# Patient Record
Sex: FEMALE | Race: WHITE | Employment: FULL TIME
[De-identification: names, ages, dates, MRNs, and addresses within clinical notes are randomized per-mention and may not be internally consistent; named-entity substitution may affect disease eponyms.]

---

## 2017-01-03 ENCOUNTER — TELEPHONE (OUTPATIENT)
Dept: FAMILY MEDICINE CLINIC | Facility: CLINIC | Age: 37
End: 2017-01-03

## 2017-01-03 DIAGNOSIS — G47.00 INSOMNIA, UNSPECIFIED TYPE: ICD-10-CM

## 2017-01-05 DIAGNOSIS — G47.00 INSOMNIA, UNSPECIFIED TYPE: ICD-10-CM

## 2017-01-09 DIAGNOSIS — G56.01 CARPAL TUNNEL SYNDROME OF RIGHT WRIST: ICD-10-CM

## 2017-01-09 RX ORDER — IBUPROFEN 600 MG/1
600 TABLET ORAL EVERY 8 HOURS PRN
Qty: 30 TABLET | Refills: 0 | Status: SHIPPED | OUTPATIENT
Start: 2017-01-09 | End: 2017-01-12 | Stop reason: SDUPTHER

## 2017-01-12 ENCOUNTER — OFFICE VISIT (OUTPATIENT)
Dept: FAMILY MEDICINE CLINIC | Facility: CLINIC | Age: 37
End: 2017-01-12

## 2017-01-12 VITALS
DIASTOLIC BLOOD PRESSURE: 70 MMHG | BODY MASS INDEX: 33.12 KG/M2 | HEIGHT: 64 IN | SYSTOLIC BLOOD PRESSURE: 126 MMHG | HEART RATE: 74 BPM | WEIGHT: 194 LBS | OXYGEN SATURATION: 98 % | TEMPERATURE: 96.6 F

## 2017-01-12 DIAGNOSIS — F98.8 ADD (ATTENTION DEFICIT DISORDER): ICD-10-CM

## 2017-01-12 DIAGNOSIS — Z13.220 SCREENING, LIPID: ICD-10-CM

## 2017-01-12 DIAGNOSIS — N39.41 URGE INCONTINENCE: ICD-10-CM

## 2017-01-12 DIAGNOSIS — M54.50 CHRONIC MIDLINE LOW BACK PAIN WITHOUT SCIATICA: Primary | ICD-10-CM

## 2017-01-12 DIAGNOSIS — G56.01 CARPAL TUNNEL SYNDROME OF RIGHT WRIST: ICD-10-CM

## 2017-01-12 DIAGNOSIS — G89.29 CHRONIC MIDLINE LOW BACK PAIN WITHOUT SCIATICA: Primary | ICD-10-CM

## 2017-01-12 PROCEDURE — 1036F TOBACCO NON-USER: CPT | Performed by: PHYSICIAN ASSISTANT

## 2017-01-12 PROCEDURE — 99214 OFFICE O/P EST MOD 30 MIN: CPT | Performed by: PHYSICIAN ASSISTANT

## 2017-01-12 PROCEDURE — G8484 FLU IMMUNIZE NO ADMIN: HCPCS | Performed by: PHYSICIAN ASSISTANT

## 2017-01-12 PROCEDURE — G8427 DOCREV CUR MEDS BY ELIG CLIN: HCPCS | Performed by: PHYSICIAN ASSISTANT

## 2017-01-12 PROCEDURE — G8419 CALC BMI OUT NRM PARAM NOF/U: HCPCS | Performed by: PHYSICIAN ASSISTANT

## 2017-01-12 RX ORDER — IBUPROFEN 600 MG/1
600 TABLET ORAL EVERY 8 HOURS PRN
Qty: 90 TABLET | Refills: 0 | Status: SHIPPED | OUTPATIENT
Start: 2017-01-12 | End: 2017-04-29 | Stop reason: SDUPTHER

## 2017-01-12 RX ORDER — TROSPIUM CHLORIDE 20 MG/1
20 TABLET, FILM COATED ORAL 2 TIMES DAILY
Qty: 180 TABLET | Refills: 3 | Status: SHIPPED | OUTPATIENT
Start: 2017-01-12 | End: 2018-01-06 | Stop reason: SDUPTHER

## 2017-01-12 RX ORDER — LISDEXAMFETAMINE DIMESYLATE 50 MG
50 CAPSULE ORAL DAILY
Qty: 30 CAPSULE | Refills: 0 | Status: SHIPPED | OUTPATIENT
Start: 2017-01-12 | End: 2017-07-12

## 2017-01-12 ASSESSMENT — ENCOUNTER SYMPTOMS
COLOR CHANGE: 0
ABDOMINAL PAIN: 0
CONSTIPATION: 0
VOMITING: 0
NAUSEA: 0
COUGH: 0
WHEEZING: 0
DIARRHEA: 0
BOWEL INCONTINENCE: 0
SHORTNESS OF BREATH: 0
BACK PAIN: 1

## 2017-01-18 ENCOUNTER — TELEPHONE (OUTPATIENT)
Dept: FAMILY MEDICINE CLINIC | Facility: CLINIC | Age: 37
End: 2017-01-18

## 2017-01-18 ENCOUNTER — PATIENT MESSAGE (OUTPATIENT)
Dept: FAMILY MEDICINE CLINIC | Facility: CLINIC | Age: 37
End: 2017-01-18

## 2017-02-14 DIAGNOSIS — G47.00 INSOMNIA, UNSPECIFIED TYPE: ICD-10-CM

## 2017-03-18 ENCOUNTER — PATIENT MESSAGE (OUTPATIENT)
Dept: FAMILY MEDICINE CLINIC | Age: 37
End: 2017-03-18

## 2017-03-26 ENCOUNTER — PATIENT MESSAGE (OUTPATIENT)
Dept: FAMILY MEDICINE CLINIC | Age: 37
End: 2017-03-26

## 2017-03-27 RX ORDER — ZOLPIDEM TARTRATE 6.25 MG/1
6.25 TABLET, FILM COATED, EXTENDED RELEASE ORAL NIGHTLY PRN
Qty: 30 TABLET | Refills: 2 | Status: SHIPPED | OUTPATIENT
Start: 2017-03-27 | End: 2017-04-10

## 2017-04-17 ENCOUNTER — PATIENT MESSAGE (OUTPATIENT)
Dept: FAMILY MEDICINE CLINIC | Age: 37
End: 2017-04-17

## 2017-04-17 RX ORDER — ZOLPIDEM TARTRATE 12.5 MG/1
12.5 TABLET, FILM COATED, EXTENDED RELEASE ORAL NIGHTLY PRN
Qty: 30 TABLET | Refills: 0 | OUTPATIENT
Start: 2017-04-17 | End: 2017-11-13 | Stop reason: SDUPTHER

## 2017-04-29 DIAGNOSIS — G56.01 CARPAL TUNNEL SYNDROME OF RIGHT WRIST: ICD-10-CM

## 2017-05-01 RX ORDER — IBUPROFEN 600 MG/1
TABLET ORAL
Qty: 90 TABLET | Refills: 0 | Status: SHIPPED | OUTPATIENT
Start: 2017-05-01 | End: 2017-08-23 | Stop reason: SDUPTHER

## 2017-07-12 ENCOUNTER — OFFICE VISIT (OUTPATIENT)
Dept: FAMILY MEDICINE CLINIC | Age: 37
End: 2017-07-12
Payer: COMMERCIAL

## 2017-07-12 VITALS
SYSTOLIC BLOOD PRESSURE: 122 MMHG | HEART RATE: 78 BPM | HEIGHT: 65 IN | DIASTOLIC BLOOD PRESSURE: 70 MMHG | BODY MASS INDEX: 30.82 KG/M2 | OXYGEN SATURATION: 98 % | WEIGHT: 185 LBS

## 2017-07-12 DIAGNOSIS — F98.8 ADD (ATTENTION DEFICIT DISORDER): Primary | ICD-10-CM

## 2017-07-12 DIAGNOSIS — N32.81 OAB (OVERACTIVE BLADDER): ICD-10-CM

## 2017-07-12 PROCEDURE — 1036F TOBACCO NON-USER: CPT | Performed by: PHYSICIAN ASSISTANT

## 2017-07-12 PROCEDURE — 99213 OFFICE O/P EST LOW 20 MIN: CPT | Performed by: PHYSICIAN ASSISTANT

## 2017-07-12 PROCEDURE — G8427 DOCREV CUR MEDS BY ELIG CLIN: HCPCS | Performed by: PHYSICIAN ASSISTANT

## 2017-07-12 PROCEDURE — G8419 CALC BMI OUT NRM PARAM NOF/U: HCPCS | Performed by: PHYSICIAN ASSISTANT

## 2017-07-12 ASSESSMENT — ENCOUNTER SYMPTOMS
WHEEZING: 0
VOMITING: 0
NAUSEA: 0
ABDOMINAL PAIN: 0
SHORTNESS OF BREATH: 0
COLOR CHANGE: 0
COUGH: 0
CONSTIPATION: 0
DIARRHEA: 0

## 2017-08-23 DIAGNOSIS — G56.01 CARPAL TUNNEL SYNDROME OF RIGHT WRIST: ICD-10-CM

## 2017-08-24 DIAGNOSIS — F98.8 ADD (ATTENTION DEFICIT DISORDER): ICD-10-CM

## 2017-08-24 RX ORDER — IBUPROFEN 600 MG/1
TABLET ORAL
Qty: 90 TABLET | Refills: 0 | Status: SHIPPED | OUTPATIENT
Start: 2017-08-24 | End: 2017-10-01 | Stop reason: SDUPTHER

## 2017-10-01 DIAGNOSIS — G56.01 CARPAL TUNNEL SYNDROME OF RIGHT WRIST: ICD-10-CM

## 2017-10-02 DIAGNOSIS — F98.8 ADD (ATTENTION DEFICIT DISORDER): ICD-10-CM

## 2017-10-02 RX ORDER — IBUPROFEN 600 MG/1
TABLET ORAL
Qty: 90 TABLET | Refills: 0 | Status: SHIPPED | OUTPATIENT
Start: 2017-10-02 | End: 2017-10-21 | Stop reason: SDUPTHER

## 2017-10-11 ENCOUNTER — OFFICE VISIT (OUTPATIENT)
Dept: FAMILY MEDICINE CLINIC | Age: 37
End: 2017-10-11
Payer: COMMERCIAL

## 2017-10-11 ENCOUNTER — HOSPITAL ENCOUNTER (OUTPATIENT)
Age: 37
Setting detail: SPECIMEN
Discharge: HOME | End: 2017-10-11
Payer: COMMERCIAL

## 2017-10-11 VITALS
RESPIRATION RATE: 18 BRPM | SYSTOLIC BLOOD PRESSURE: 128 MMHG | DIASTOLIC BLOOD PRESSURE: 84 MMHG | BODY MASS INDEX: 29.79 KG/M2 | WEIGHT: 189.8 LBS | HEART RATE: 80 BPM | HEIGHT: 67 IN | TEMPERATURE: 97.9 F

## 2017-10-11 DIAGNOSIS — Z12.4 PAP SMEAR FOR CERVICAL CANCER SCREENING: Primary | ICD-10-CM

## 2017-10-11 DIAGNOSIS — F98.8 ADD (ATTENTION DEFICIT DISORDER): ICD-10-CM

## 2017-10-11 PROCEDURE — 99395 PREV VISIT EST AGE 18-39: CPT | Performed by: PHYSICIAN ASSISTANT

## 2017-10-11 NOTE — PROGRESS NOTES
Visit Information    Have you changed or started any medications since your last visit including any over-the-counter medicines, vitamins, or herbal medicines? yes - SUPPLEMENTS   Have you stopped taking any of your medications? Is so, why? -  no  Are you having any side effects from any of your medications? - no    Have you seen any other physician or provider since your last visit?  no   Have you had any other diagnostic tests since your last visit?  no   Have you been seen in the emergency room and/or had an admission in a hospital since we last saw you?  no   Have you had your routine dental cleaning in the past 6 months?  no     Do you have an active [employer] account? If no, what is the barrier?   No: email sent    Patient Care Team:  Tamica Muñoz PA-C as PCP - General (Family Medicine)    Medical History Review  Past Medical, Family, and Social History reviewed and does contribute to the patient presenting condition    Health Maintenance   Topic Date Due    DTaP/Tdap/Td vaccine (1 - Tdap) 03/22/2000    Flu vaccine (1) 09/01/2017    Cervical cancer screen  10/28/2017    HIV screen  Completed

## 2017-10-11 NOTE — PROGRESS NOTES
[address]  [address] [place], [place]  [phone]      Regina Rivero is a 40 y.o. female who presents today for her  medical conditions/complaints as noted below. Regina Rivero is c/o of Gynecologic Exam (Last pap 10/28/14 )  . HPI:     HPI    Patient here for annual pap  Patient states menses normally regular. Was late by 10 days this last cycle. Mom was in menopause around early 45s. Also reports doing well on her vyvanse. No refill needed at this time  She reports just recently having some back pain again. Had surgery 1 year ago. No new trauma.  Hx spondylolithesis, surgery with Dr. Tarah Quintanilla in the past    Past Medical History:   Diagnosis Date    ADD (attention deficit disorder)     Anxiety     Back pain     Carpal tunnel syndrome of right wrist 5/19/2016    Foot pain     left    GERD (gastroesophageal reflux disease)     Lumbago     OAB (overactive bladder) 7/12/2017    Sciatica     Urge incontinence       Past Surgical History:   Procedure Laterality Date    BACK SURGERY  09/19/2016    Lumbar interbody fusion posterior, L5-S1    DILATION AND CURETTAGE OF UTERUS      TONSILLECTOMY      WISDOM TOOTH EXTRACTION       Family History   Problem Relation Age of Onset    High Cholesterol Mother      Social History   Substance Use Topics    Smoking status: Former Smoker     Packs/day: 1.00     Years: 15.00     Quit date: 7/13/2016    Smokeless tobacco: Never Used    Alcohol use Yes     1 Glasses of wine per week      Comment: Maybe a couple drinks a month      Current Outpatient Prescriptions   Medication Sig Dispense Refill    Sennosides-Docusate Sodium (STOOL SOFTENER LAXATIVE PO) Take 1 tablet by mouth 2 times daily      Nutritional Supplements (DHEA PO) Take 1 tablet by mouth daily      lisdexamfetamine (VYVANSE) 50 MG capsule Take 1 capsule by mouth every morning  Fill 2/9/17. 30 capsule 0    ibuprofen (ADVIL;MOTRIN) 600 MG tablet TAKE 1 TABLET EVERY 8 HOURSAS NEEDED FOR PAIN 90 tablet 0    trospium (SANCTURA) 20 MG tablet Take 1 tablet by mouth 2 times daily 180 tablet 3    Lactobacillus (PROBIOTIC ACIDOPHILUS PO) Take 1 tablet by mouth daily       No current facility-administered medications for this visit. No Known Allergies    Health Maintenance   Topic Date Due    DTaP/Tdap/Td vaccine (1 - Tdap) 03/22/2000    Cervical cancer screen  10/28/2017    Flu vaccine (1) 10/01/2018 (Originally 9/1/2017)    HIV screen  Completed       Subjective:      normal menses, no abnormal bleeding, pelvic pain or discharge, no breast pain or new or enlarging lumps on self exam, no vaginal bleeding, no discharge or pelvic pain. No new partners. No present concerns. Objective:   /84 (Site: Left Arm, Position: Sitting, Cuff Size: Large Adult)   Pulse 80   Temp 97.9 °F (36.6 °C) (Oral)   Resp 18   Ht 5' 7\" (1.702 m)   Wt 189 lb 12.8 oz (86.1 kg)   LMP 09/24/2017   BMI 29.73 kg/m²     General Appearance: alert and oriented to person, place and time, well developed and well- nourished, in no acute distress  Skin: warm and dry, no rash or erythema  Head: normocephalic and atraumatic  Neck: supple and non-tender without mass, no thyromegaly or thyroid nodules, no cervical lymphadenopathy  Pulmonary/Chest: clear to auscultation bilaterally- no wheezes, rales or rhonchi, normal air movement, no respiratory distress  Cardiovascular: normal rate, regular rhythm, normal S1 and S2, no murmurs, rubs, clicks, or gallops  Abdomen: soft, non-tender, non-distended, normal bowel sounds, no masses or organomegaly  Pelvic: normal external genitalia, vulva, vagina, cervix, uterus and adnexa. No CMT. No noted discharge or lesions. No masses noted. Breast: appear normal, no suspicious masses, no skin or nipple changes or axillary nodes bilaterally. Psych: pleasant, cooperative          Assessment:    annual pap exam  1.  Pap smear for cervical cancer screening  PAP Smear   2. ADD (attention deficit disorder)         Plan:      Return in about 3 months (around 1/11/2018) for add. Office will call pt with pap results in the next 10 days. If no call received pt agreed to call in the next 2 weeks for results. Patient going to call Dr. Indira Kaplan for apt for her back  ADD stable on vyvanse  Patient agreed with treatment plan  Health Maintenance reviewed - declined flu shot. Orders Placed This Encounter   Procedures    PAP Smear     Patient History:    Patient's last menstrual period was 09/24/2017. OBGYN Status: Having periods  Past Surgical History:  09/19/2016: BACK SURGERY      Comment: Lumbar interbody fusion posterior, L5-S1  No date: DILATION AND CURETTAGE OF UTERUS  No date: TONSILLECTOMY  No date: WISDOM TOOTH EXTRACTION      Smoking status: Former Smoker                                                              Packs/day: 1.00      Years: 15.00        Quit date: 7/13/2016  Smokeless tobacco: Never Used                           Order Specific Question:   Collection Type     Answer: Thin Prep     Order Specific Question:   Prior Abnormal Pap Test     Answer:   No     Order Specific Question:   Screening or Diagnostic     Answer:   Screening     Order Specific Question:   HPV Requested? Answer:   Yes     Order Specific Question:   High Risk Patient     Answer:   Lack of Screening       Controlled Substances Monitoring:     Attestation: The Prescription Monitoring Report for this patient was reviewed today. (Renee Valdez PA-C)  Documentation: No signs of potential drug abuse or diversion identified. (Renee Nunez PA-C)    Patient given educational materials - see patient instructions. Discussed use, benefit, and side effects of prescribed medications. All patient questions answered. Pt voiced understanding. Reviewed health maintenance. Instructed to continue current medications, diet and exercise.   Patient agreed with treatment

## 2017-10-13 LAB
HPV SAMPLE: NORMAL
HPV SOURCE: NORMAL
HPV, GENOTYPE 16: NOT DETECTED
HPV, GENOTYPE 18: NOT DETECTED
HPV, HIGH RISK OTHER: NOT DETECTED
HPV, INTERPRETATION: NORMAL

## 2017-10-16 LAB — CYTOLOGY REPORT: NORMAL

## 2017-10-21 DIAGNOSIS — G56.01 CARPAL TUNNEL SYNDROME OF RIGHT WRIST: ICD-10-CM

## 2017-10-23 RX ORDER — IBUPROFEN 600 MG/1
TABLET ORAL
Qty: 90 TABLET | Refills: 0 | Status: SHIPPED | OUTPATIENT
Start: 2017-10-23 | End: 2017-11-13 | Stop reason: SDUPTHER

## 2017-11-13 DIAGNOSIS — F98.8 ATTENTION DEFICIT DISORDER, UNSPECIFIED HYPERACTIVITY PRESENCE: ICD-10-CM

## 2017-11-13 DIAGNOSIS — G56.01 CARPAL TUNNEL SYNDROME OF RIGHT WRIST: ICD-10-CM

## 2017-11-13 RX ORDER — IBUPROFEN 600 MG/1
TABLET ORAL
Qty: 90 TABLET | Refills: 0 | Status: SHIPPED | OUTPATIENT
Start: 2017-11-13 | End: 2018-07-02 | Stop reason: ALTCHOICE

## 2017-11-14 RX ORDER — ZOLPIDEM TARTRATE 12.5 MG/1
12.5 TABLET, FILM COATED, EXTENDED RELEASE ORAL NIGHTLY PRN
Qty: 30 TABLET | Refills: 0 | Status: SHIPPED | OUTPATIENT
Start: 2017-11-14 | End: 2017-12-14

## 2017-12-15 ENCOUNTER — HOSPITAL ENCOUNTER (OUTPATIENT)
Dept: MRI IMAGING | Age: 37
Discharge: HOME | End: 2017-12-15
Payer: COMMERCIAL

## 2017-12-15 DIAGNOSIS — M51.26 LUMBAR HERNIATED DISC: ICD-10-CM

## 2017-12-15 PROCEDURE — 6360000004 HC RX CONTRAST MEDICATION: Performed by: NEUROLOGICAL SURGERY

## 2017-12-15 PROCEDURE — 72158 MRI LUMBAR SPINE W/O & W/DYE: CPT

## 2017-12-15 PROCEDURE — A9579 GAD-BASE MR CONTRAST NOS,1ML: HCPCS | Performed by: NEUROLOGICAL SURGERY

## 2017-12-15 RX ADMIN — GADOTERIDOL 18 ML: 279.3 INJECTION, SOLUTION INTRAVENOUS at 16:51

## 2017-12-21 DIAGNOSIS — F98.8 ATTENTION DEFICIT DISORDER, UNSPECIFIED HYPERACTIVITY PRESENCE: ICD-10-CM

## 2018-01-06 DIAGNOSIS — N39.41 URGE INCONTINENCE: ICD-10-CM

## 2018-01-08 ENCOUNTER — OFFICE VISIT (OUTPATIENT)
Dept: FAMILY MEDICINE CLINIC | Age: 38
End: 2018-01-08
Payer: COMMERCIAL

## 2018-01-08 VITALS
DIASTOLIC BLOOD PRESSURE: 72 MMHG | OXYGEN SATURATION: 98 % | HEART RATE: 73 BPM | WEIGHT: 191 LBS | BODY MASS INDEX: 29.98 KG/M2 | TEMPERATURE: 96.8 F | SYSTOLIC BLOOD PRESSURE: 110 MMHG | HEIGHT: 67 IN

## 2018-01-08 DIAGNOSIS — F98.8 ATTENTION DEFICIT DISORDER, UNSPECIFIED HYPERACTIVITY PRESENCE: Primary | ICD-10-CM

## 2018-01-08 DIAGNOSIS — G47.20 SLEEP-WAKE CYCLE DISORDER: ICD-10-CM

## 2018-01-08 PROCEDURE — G8484 FLU IMMUNIZE NO ADMIN: HCPCS | Performed by: PHYSICIAN ASSISTANT

## 2018-01-08 PROCEDURE — G8427 DOCREV CUR MEDS BY ELIG CLIN: HCPCS | Performed by: PHYSICIAN ASSISTANT

## 2018-01-08 PROCEDURE — G8417 CALC BMI ABV UP PARAM F/U: HCPCS | Performed by: PHYSICIAN ASSISTANT

## 2018-01-08 PROCEDURE — 1036F TOBACCO NON-USER: CPT | Performed by: PHYSICIAN ASSISTANT

## 2018-01-08 PROCEDURE — 99213 OFFICE O/P EST LOW 20 MIN: CPT | Performed by: PHYSICIAN ASSISTANT

## 2018-01-08 RX ORDER — ZOLPIDEM TARTRATE 12.5 MG/1
TABLET, FILM COATED, EXTENDED RELEASE ORAL
Refills: 0 | COMMUNITY
Start: 2017-12-29 | End: 2019-07-11 | Stop reason: SDUPTHER

## 2018-01-08 RX ORDER — METHYLPREDNISOLONE 4 MG/1
TABLET ORAL
Refills: 0 | COMMUNITY
Start: 2018-01-02 | End: 2018-01-26 | Stop reason: ALTCHOICE

## 2018-01-08 RX ORDER — TROSPIUM CHLORIDE 20 MG/1
TABLET, FILM COATED ORAL
Qty: 180 TABLET | Refills: 3 | Status: SHIPPED | OUTPATIENT
Start: 2018-01-08 | End: 2018-12-23 | Stop reason: SDUPTHER

## 2018-01-08 ASSESSMENT — PATIENT HEALTH QUESTIONNAIRE - PHQ9
SUM OF ALL RESPONSES TO PHQ9 QUESTIONS 1 & 2: 1
SUM OF ALL RESPONSES TO PHQ QUESTIONS 1-9: 1
1. LITTLE INTEREST OR PLEASURE IN DOING THINGS: 1
2. FEELING DOWN, DEPRESSED OR HOPELESS: 0

## 2018-01-08 ASSESSMENT — ENCOUNTER SYMPTOMS
ABDOMINAL PAIN: 0
COUGH: 0
WHEEZING: 0
CONSTIPATION: 0
DIARRHEA: 0
NAUSEA: 0
COLOR CHANGE: 0
VOMITING: 0
SHORTNESS OF BREATH: 0

## 2018-01-08 NOTE — PROGRESS NOTES
Visit Information    Have you changed or started any medications since your last visit including any over-the-counter medicines, vitamins, or herbal medicines? no   Have you stopped taking any of your medications? Is so, why? -  no  Are you having any side effects from any of your medications? - no    Have you seen any other physician or provider since your last visit? yes - Neuro surgery   Have you had any other diagnostic tests since your last visit? yes - MRI   Have you been seen in the emergency room and/or had an admission in a hospital since we last saw you?  no   Have you had your routine dental cleaning in the past 6 months? Yes     Do you have an active MyChart account? If no, what is the barrier?   Yes    Patient Care Team:  Juliet Valentin PA-C as PCP - General (Family Medicine)    Medical History Review  Past Medical, Family, and Social History reviewed and does contribute to the patient presenting condition    Health Maintenance   Topic Date Due    DTaP/Tdap/Td vaccine (1 - Tdap) 03/22/2000    Flu vaccine (1) 10/01/2018 (Originally 9/1/2017)    Cervical cancer screen  10/11/2022    HIV screen  Completed

## 2018-01-08 NOTE — PROGRESS NOTES
Hematological: Negative for adenopathy. Psychiatric/Behavioral: Negative for sleep disturbance. The patient is not nervous/anxious. Objective:     Physical Exam   Constitutional: She is oriented to person, place, and time. She appears well-developed and well-nourished. HENT:   Head: Normocephalic and atraumatic. Cardiovascular: Normal rate, regular rhythm and normal heart sounds. No murmur heard. Pulmonary/Chest: Effort normal and breath sounds normal. No respiratory distress. She has no wheezes. She has no rales. Neurological: She is alert and oriented to person, place, and time. Skin: Skin is warm and dry. No rash noted. No erythema. No pallor. Psychiatric: She has a normal mood and affect. Her behavior is normal. Judgment and thought content normal.   Nursing note and vitals reviewed. /72 (Site: Left Arm, Position: Sitting, Cuff Size: Medium Adult)   Pulse 73   Temp 96.8 °F (36 °C) (Tympanic)   Ht 5' 7\" (1.702 m)   Wt 191 lb (86.6 kg)   SpO2 98%   BMI 29.91 kg/m²     Assessment:      1. Attention deficit disorder, unspecified hyperactivity presence  Drugs of Abuse Scr, Urine   2. Sleep-wake cycle disorder               Plan:      Return in about 3 months (around 4/8/2018) for add. Patient and I discussed stopping vyvanse and trying provigil /nuvigil in place of this  She is going to call her insurance to see what is covered  Update drug screen  Patient agreed with treatment plan  Health Maintenance reviewed. Controlled Substances Monitoring:     Attestation: The Prescription Monitoring Report for this patient was reviewed today. (Renee Valdez PA-C)  Documentation: No signs of potential drug abuse or diversion identified. (Renee Lee PA-C)     Patient given educational materials - see patient instructions. Discussed use, benefit, and side effects of prescribed medications. All patient questions answered. Pt voiced understanding.  Reviewed health maintenance. Instructed to continue current medications, diet and exercise. Patient agreed with treatment plan. Follow up as directed.      Electronically signed by Mallory Butts PA-C on 1/8/2018 at 2:58 PM

## 2018-01-18 DIAGNOSIS — F98.8 ATTENTION DEFICIT DISORDER, UNSPECIFIED HYPERACTIVITY PRESENCE: ICD-10-CM

## 2018-01-26 ENCOUNTER — HOSPITAL ENCOUNTER (EMERGENCY)
Age: 38
Discharge: HOME | End: 2018-01-26
Attending: EMERGENCY MEDICINE
Payer: COMMERCIAL

## 2018-01-26 VITALS
OXYGEN SATURATION: 99 % | BODY MASS INDEX: 30 KG/M2 | DIASTOLIC BLOOD PRESSURE: 66 MMHG | SYSTOLIC BLOOD PRESSURE: 132 MMHG | HEIGHT: 67 IN | TEMPERATURE: 97.6 F | RESPIRATION RATE: 16 BRPM | WEIGHT: 191.13 LBS | HEART RATE: 103 BPM

## 2018-01-26 DIAGNOSIS — M54.32 SCIATICA OF LEFT SIDE: Primary | ICD-10-CM

## 2018-01-26 LAB
BILIRUBIN, POC: NEGATIVE
BLOOD URINE, POC: NEGATIVE
CHP ED QC CHECK: NORMAL
CHP ED QC CHECK: NORMAL
CLARITY, POC: CLEAR
COLOR, POC: YELLOW
GLUCOSE URINE, POC: NEGATIVE
HCG, PREGNANCY URINE (POC): NEGATIVE
KETONES, POC: NEGATIVE
LEUKOCYTE EST, POC: NEGATIVE
NITRITE, POC: NEGATIVE
PH, POC: 5.5
PREGNANCY TEST URINE, POC: NEGATIVE
PROTEIN, POC: NEGATIVE
SPECIFIC GRAVITY, POC: 1.02
UROBILINOGEN, POC: 0.2

## 2018-01-26 PROCEDURE — 81003 URINALYSIS AUTO W/O SCOPE: CPT

## 2018-01-26 PROCEDURE — 99283 EMERGENCY DEPT VISIT LOW MDM: CPT

## 2018-01-26 PROCEDURE — 84703 CHORIONIC GONADOTROPIN ASSAY: CPT

## 2018-01-26 PROCEDURE — 6360000002 HC RX W HCPCS: Performed by: EMERGENCY MEDICINE

## 2018-01-26 PROCEDURE — 96372 THER/PROPH/DIAG INJ SC/IM: CPT

## 2018-01-26 RX ORDER — KETOROLAC TROMETHAMINE 30 MG/ML
60 INJECTION, SOLUTION INTRAMUSCULAR; INTRAVENOUS ONCE
Status: COMPLETED | OUTPATIENT
Start: 2018-01-26 | End: 2018-01-26

## 2018-01-26 RX ORDER — HYDROCODONE BITARTRATE AND ACETAMINOPHEN 5; 325 MG/1; MG/1
1 TABLET ORAL EVERY 6 HOURS PRN
Qty: 20 TABLET | Refills: 0 | Status: SHIPPED | OUTPATIENT
Start: 2018-01-26 | End: 2018-01-31

## 2018-01-26 RX ORDER — METHYLPREDNISOLONE SODIUM SUCCINATE 125 MG/2ML
125 INJECTION, POWDER, LYOPHILIZED, FOR SOLUTION INTRAMUSCULAR; INTRAVENOUS ONCE
Status: COMPLETED | OUTPATIENT
Start: 2018-01-26 | End: 2018-01-26

## 2018-01-26 RX ORDER — CYCLOBENZAPRINE HCL 10 MG
10 TABLET ORAL 2 TIMES DAILY
COMMUNITY
End: 2018-05-14 | Stop reason: ALTCHOICE

## 2018-01-26 RX ORDER — OXYCODONE HYDROCHLORIDE AND ACETAMINOPHEN 5; 325 MG/1; MG/1
1 TABLET ORAL EVERY 4 HOURS PRN
Status: ON HOLD | COMMUNITY
End: 2018-06-27 | Stop reason: HOSPADM

## 2018-01-26 RX ORDER — PREDNISONE 10 MG/1
TABLET ORAL
Qty: 30 TABLET | Refills: 0 | Status: SHIPPED | OUTPATIENT
Start: 2018-01-26 | End: 2018-03-26

## 2018-01-26 RX ADMIN — METHYLPREDNISOLONE SODIUM SUCCINATE 125 MG: 125 INJECTION, POWDER, FOR SOLUTION INTRAMUSCULAR; INTRAVENOUS at 02:35

## 2018-01-26 RX ADMIN — KETOROLAC TROMETHAMINE 60 MG: 30 INJECTION, SOLUTION INTRAMUSCULAR at 02:35

## 2018-01-26 ASSESSMENT — PAIN DESCRIPTION - PAIN TYPE
TYPE: ACUTE PAIN
TYPE: ACUTE PAIN

## 2018-01-26 ASSESSMENT — ENCOUNTER SYMPTOMS
FACIAL SWELLING: 0
BACK PAIN: 1
EYE DISCHARGE: 0
VOMITING: 0
SHORTNESS OF BREATH: 0
DIARRHEA: 0
CONSTIPATION: 0
COLOR CHANGE: 0
EYE REDNESS: 0
ABDOMINAL PAIN: 0
COUGH: 0

## 2018-01-26 ASSESSMENT — PAIN DESCRIPTION - DESCRIPTORS: DESCRIPTORS: RADIATING

## 2018-01-26 ASSESSMENT — PAIN DESCRIPTION - LOCATION
LOCATION: BACK
LOCATION: BACK

## 2018-01-26 ASSESSMENT — PAIN SCALES - GENERAL
PAINLEVEL_OUTOF10: 8

## 2018-01-26 NOTE — ED PROVIDER NOTES
Abdominal: Soft. She exhibits no distension. There is no tenderness. Musculoskeletal: Normal range of motion. Lymphadenopathy:     She has no cervical adenopathy. Neurological: She is alert and oriented to person, place, and time. Skin: Skin is warm and dry. No rash noted. She is not diaphoretic. No erythema. Psychiatric: She has a normal mood and affect. Her behavior is normal.   Vitals reviewed. Except as noted above the remainder of the review of systems was reviewed and negative. PHYSICAL EXAM    (up to 7 for level 4, 8 or more for level 5)     Vitals:    01/26/18 0133   BP: 132/66   Pulse: 103   Resp: 16   Temp: 97.6 °F (36.4 °C)   TempSrc: Oral   SpO2: 99%   Weight: 191 lb 2 oz (86.7 kg)   Height: 5' 7\" (1.702 m)       See physical exam above      DIAGNOSTIC RESULTS     EKG: All EKG's are interpreted by the Emergency Department Physician who either signs or Co-signs this chart in the absence of a cardiologist.    Not indicated    RADIOLOGY:   Non-plain film images such as CT, Ultrasound and MRI are read by the radiologist. Plain radiographic images are visualized and preliminarily interpreted by the emergency physician with the below findings:    Not indicated    Interpretation per the Radiologist below, if available at the time of this note:        LABS:  Labs Reviewed   POCT URINALYSIS DIPSTICK - Normal   POCT URINE PREGNANCY - Normal       All other labs were within normal range or not returned as of this dictation.     EMERGENCY DEPARTMENT COURSE and DIFFERENTIAL DIAGNOSIS/MDM:   Vitals:    Vitals:    01/26/18 0133   BP: 132/66   Pulse: 103   Resp: 16   Temp: 97.6 °F (36.4 °C)   TempSrc: Oral   SpO2: 99%   Weight: 191 lb 2 oz (86.7 kg)   Height: 5' 7\" (1.702 m)       Orders Placed This Encounter   Medications    ketorolac (TORADOL) injection 60 mg    methylPREDNISolone sodium (SOLU-MEDROL) injection 125 mg    HYDROcodone-acetaminophen (NORCO) 5-325 MG per tablet     Sig: Take 1

## 2018-01-30 RX ORDER — ARMODAFINIL 150 MG/1
150 TABLET ORAL DAILY
Qty: 30 TABLET | Refills: 0 | Status: SHIPPED | OUTPATIENT
Start: 2018-01-30 | End: 2018-02-28 | Stop reason: SDUPTHER

## 2018-02-03 ENCOUNTER — HOSPITAL ENCOUNTER (EMERGENCY)
Age: 38
Discharge: HOME | End: 2018-02-03
Attending: EMERGENCY MEDICINE
Payer: COMMERCIAL

## 2018-02-03 VITALS
RESPIRATION RATE: 18 BRPM | BODY MASS INDEX: 29.51 KG/M2 | HEART RATE: 95 BPM | WEIGHT: 188 LBS | OXYGEN SATURATION: 99 % | HEIGHT: 67 IN | TEMPERATURE: 97.8 F | DIASTOLIC BLOOD PRESSURE: 74 MMHG | SYSTOLIC BLOOD PRESSURE: 133 MMHG

## 2018-02-03 DIAGNOSIS — G89.29 CHRONIC MIDLINE LOW BACK PAIN WITH LEFT-SIDED SCIATICA: Primary | ICD-10-CM

## 2018-02-03 DIAGNOSIS — M54.42 CHRONIC MIDLINE LOW BACK PAIN WITH LEFT-SIDED SCIATICA: Primary | ICD-10-CM

## 2018-02-03 LAB
CHP ED QC CHECK: YES
PREGNANCY TEST URINE, POC: NEGATIVE

## 2018-02-03 PROCEDURE — 84703 CHORIONIC GONADOTROPIN ASSAY: CPT

## 2018-02-03 PROCEDURE — 81003 URINALYSIS AUTO W/O SCOPE: CPT

## 2018-02-03 PROCEDURE — 99283 EMERGENCY DEPT VISIT LOW MDM: CPT

## 2018-02-03 PROCEDURE — 6360000002 HC RX W HCPCS: Performed by: NURSE PRACTITIONER

## 2018-02-03 PROCEDURE — 96372 THER/PROPH/DIAG INJ SC/IM: CPT

## 2018-02-03 RX ORDER — ONDANSETRON 4 MG/1
4 TABLET, ORALLY DISINTEGRATING ORAL ONCE
Status: COMPLETED | OUTPATIENT
Start: 2018-02-03 | End: 2018-02-03

## 2018-02-03 RX ORDER — DIAZEPAM 5 MG/ML
10 INJECTION, SOLUTION INTRAMUSCULAR; INTRAVENOUS ONCE
Status: COMPLETED | OUTPATIENT
Start: 2018-02-03 | End: 2018-02-03

## 2018-02-03 RX ORDER — MORPHINE SULFATE 2 MG/ML
4 INJECTION, SOLUTION INTRAMUSCULAR; INTRAVENOUS ONCE
Status: COMPLETED | OUTPATIENT
Start: 2018-02-03 | End: 2018-02-03

## 2018-02-03 RX ADMIN — Medication 10 MG: at 11:37

## 2018-02-03 RX ADMIN — ONDANSETRON 4 MG: 4 TABLET, ORALLY DISINTEGRATING ORAL at 11:25

## 2018-02-03 RX ADMIN — MORPHINE SULFATE 4 MG: 2 INJECTION, SOLUTION INTRAMUSCULAR; INTRAVENOUS at 11:27

## 2018-02-03 ASSESSMENT — ENCOUNTER SYMPTOMS
SHORTNESS OF BREATH: 0
NAUSEA: 0
DIARRHEA: 0
COLOR CHANGE: 0
BACK PAIN: 1
ABDOMINAL PAIN: 0
VOMITING: 0
COUGH: 0

## 2018-02-03 ASSESSMENT — PAIN DESCRIPTION - LOCATION: LOCATION: BACK

## 2018-02-03 ASSESSMENT — PAIN SCALES - GENERAL
PAINLEVEL_OUTOF10: 10
PAINLEVEL_OUTOF10: 10

## 2018-02-03 ASSESSMENT — PAIN DESCRIPTION - DESCRIPTORS: DESCRIPTORS: ACHING

## 2018-02-03 NOTE — ED PROVIDER NOTES
Team [address] ED  eMERGENCY dEPARTMENT eNCOUnter      Pt Name: Kym Denson  MRN: [id number]  [place] 1980  Date of evaluation: 2/3/2018  Provider: Linden Cochran NP    CHIEF COMPLAINT       Chief Complaint   Patient presents with    Back Pain         HISTORY OF PRESENT ILLNESS  (Location/Symptom, Timing/Onset, Context/Setting, Quality, Duration, Modifying Factors, Severity.)   Kym Denson is a 40 y.o. female who presents to the emergency department via private auto for low back pain that radiates down her left leg. Onset was several days ago. States she saw pain management and received her first injection yesterday. States the pain increased this morning. Denies fever, chills, change in bowel and/or bladd control, urinary symptoms. Rates her pain 10/10 at this time. States she took oral steroids, flexeril, and percocet today. A ride is present. She had an MRI of the lumbar spine 12/15/17 which was reviewed. States she was also in pain management years ago for low back pain. Nursing Notes were reviewed. ALLERGIES     Review of patient's allergies indicates no known allergies. CURRENT MEDICATIONS       Previous Medications    ARMODAFINIL (NUVIGIL) 150 MG TABS TABLET    Take 1 tablet by mouth daily for 30 days. CYCLOBENZAPRINE (FLEXERIL) 10 MG TABLET    Take 10 mg by mouth 3 times daily as needed for Muscle spasms    IBUPROFEN (ADVIL;MOTRIN) 600 MG TABLET    TAKE 1 TABLET EVERY 8 HOURSAS NEEDED FOR PAIN    LACTOBACILLUS (PROBIOTIC ACIDOPHILUS PO)    Take 1 tablet by mouth daily    OXYCODONE-ACETAMINOPHEN (PERCOCET) 5-325 MG PER TABLET    Take 1 tablet by mouth every 4 hours as needed for Pain.     PREDNISONE (DELTASONE) 10 MG TABLET    Take 4 by mouth daily for 3 days then  3 by mouth daily for 3 days then  2 by mouth daily for 3 days then  1 by mouth daily for 3 days    SENNOSIDES-DOCUSATE SODIUM (STOOL SOFTENER LAXATIVE PO)    Take 1 tablet by mouth 2 times daily    TROSPIUM level 5)     ED Triage Vitals [02/03/18 1053]   BP Temp Temp src Pulse Resp SpO2 Height Weight   133/74 97.8 °F (36.6 °C) -- 95 18 99 % 5' 7\" (1.702 m) 188 lb (85.3 kg)     Physical Exam   Constitutional: She is oriented to person, place, and time. She appears well-developed and well-nourished. No distress. Eyes: Conjunctivae are normal.   Cardiovascular: Normal rate, regular rhythm and normal heart sounds. Pulmonary/Chest: Effort normal and breath sounds normal. No respiratory distress. She has no wheezes. She has no rales. Musculoskeletal:        Thoracic back: She exhibits no tenderness, no bony tenderness and no pain. Lumbar back: She exhibits tenderness, bony tenderness and pain. She exhibits no swelling and no deformity. Neurological: She is alert and oriented to person, place, and time. Coordination and gait normal.   Skin: Skin is warm and dry. No rash noted. She is not diaphoretic. Psychiatric: She has a normal mood and affect. Her behavior is normal.   Vitals reviewed. DIAGNOSTIC RESULTS     RADIOLOGY:   Non-plain film images such as CT, Ultrasound and MRI are read by the radiologist. Plain radiographic images are visualized and preliminarily interpreted by the emergency physician with the below findings:    Interpretation per the Radiologist below, if available at the time of this note:    Not indicated. EMERGENCY DEPARTMENT COURSE and DIFFERENTIAL DIAGNOSIS/MDM:   Vitals:    Vitals:    02/03/18 1053   BP: 133/74   Pulse: 95   Resp: 18   Temp: 97.8 °F (36.6 °C)   SpO2: 99%   Weight: 188 lb (85.3 kg)   Height: 5' 7\" (1.702 m)         MEDICATIONS GIVEN IN THE ED:  Medications   diazepam (VALIUM) injection 10 mg (not administered)   morphine injection 4 mg (not administered)   ondansetron (ZOFRAN-ODT) disintegrating tablet 4 mg (not administered)       CLINICAL DECISION MAKING:  The patient presented alert with a nontoxic appearance and was seen in conjunction with Dr. Kim Abarca.  She

## 2018-02-03 NOTE — ED TRIAGE NOTES
Pt to ED c/o chronic back pain. Pt AOx4. States she started pain management yesterday and her pain is worse today. Denies follow up with pain management Dr. Kori Albright shooting pain to R leg.

## 2018-02-05 LAB — HCG, PREGNANCY URINE (POC): NEGATIVE

## 2018-03-01 RX ORDER — ARMODAFINIL 150 MG/1
TABLET ORAL
Qty: 30 TABLET | Refills: 0 | Status: SHIPPED | OUTPATIENT
Start: 2018-03-01 | End: 2018-03-08 | Stop reason: ALTCHOICE

## 2018-03-01 RX ORDER — ARMODAFINIL 150 MG/1
TABLET ORAL
Qty: 30 TABLET | Refills: 0 | OUTPATIENT
Start: 2018-03-01 | End: 2018-04-01

## 2018-03-05 ENCOUNTER — PATIENT MESSAGE (OUTPATIENT)
Dept: FAMILY MEDICINE CLINIC | Age: 38
End: 2018-03-05

## 2018-03-05 NOTE — TELEPHONE ENCOUNTER
From: Tilton Apgar  To: Allana Cushing, PA-C  Sent: 3/5/2018 4:01 PM EST  Subject: Prescription Question    I called in a refill for my armodofinal, but was wondering if the dosage could be increased because I'm not really noticing a difference with taking it. Thank you.

## 2018-03-06 ENCOUNTER — PATIENT MESSAGE (OUTPATIENT)
Dept: FAMILY MEDICINE CLINIC | Age: 38
End: 2018-03-06

## 2018-03-08 RX ORDER — MODAFINIL 200 MG/1
TABLET ORAL
Qty: 30 TABLET | Refills: 3 | Status: SHIPPED | OUTPATIENT
Start: 2018-03-08 | End: 2018-04-08

## 2018-03-26 ENCOUNTER — HOSPITAL ENCOUNTER (OUTPATIENT)
Dept: CT IMAGING | Age: 38
Discharge: HOME | End: 2018-03-28
Payer: COMMERCIAL

## 2018-03-26 ENCOUNTER — HOSPITAL ENCOUNTER (OUTPATIENT)
Dept: INTERVENTIONAL RADIOLOGY/VASCULAR | Age: 38
Discharge: HOME | End: 2018-03-28
Payer: COMMERCIAL

## 2018-03-26 VITALS
TEMPERATURE: 97.5 F | SYSTOLIC BLOOD PRESSURE: 121 MMHG | RESPIRATION RATE: 20 BRPM | OXYGEN SATURATION: 100 % | HEART RATE: 84 BPM | DIASTOLIC BLOOD PRESSURE: 71 MMHG

## 2018-03-26 VITALS
BODY MASS INDEX: 31.83 KG/M2 | OXYGEN SATURATION: 96 % | RESPIRATION RATE: 15 BRPM | SYSTOLIC BLOOD PRESSURE: 116 MMHG | HEART RATE: 79 BPM | HEIGHT: 67 IN | DIASTOLIC BLOOD PRESSURE: 57 MMHG | TEMPERATURE: 97.7 F | WEIGHT: 202.82 LBS

## 2018-03-26 DIAGNOSIS — M54.17 LUMBOSACRAL NEURITIS: ICD-10-CM

## 2018-03-26 DIAGNOSIS — M54.17 LUMBOSACRAL RADICULOPATHY: ICD-10-CM

## 2018-03-26 LAB
INR BLD: 0.9
PARTIAL THROMBOPLASTIN TIME: 24.2 SEC (ref 23–31)
PLATELET # BLD: 246 K/UL (ref 130–400)
PROTHROMBIN TIME: 9.5 SEC (ref 9.7–11.6)

## 2018-03-26 PROCEDURE — 85610 PROTHROMBIN TIME: CPT

## 2018-03-26 PROCEDURE — 85049 AUTOMATED PLATELET COUNT: CPT

## 2018-03-26 PROCEDURE — 7100000010 HC PHASE II RECOVERY - FIRST 15 MIN

## 2018-03-26 PROCEDURE — 85730 THROMBOPLASTIN TIME PARTIAL: CPT

## 2018-03-26 PROCEDURE — 6360000004 HC RX CONTRAST MEDICATION: Performed by: RADIOLOGY

## 2018-03-26 PROCEDURE — 62304 MYELOGRAPHY LUMBAR INJECTION: CPT | Performed by: RADIOLOGY

## 2018-03-26 PROCEDURE — 2500000003 HC RX 250 WO HCPCS: Performed by: RADIOLOGY

## 2018-03-26 PROCEDURE — 72132 CT LUMBAR SPINE W/DYE: CPT

## 2018-03-26 PROCEDURE — 36415 COLL VENOUS BLD VENIPUNCTURE: CPT

## 2018-03-26 PROCEDURE — 7100000011 HC PHASE II RECOVERY - ADDTL 15 MIN

## 2018-03-26 RX ORDER — GABAPENTIN 600 MG/1
300 TABLET ORAL DAILY
COMMUNITY
End: 2019-01-14 | Stop reason: ALTCHOICE

## 2018-03-26 RX ORDER — LIDOCAINE HYDROCHLORIDE 10 MG/ML
INJECTION, SOLUTION EPIDURAL; INFILTRATION; INTRACAUDAL; PERINEURAL
Status: COMPLETED | OUTPATIENT
Start: 2018-03-26 | End: 2018-03-26

## 2018-03-26 RX ORDER — ACETAMINOPHEN 325 MG/1
650 TABLET ORAL EVERY 4 HOURS PRN
Status: DISCONTINUED | OUTPATIENT
Start: 2018-03-26 | End: 2018-03-29 | Stop reason: HOSPADM

## 2018-03-26 RX ADMIN — LIDOCAINE HYDROCHLORIDE 4 ML: 10 INJECTION, SOLUTION EPIDURAL; INFILTRATION; INTRACAUDAL; PERINEURAL at 09:02

## 2018-03-26 RX ADMIN — IOPAMIDOL 15 ML: 408 INJECTION, SOLUTION INTRATHECAL at 09:02

## 2018-03-26 ASSESSMENT — PAIN - FUNCTIONAL ASSESSMENT: PAIN_FUNCTIONAL_ASSESSMENT: 0-10

## 2018-03-26 ASSESSMENT — PAIN DESCRIPTION - DESCRIPTORS: DESCRIPTORS: NUMBNESS;SHOOTING

## 2018-03-26 NOTE — H&P
Historical Provider, MD   modafinil (PROVIGIL) 200 MG tablet Take 1 hour prior to start of shift. 3/8/18 4/8/18  Renee Valdez PA-C   oxyCODONE-acetaminophen (PERCOCET) 5-325 MG per tablet Take 1 tablet by mouth every 4 hours as needed for Pain. Historical Provider, MD   zolpidem (AMBIEN CR) 12.5 MG extended release tablet TAKE 1 TABLET BY MOUTH NIGHTLY AS NEEDED FOR SLEEP 12/29/17   Historical Provider, MD   Sennosides-Docusate Sodium (STOOL SOFTENER LAXATIVE PO) Take 1 tablet by mouth 2 times daily    Historical Provider, MD     Allergies  has No Known Allergies. Family History  family history includes High Cholesterol in her mother. Social History   reports that she quit smoking about 20 months ago. She has a 15.00 pack-year smoking history. She has never used smokeless tobacco.   reports that she drinks alcohol. reports that she does not use drugs. ROS:  General Health:  Denies any problems with fatigue, weight, appetite, or sleep. Skin:  No itching or rashes. No lesions. No easy bruising or bleeding. HEENT:  Denies cephalgia or head injury. No eye or ear pain. No sinusitis, rhinorhea or epistaxis. No frequent sorethroat, dysphagia or hoarseness. No masses, pain, stiffness or injury to the neck. Cardio-Respiratory: No wheezing, shortness of breath, hemoptysis, chest pain, dizziness, orthopnea or palpitations. Gastrointestinal:  No nausea, vomiting or acid reflux  No constipation, diarrhea, hair stools, red or black in the stool. Genitourinary:  No dysuria, hematuria, discharge, incontinence. No recent urinary tract infection. Locomotor:  See HPI   No need for assistance with ambulation. Neuropsychiatric:  Denies numbness, tingling, neuropathy  No dizziness, syncopal episodes, weakness, or vertigo  No behavioral or psych issues    OBJECTIVE:   VITALS:  height is 5' 7\" (1.702 m) and weight is 202 lb 13.2 oz (92 kg).   /71  P 84 R 20 SaO2 100% T 97.5    CONSTITUTIONAL:This is a 38

## 2018-04-09 ENCOUNTER — OFFICE VISIT (OUTPATIENT)
Dept: FAMILY MEDICINE CLINIC | Age: 38
End: 2018-04-09
Payer: COMMERCIAL

## 2018-04-09 VITALS
HEART RATE: 89 BPM | HEIGHT: 67 IN | DIASTOLIC BLOOD PRESSURE: 70 MMHG | OXYGEN SATURATION: 98 % | TEMPERATURE: 98.5 F | WEIGHT: 203 LBS | SYSTOLIC BLOOD PRESSURE: 114 MMHG | BODY MASS INDEX: 31.86 KG/M2

## 2018-04-09 DIAGNOSIS — Z13.220 SCREENING, LIPID: ICD-10-CM

## 2018-04-09 DIAGNOSIS — M54.50 CHRONIC MIDLINE LOW BACK PAIN WITHOUT SCIATICA: Primary | ICD-10-CM

## 2018-04-09 DIAGNOSIS — G89.29 CHRONIC MIDLINE LOW BACK PAIN WITHOUT SCIATICA: Primary | ICD-10-CM

## 2018-04-09 DIAGNOSIS — F98.8 ATTENTION DEFICIT DISORDER, UNSPECIFIED HYPERACTIVITY PRESENCE: ICD-10-CM

## 2018-04-09 PROCEDURE — G8427 DOCREV CUR MEDS BY ELIG CLIN: HCPCS | Performed by: PHYSICIAN ASSISTANT

## 2018-04-09 PROCEDURE — G8417 CALC BMI ABV UP PARAM F/U: HCPCS | Performed by: PHYSICIAN ASSISTANT

## 2018-04-09 PROCEDURE — 99213 OFFICE O/P EST LOW 20 MIN: CPT | Performed by: PHYSICIAN ASSISTANT

## 2018-04-09 PROCEDURE — 1036F TOBACCO NON-USER: CPT | Performed by: PHYSICIAN ASSISTANT

## 2018-04-09 RX ORDER — ATOMOXETINE 40 MG/1
40 CAPSULE ORAL DAILY
Qty: 30 CAPSULE | Refills: 3 | Status: SHIPPED | OUTPATIENT
Start: 2018-04-09 | End: 2018-04-23 | Stop reason: DRUGHIGH

## 2018-04-09 ASSESSMENT — PATIENT HEALTH QUESTIONNAIRE - PHQ9
SUM OF ALL RESPONSES TO PHQ9 QUESTIONS 1 & 2: 0
1. LITTLE INTEREST OR PLEASURE IN DOING THINGS: 0
2. FEELING DOWN, DEPRESSED OR HOPELESS: 0
SUM OF ALL RESPONSES TO PHQ QUESTIONS 1-9: 0

## 2018-04-09 ASSESSMENT — ENCOUNTER SYMPTOMS
VOMITING: 0
DIARRHEA: 0
COUGH: 0
WHEEZING: 0
ABDOMINAL PAIN: 0
CONSTIPATION: 0
COLOR CHANGE: 0
NAUSEA: 0
SHORTNESS OF BREATH: 0

## 2018-04-13 ENCOUNTER — TELEPHONE (OUTPATIENT)
Dept: FAMILY MEDICINE CLINIC | Age: 38
End: 2018-04-13

## 2018-04-16 ENCOUNTER — HOSPITAL ENCOUNTER (OUTPATIENT)
Dept: PREADMISSION TESTING | Age: 38
Discharge: HOME | End: 2018-04-20
Payer: COMMERCIAL

## 2018-04-16 VITALS
OXYGEN SATURATION: 100 % | HEIGHT: 67 IN | RESPIRATION RATE: 18 BRPM | BODY MASS INDEX: 32.15 KG/M2 | SYSTOLIC BLOOD PRESSURE: 120 MMHG | HEART RATE: 89 BPM | WEIGHT: 204.81 LBS | DIASTOLIC BLOOD PRESSURE: 49 MMHG

## 2018-04-16 LAB
HCT VFR BLD CALC: 43.1 % (ref 36–46)
HEMOGLOBIN: 14.5 G/DL (ref 12–16)
MCH RBC QN AUTO: 31.3 PG (ref 26–34)
MCHC RBC AUTO-ENTMCNC: 33.5 G/DL (ref 31–37)
MCV RBC AUTO: 93.4 FL (ref 80–100)
NRBC AUTOMATED: NORMAL PER 100 WBC
PDW BLD-RTO: 12.9 % (ref 11.5–14.5)
PLATELET # BLD: 281 K/UL (ref 130–400)
PMV BLD AUTO: 8.3 FL (ref 6–12)
RBC # BLD: 4.62 M/UL (ref 4–5.2)
WBC # BLD: 8.6 K/UL (ref 3.5–11)

## 2018-04-16 PROCEDURE — 85027 COMPLETE CBC AUTOMATED: CPT

## 2018-04-16 PROCEDURE — 36415 COLL VENOUS BLD VENIPUNCTURE: CPT

## 2018-04-16 RX ORDER — MODAFINIL 200 MG/1
200 TABLET ORAL DAILY
Status: ON HOLD | COMMUNITY
End: 2018-04-23 | Stop reason: ALTCHOICE

## 2018-04-16 ASSESSMENT — PAIN DESCRIPTION - PAIN TYPE: TYPE: ACUTE PAIN

## 2018-04-16 ASSESSMENT — PAIN DESCRIPTION - DIRECTION: RADIATING_TOWARDS: DOWN THE LEFT LEG

## 2018-04-16 ASSESSMENT — PAIN DESCRIPTION - DESCRIPTORS: DESCRIPTORS: SHARP;DULL

## 2018-04-16 ASSESSMENT — PAIN DESCRIPTION - FREQUENCY: FREQUENCY: CONTINUOUS

## 2018-04-16 ASSESSMENT — PAIN DESCRIPTION - LOCATION: LOCATION: BUTTOCKS

## 2018-04-16 ASSESSMENT — PAIN DESCRIPTION - PROGRESSION: CLINICAL_PROGRESSION: NOT CHANGED

## 2018-04-16 ASSESSMENT — PAIN SCALES - GENERAL: PAINLEVEL_OUTOF10: 7

## 2018-04-16 ASSESSMENT — PAIN DESCRIPTION - ORIENTATION: ORIENTATION: LEFT

## 2018-04-20 ENCOUNTER — ANESTHESIA EVENT (OUTPATIENT)
Dept: OPERATING ROOM | Age: 38
End: 2018-04-20
Payer: COMMERCIAL

## 2018-04-23 ENCOUNTER — APPOINTMENT (OUTPATIENT)
Dept: GENERAL RADIOLOGY | Age: 38
End: 2018-04-23
Attending: NEUROLOGICAL SURGERY
Payer: COMMERCIAL

## 2018-04-23 ENCOUNTER — PATIENT MESSAGE (OUTPATIENT)
Dept: FAMILY MEDICINE CLINIC | Age: 38
End: 2018-04-23

## 2018-04-23 ENCOUNTER — ANESTHESIA (OUTPATIENT)
Dept: OPERATING ROOM | Age: 38
End: 2018-04-23
Payer: COMMERCIAL

## 2018-04-23 ENCOUNTER — HOSPITAL ENCOUNTER (OUTPATIENT)
Age: 38
Setting detail: OUTPATIENT SURGERY
Discharge: HOME | End: 2018-04-23
Attending: NEUROLOGICAL SURGERY | Admitting: NEUROLOGICAL SURGERY
Payer: COMMERCIAL

## 2018-04-23 VITALS — TEMPERATURE: 96.6 F | OXYGEN SATURATION: 100 % | SYSTOLIC BLOOD PRESSURE: 133 MMHG | DIASTOLIC BLOOD PRESSURE: 76 MMHG

## 2018-04-23 VITALS
WEIGHT: 204.81 LBS | HEART RATE: 79 BPM | SYSTOLIC BLOOD PRESSURE: 106 MMHG | DIASTOLIC BLOOD PRESSURE: 60 MMHG | HEIGHT: 67 IN | TEMPERATURE: 97 F | BODY MASS INDEX: 32.15 KG/M2 | RESPIRATION RATE: 15 BRPM | OXYGEN SATURATION: 96 %

## 2018-04-23 DIAGNOSIS — M54.42 CHRONIC MIDLINE LOW BACK PAIN WITH LEFT-SIDED SCIATICA: Primary | ICD-10-CM

## 2018-04-23 DIAGNOSIS — G89.29 CHRONIC MIDLINE LOW BACK PAIN WITH LEFT-SIDED SCIATICA: Primary | ICD-10-CM

## 2018-04-23 LAB — HCG, PREGNANCY URINE (POC): NEGATIVE

## 2018-04-23 PROCEDURE — 3700000001 HC ADD 15 MINUTES (ANESTHESIA): Performed by: NEUROLOGICAL SURGERY

## 2018-04-23 PROCEDURE — 84703 CHORIONIC GONADOTROPIN ASSAY: CPT

## 2018-04-23 PROCEDURE — 3209999900 FLUORO FOR SURGICAL PROCEDURES

## 2018-04-23 PROCEDURE — 6360000002 HC RX W HCPCS: Performed by: ANESTHESIOLOGY

## 2018-04-23 PROCEDURE — 7100000000 HC PACU RECOVERY - FIRST 15 MIN: Performed by: NEUROLOGICAL SURGERY

## 2018-04-23 PROCEDURE — 7100000001 HC PACU RECOVERY - ADDTL 15 MIN: Performed by: NEUROLOGICAL SURGERY

## 2018-04-23 PROCEDURE — 2580000003 HC RX 258: Performed by: NEUROLOGICAL SURGERY

## 2018-04-23 PROCEDURE — 7100000010 HC PHASE II RECOVERY - FIRST 15 MIN: Performed by: NEUROLOGICAL SURGERY

## 2018-04-23 PROCEDURE — 2580000003 HC RX 258: Performed by: ANESTHESIOLOGY

## 2018-04-23 PROCEDURE — 6360000002 HC RX W HCPCS: Performed by: SPECIALIST

## 2018-04-23 PROCEDURE — 2500000003 HC RX 250 WO HCPCS: Performed by: NEUROLOGICAL SURGERY

## 2018-04-23 PROCEDURE — 3700000000 HC ANESTHESIA ATTENDED CARE: Performed by: NEUROLOGICAL SURGERY

## 2018-04-23 PROCEDURE — 3600000012 HC SURGERY LEVEL 2 ADDTL 15MIN: Performed by: NEUROLOGICAL SURGERY

## 2018-04-23 PROCEDURE — 2500000003 HC RX 250 WO HCPCS: Performed by: SPECIALIST

## 2018-04-23 PROCEDURE — 6360000002 HC RX W HCPCS: Performed by: NEUROLOGICAL SURGERY

## 2018-04-23 PROCEDURE — 2720000010 HC SURG SUPPLY STERILE: Performed by: NEUROLOGICAL SURGERY

## 2018-04-23 PROCEDURE — 2780000010 HC IMPLANT OTHER: Performed by: NEUROLOGICAL SURGERY

## 2018-04-23 PROCEDURE — 3600000002 HC SURGERY LEVEL 2 BASE: Performed by: NEUROLOGICAL SURGERY

## 2018-04-23 PROCEDURE — 7100000011 HC PHASE II RECOVERY - ADDTL 15 MIN: Performed by: NEUROLOGICAL SURGERY

## 2018-04-23 DEVICE — AGENT HEMOSTATIC SURGIFLOW MATRIX KIT W/THROMBIN: Type: IMPLANTABLE DEVICE | Site: BACK | Status: FUNCTIONAL

## 2018-04-23 DEVICE — DURAGEN® DURAL GRAFT MATRIX 1 PK, 1X1 DOM
Type: IMPLANTABLE DEVICE | Site: BACK | Status: FUNCTIONAL
Brand: DURAGEN®

## 2018-04-23 RX ORDER — FENTANYL CITRATE 50 UG/ML
50 INJECTION, SOLUTION INTRAMUSCULAR; INTRAVENOUS EVERY 5 MIN PRN
Status: DISCONTINUED | OUTPATIENT
Start: 2018-04-23 | End: 2018-04-23 | Stop reason: HOSPADM

## 2018-04-23 RX ORDER — ATOMOXETINE 80 MG/1
80 CAPSULE ORAL DAILY
Qty: 30 CAPSULE | Refills: 3 | Status: SHIPPED | OUTPATIENT
Start: 2018-04-23 | End: 2018-07-02 | Stop reason: SDUPTHER

## 2018-04-23 RX ORDER — PROPOFOL 10 MG/ML
INJECTION, EMULSION INTRAVENOUS PRN
Status: DISCONTINUED | OUTPATIENT
Start: 2018-04-23 | End: 2018-04-23 | Stop reason: SDUPTHER

## 2018-04-23 RX ORDER — LIDOCAINE HYDROCHLORIDE AND EPINEPHRINE 5; 5 MG/ML; UG/ML
INJECTION, SOLUTION INFILTRATION; PERINEURAL PRN
Status: DISCONTINUED | OUTPATIENT
Start: 2018-04-23 | End: 2018-04-23 | Stop reason: HOSPADM

## 2018-04-23 RX ORDER — GLYCOPYRROLATE 0.2 MG/ML
INJECTION INTRAMUSCULAR; INTRAVENOUS PRN
Status: DISCONTINUED | OUTPATIENT
Start: 2018-04-23 | End: 2018-04-23 | Stop reason: SDUPTHER

## 2018-04-23 RX ORDER — HYDROMORPHONE HCL 110MG/55ML
0.25 PATIENT CONTROLLED ANALGESIA SYRINGE INTRAVENOUS EVERY 5 MIN PRN
Status: DISCONTINUED | OUTPATIENT
Start: 2018-04-23 | End: 2018-04-23 | Stop reason: HOSPADM

## 2018-04-23 RX ORDER — LIDOCAINE HYDROCHLORIDE 10 MG/ML
1 INJECTION, SOLUTION EPIDURAL; INFILTRATION; INTRACAUDAL; PERINEURAL
Status: DISCONTINUED | OUTPATIENT
Start: 2018-04-24 | End: 2018-04-23 | Stop reason: HOSPADM

## 2018-04-23 RX ORDER — OXYCODONE HYDROCHLORIDE AND ACETAMINOPHEN 5; 325 MG/1; MG/1
1 TABLET ORAL EVERY 6 HOURS PRN
Qty: 28 TABLET | Refills: 0 | Status: SHIPPED | OUTPATIENT
Start: 2018-04-23 | End: 2018-04-30

## 2018-04-23 RX ORDER — PROMETHAZINE HYDROCHLORIDE 25 MG/ML
6.25 INJECTION, SOLUTION INTRAMUSCULAR; INTRAVENOUS
Status: DISCONTINUED | OUTPATIENT
Start: 2018-04-23 | End: 2018-04-23 | Stop reason: HOSPADM

## 2018-04-23 RX ORDER — SODIUM CHLORIDE, SODIUM LACTATE, POTASSIUM CHLORIDE, CALCIUM CHLORIDE 600; 310; 30; 20 MG/100ML; MG/100ML; MG/100ML; MG/100ML
INJECTION, SOLUTION INTRAVENOUS CONTINUOUS
Status: DISCONTINUED | OUTPATIENT
Start: 2018-04-24 | End: 2018-04-23 | Stop reason: HOSPADM

## 2018-04-23 RX ORDER — ROCURONIUM BROMIDE 10 MG/ML
INJECTION, SOLUTION INTRAVENOUS PRN
Status: DISCONTINUED | OUTPATIENT
Start: 2018-04-23 | End: 2018-04-23 | Stop reason: SDUPTHER

## 2018-04-23 RX ORDER — DEXAMETHASONE SODIUM PHOSPHATE 10 MG/ML
INJECTION INTRAMUSCULAR; INTRAVENOUS PRN
Status: DISCONTINUED | OUTPATIENT
Start: 2018-04-23 | End: 2018-04-23 | Stop reason: SDUPTHER

## 2018-04-23 RX ORDER — FENTANYL CITRATE 50 UG/ML
25 INJECTION, SOLUTION INTRAMUSCULAR; INTRAVENOUS EVERY 5 MIN PRN
Status: COMPLETED | OUTPATIENT
Start: 2018-04-23 | End: 2018-04-23

## 2018-04-23 RX ORDER — ONDANSETRON 2 MG/ML
4 INJECTION INTRAMUSCULAR; INTRAVENOUS
Status: COMPLETED | OUTPATIENT
Start: 2018-04-23 | End: 2018-04-23

## 2018-04-23 RX ORDER — HYDROMORPHONE HCL 110MG/55ML
0.5 PATIENT CONTROLLED ANALGESIA SYRINGE INTRAVENOUS EVERY 5 MIN PRN
Status: DISCONTINUED | OUTPATIENT
Start: 2018-04-23 | End: 2018-04-23 | Stop reason: HOSPADM

## 2018-04-23 RX ORDER — OXYCODONE HYDROCHLORIDE AND ACETAMINOPHEN 5; 325 MG/1; MG/1
1 TABLET ORAL EVERY 6 HOURS PRN
Qty: 20 TABLET | Refills: 0 | Status: SHIPPED | OUTPATIENT
Start: 2018-04-23 | End: 2018-04-28

## 2018-04-23 RX ADMIN — ROCURONIUM BROMIDE 40 MG: 10 INJECTION, SOLUTION INTRAVENOUS at 09:41

## 2018-04-23 RX ADMIN — NEOSTIGMINE METHYLSULFATE 3 MG: 1 INJECTION INTRAMUSCULAR; INTRAVENOUS; SUBCUTANEOUS at 11:06

## 2018-04-23 RX ADMIN — Medication 0.4 MG: at 11:06

## 2018-04-23 RX ADMIN — CEFAZOLIN SODIUM 2 G: 2 SOLUTION INTRAVENOUS at 09:54

## 2018-04-23 RX ADMIN — SODIUM CHLORIDE, POTASSIUM CHLORIDE, SODIUM LACTATE AND CALCIUM CHLORIDE: 600; 310; 30; 20 INJECTION, SOLUTION INTRAVENOUS at 09:07

## 2018-04-23 RX ADMIN — SODIUM CHLORIDE, POTASSIUM CHLORIDE, SODIUM LACTATE AND CALCIUM CHLORIDE: 600; 310; 30; 20 INJECTION, SOLUTION INTRAVENOUS at 09:30

## 2018-04-23 RX ADMIN — FENTANYL CITRATE 25 MCG: 50 INJECTION INTRAMUSCULAR; INTRAVENOUS at 12:38

## 2018-04-23 RX ADMIN — PROPOFOL 200 MG: 10 INJECTION, EMULSION INTRAVENOUS at 09:41

## 2018-04-23 RX ADMIN — ONDANSETRON 4 MG: 2 INJECTION INTRAMUSCULAR; INTRAVENOUS at 10:09

## 2018-04-23 RX ADMIN — FENTANYL CITRATE 50 MCG: 50 INJECTION INTRAMUSCULAR; INTRAVENOUS at 11:45

## 2018-04-23 RX ADMIN — FENTANYL CITRATE 25 MCG: 50 INJECTION, SOLUTION INTRAMUSCULAR; INTRAVENOUS at 12:10

## 2018-04-23 RX ADMIN — FENTANYL CITRATE 100 MCG: 50 INJECTION, SOLUTION INTRAMUSCULAR; INTRAVENOUS at 09:41

## 2018-04-23 RX ADMIN — FENTANYL CITRATE 50 MCG: 50 INJECTION, SOLUTION INTRAMUSCULAR; INTRAVENOUS at 11:00

## 2018-04-23 RX ADMIN — DEXAMETHASONE SODIUM PHOSPHATE 10 MG: 10 INJECTION INTRAMUSCULAR; INTRAVENOUS at 10:09

## 2018-04-23 RX ADMIN — FENTANYL CITRATE 100 MCG: 50 INJECTION, SOLUTION INTRAMUSCULAR; INTRAVENOUS at 11:05

## 2018-04-23 ASSESSMENT — PULMONARY FUNCTION TESTS
PIF_VALUE: 23
PIF_VALUE: 21
PIF_VALUE: 22
PIF_VALUE: 24
PIF_VALUE: 23
PIF_VALUE: 22
PIF_VALUE: 24
PIF_VALUE: 21
PIF_VALUE: 21
PIF_VALUE: 23
PIF_VALUE: 25
PIF_VALUE: 25
PIF_VALUE: 12
PIF_VALUE: 23
PIF_VALUE: 23
PIF_VALUE: 11
PIF_VALUE: 23
PIF_VALUE: 0
PIF_VALUE: 3
PIF_VALUE: 0
PIF_VALUE: 24
PIF_VALUE: 21
PIF_VALUE: 23
PIF_VALUE: 24
PIF_VALUE: 22
PIF_VALUE: 0
PIF_VALUE: 25
PIF_VALUE: 23
PIF_VALUE: 26
PIF_VALUE: 21
PIF_VALUE: 3
PIF_VALUE: 23
PIF_VALUE: 22
PIF_VALUE: 24
PIF_VALUE: 16
PIF_VALUE: 11
PIF_VALUE: 24
PIF_VALUE: 13
PIF_VALUE: 24
PIF_VALUE: 25
PIF_VALUE: 23
PIF_VALUE: 1
PIF_VALUE: 22
PIF_VALUE: 24
PIF_VALUE: 23
PIF_VALUE: 14
PIF_VALUE: 15
PIF_VALUE: 3
PIF_VALUE: 23
PIF_VALUE: 24
PIF_VALUE: 24
PIF_VALUE: 25
PIF_VALUE: 23
PIF_VALUE: 23
PIF_VALUE: 0
PIF_VALUE: 3
PIF_VALUE: 24
PIF_VALUE: 23
PIF_VALUE: 6
PIF_VALUE: 23
PIF_VALUE: 23
PIF_VALUE: 3
PIF_VALUE: 23
PIF_VALUE: 28
PIF_VALUE: 23
PIF_VALUE: 18
PIF_VALUE: 23
PIF_VALUE: 22
PIF_VALUE: 21
PIF_VALUE: 24
PIF_VALUE: 3
PIF_VALUE: 2
PIF_VALUE: 3
PIF_VALUE: 23
PIF_VALUE: 23
PIF_VALUE: 3
PIF_VALUE: 22
PIF_VALUE: 2
PIF_VALUE: 23
PIF_VALUE: 3
PIF_VALUE: 23
PIF_VALUE: 16
PIF_VALUE: 23
PIF_VALUE: 5
PIF_VALUE: 3

## 2018-04-23 ASSESSMENT — PAIN SCALES - GENERAL
PAINLEVEL_OUTOF10: 4
PAINLEVEL_OUTOF10: 6
PAINLEVEL_OUTOF10: 9
PAINLEVEL_OUTOF10: 4
PAINLEVEL_OUTOF10: 9

## 2018-04-23 ASSESSMENT — PAIN DESCRIPTION - DESCRIPTORS: DESCRIPTORS: SHARP;DULL

## 2018-04-23 ASSESSMENT — PAIN DESCRIPTION - PAIN TYPE: TYPE: SURGICAL PAIN

## 2018-05-06 ENCOUNTER — PATIENT MESSAGE (OUTPATIENT)
Dept: FAMILY MEDICINE CLINIC | Age: 38
End: 2018-05-06

## 2018-05-06 DIAGNOSIS — I83.90 VARICOSE VEIN OF LEG: Primary | ICD-10-CM

## 2018-05-13 ENCOUNTER — PATIENT MESSAGE (OUTPATIENT)
Dept: FAMILY MEDICINE CLINIC | Age: 38
End: 2018-05-13

## 2018-05-13 DIAGNOSIS — N94.6 DYSMENORRHEA: Primary | ICD-10-CM

## 2018-05-14 ENCOUNTER — OFFICE VISIT (OUTPATIENT)
Dept: FAMILY MEDICINE CLINIC | Age: 38
End: 2018-05-14
Payer: COMMERCIAL

## 2018-05-14 ENCOUNTER — HOSPITAL ENCOUNTER (OUTPATIENT)
Age: 38
Setting detail: SPECIMEN
Discharge: HOME | End: 2018-05-14
Payer: COMMERCIAL

## 2018-05-14 VITALS
HEART RATE: 97 BPM | TEMPERATURE: 98.5 F | SYSTOLIC BLOOD PRESSURE: 128 MMHG | BODY MASS INDEX: 32.17 KG/M2 | DIASTOLIC BLOOD PRESSURE: 74 MMHG | WEIGHT: 205.4 LBS | OXYGEN SATURATION: 98 %

## 2018-05-14 DIAGNOSIS — N94.6 DYSMENORRHEA: ICD-10-CM

## 2018-05-14 DIAGNOSIS — L74.0 HEAT RASH: Primary | ICD-10-CM

## 2018-05-14 LAB
FOLLICLE STIMULATING HORMONE: 71.3 U/L (ref 1.7–21.5)
LH: 54.2 U/L (ref 1–95.6)
SEX HORMONE BINDING GLOBULIN: 54 NMOL/L (ref 30–135)
TESTOSTERONE FREE-NONMALE: 1.4 PG/ML (ref 1.3–9.2)
TESTOSTERONE TOTAL: 11 NG/DL (ref 20–70)
TSH SERPL DL<=0.05 MIU/L-ACNC: 2.86 MIU/L (ref 0.3–5)

## 2018-05-14 PROCEDURE — G8417 CALC BMI ABV UP PARAM F/U: HCPCS | Performed by: PHYSICIAN ASSISTANT

## 2018-05-14 PROCEDURE — G8427 DOCREV CUR MEDS BY ELIG CLIN: HCPCS | Performed by: PHYSICIAN ASSISTANT

## 2018-05-14 PROCEDURE — 1036F TOBACCO NON-USER: CPT | Performed by: PHYSICIAN ASSISTANT

## 2018-05-14 PROCEDURE — 99213 OFFICE O/P EST LOW 20 MIN: CPT | Performed by: PHYSICIAN ASSISTANT

## 2018-05-14 RX ORDER — TIZANIDINE 4 MG/1
4 TABLET ORAL EVERY 6 HOURS PRN
COMMUNITY
End: 2018-06-05

## 2018-05-14 RX ORDER — TRIAMCINOLONE ACETONIDE 1 MG/G
CREAM TOPICAL
Qty: 60 G | Refills: 0 | Status: SHIPPED | OUTPATIENT
Start: 2018-05-14 | End: 2018-08-28 | Stop reason: SDUPTHER

## 2018-05-14 ASSESSMENT — ENCOUNTER SYMPTOMS: COLOR CHANGE: 0

## 2018-05-16 LAB
ESTRADIOL LEVEL: 14.5 PG/ML
ESTROGEN TOTAL: 38.5 PG/ML
ESTRONE: 24 PG/ML

## 2018-05-17 ENCOUNTER — PATIENT MESSAGE (OUTPATIENT)
Dept: FAMILY MEDICINE CLINIC | Age: 38
End: 2018-05-17

## 2018-05-17 ENCOUNTER — INITIAL CONSULT (OUTPATIENT)
Dept: VASCULAR SURGERY | Age: 38
End: 2018-05-17
Payer: COMMERCIAL

## 2018-05-17 VITALS
HEART RATE: 113 BPM | OXYGEN SATURATION: 98 % | DIASTOLIC BLOOD PRESSURE: 78 MMHG | WEIGHT: 205.47 LBS | RESPIRATION RATE: 17 BRPM | SYSTOLIC BLOOD PRESSURE: 128 MMHG | BODY MASS INDEX: 32.25 KG/M2 | HEIGHT: 67 IN

## 2018-05-17 DIAGNOSIS — I83.893 VARICOSE VEINS OF BOTH LEGS WITH EDEMA: Primary | ICD-10-CM

## 2018-05-17 PROCEDURE — 1036F TOBACCO NON-USER: CPT | Performed by: SURGERY

## 2018-05-17 PROCEDURE — G8427 DOCREV CUR MEDS BY ELIG CLIN: HCPCS | Performed by: SURGERY

## 2018-05-17 PROCEDURE — G8417 CALC BMI ABV UP PARAM F/U: HCPCS | Performed by: SURGERY

## 2018-05-17 PROCEDURE — 99203 OFFICE O/P NEW LOW 30 MIN: CPT | Performed by: SURGERY

## 2018-05-18 ENCOUNTER — PATIENT MESSAGE (OUTPATIENT)
Dept: FAMILY MEDICINE CLINIC | Age: 38
End: 2018-05-18

## 2018-05-18 ENCOUNTER — HOSPITAL ENCOUNTER (OUTPATIENT)
Age: 38
Setting detail: SPECIMEN
Discharge: HOME | End: 2018-05-18
Payer: COMMERCIAL

## 2018-05-18 DIAGNOSIS — F98.8 ATTENTION DEFICIT DISORDER, UNSPECIFIED HYPERACTIVITY PRESENCE: ICD-10-CM

## 2018-05-18 DIAGNOSIS — Z13.220 SCREENING, LIPID: ICD-10-CM

## 2018-05-18 LAB
ABSOLUTE EOS #: 0.21 K/UL (ref 0–0.44)
ABSOLUTE IMMATURE GRANULOCYTE: 0.03 K/UL (ref 0–0.3)
ABSOLUTE LYMPH #: 2.17 K/UL (ref 1.1–3.7)
ABSOLUTE MONO #: 0.57 K/UL (ref 0.1–1.2)
ALBUMIN SERPL-MCNC: 4.3 G/DL (ref 3.5–5.2)
ALBUMIN/GLOBULIN RATIO: 1.5 (ref 1–2.5)
ALP BLD-CCNC: 63 U/L (ref 35–104)
ALT SERPL-CCNC: 42 U/L (ref 5–33)
ANION GAP SERPL CALCULATED.3IONS-SCNC: 15 MMOL/L (ref 9–17)
AST SERPL-CCNC: 23 U/L
BASOPHILS # BLD: 0 % (ref 0–2)
BASOPHILS ABSOLUTE: 0.03 K/UL (ref 0–0.2)
BILIRUB SERPL-MCNC: 0.57 MG/DL (ref 0.3–1.2)
BUN BLDV-MCNC: 17 MG/DL (ref 6–20)
BUN/CREAT BLD: ABNORMAL (ref 9–20)
CALCIUM SERPL-MCNC: 9.4 MG/DL (ref 8.6–10.4)
CHLORIDE BLD-SCNC: 104 MMOL/L (ref 98–107)
CHOLESTEROL/HDL RATIO: 5.1
CHOLESTEROL: 247 MG/DL
CO2: 23 MMOL/L (ref 20–31)
CREAT SERPL-MCNC: 0.59 MG/DL (ref 0.5–0.9)
DIFFERENTIAL TYPE: ABNORMAL
EOSINOPHILS RELATIVE PERCENT: 3 % (ref 1–4)
GFR AFRICAN AMERICAN: >60 ML/MIN
GFR NON-AFRICAN AMERICAN: >60 ML/MIN
GFR SERPL CREATININE-BSD FRML MDRD: ABNORMAL ML/MIN/{1.73_M2}
GFR SERPL CREATININE-BSD FRML MDRD: ABNORMAL ML/MIN/{1.73_M2}
GLUCOSE BLD-MCNC: 84 MG/DL (ref 70–99)
HCT VFR BLD CALC: 43.2 % (ref 36.3–47.1)
HDLC SERPL-MCNC: 48 MG/DL
HEMOGLOBIN: 13.6 G/DL (ref 11.9–15.1)
IMMATURE GRANULOCYTES: 0 %
LDL CHOLESTEROL: 145 MG/DL (ref 0–130)
LYMPHOCYTES # BLD: 31 % (ref 24–43)
MCH RBC QN AUTO: 30.2 PG (ref 25.2–33.5)
MCHC RBC AUTO-ENTMCNC: 31.5 G/DL (ref 28.4–34.8)
MCV RBC AUTO: 95.8 FL (ref 82.6–102.9)
MONOCYTES # BLD: 8 % (ref 3–12)
NRBC AUTOMATED: 0 PER 100 WBC
PDW BLD-RTO: 11.4 % (ref 11.8–14.4)
PLATELET # BLD: 240 K/UL (ref 138–453)
PLATELET ESTIMATE: ABNORMAL
PMV BLD AUTO: 11 FL (ref 8.1–13.5)
POTASSIUM SERPL-SCNC: 5 MMOL/L (ref 3.7–5.3)
RBC # BLD: 4.51 M/UL (ref 3.95–5.11)
RBC # BLD: ABNORMAL 10*6/UL
SEG NEUTROPHILS: 58 % (ref 36–65)
SEGMENTED NEUTROPHILS ABSOLUTE COUNT: 3.95 K/UL (ref 1.5–8.1)
SODIUM BLD-SCNC: 142 MMOL/L (ref 135–144)
TOTAL PROTEIN: 7.2 G/DL (ref 6.4–8.3)
TRIGL SERPL-MCNC: 268 MG/DL
VLDLC SERPL CALC-MCNC: ABNORMAL MG/DL (ref 1–30)
WBC # BLD: 7 K/UL (ref 3.5–11.3)
WBC # BLD: ABNORMAL 10*3/UL

## 2018-05-21 ENCOUNTER — HOSPITAL ENCOUNTER (OUTPATIENT)
Dept: VASCULAR LAB | Age: 38
Discharge: HOME | End: 2018-05-21
Payer: COMMERCIAL

## 2018-05-21 DIAGNOSIS — I83.893 VARICOSE VEINS OF BOTH LEGS WITH EDEMA: ICD-10-CM

## 2018-05-21 PROCEDURE — 93970 EXTREMITY STUDY: CPT

## 2018-06-04 ENCOUNTER — OFFICE VISIT (OUTPATIENT)
Dept: OBGYN CLINIC | Age: 38
End: 2018-06-04
Payer: COMMERCIAL

## 2018-06-04 VITALS
HEIGHT: 67 IN | HEART RATE: 74 BPM | SYSTOLIC BLOOD PRESSURE: 124 MMHG | WEIGHT: 214 LBS | BODY MASS INDEX: 33.59 KG/M2 | DIASTOLIC BLOOD PRESSURE: 76 MMHG

## 2018-06-04 DIAGNOSIS — N95.1 PERIMENOPAUSAL VASOMOTOR SYMPTOMS: Primary | ICD-10-CM

## 2018-06-04 PROCEDURE — 99203 OFFICE O/P NEW LOW 30 MIN: CPT | Performed by: ADVANCED PRACTICE MIDWIFE

## 2018-06-04 PROCEDURE — G8417 CALC BMI ABV UP PARAM F/U: HCPCS | Performed by: ADVANCED PRACTICE MIDWIFE

## 2018-06-04 PROCEDURE — 1036F TOBACCO NON-USER: CPT | Performed by: ADVANCED PRACTICE MIDWIFE

## 2018-06-04 PROCEDURE — G8428 CUR MEDS NOT DOCUMENT: HCPCS | Performed by: ADVANCED PRACTICE MIDWIFE

## 2018-06-04 ASSESSMENT — ENCOUNTER SYMPTOMS
EYES NEGATIVE: 1
ALLERGIC/IMMUNOLOGIC NEGATIVE: 1
RESPIRATORY NEGATIVE: 1
GASTROINTESTINAL NEGATIVE: 1

## 2018-06-05 ENCOUNTER — HOSPITAL ENCOUNTER (OUTPATIENT)
Dept: PREADMISSION TESTING | Age: 38
Discharge: HOME | End: 2018-06-09
Payer: COMMERCIAL

## 2018-06-05 VITALS
HEART RATE: 99 BPM | DIASTOLIC BLOOD PRESSURE: 77 MMHG | HEIGHT: 67 IN | OXYGEN SATURATION: 100 % | SYSTOLIC BLOOD PRESSURE: 133 MMHG | BODY MASS INDEX: 33.18 KG/M2 | WEIGHT: 211.42 LBS | RESPIRATION RATE: 18 BRPM

## 2018-06-05 LAB
ANION GAP SERPL CALCULATED.3IONS-SCNC: 13 MMOL/L (ref 9–17)
CHLORIDE BLD-SCNC: 99 MMOL/L (ref 98–107)
CO2: 27 MMOL/L (ref 20–31)
MRSA, DNA, NASAL: NORMAL
POTASSIUM SERPL-SCNC: 3.9 MMOL/L (ref 3.7–5.3)
SODIUM BLD-SCNC: 139 MMOL/L (ref 135–144)
SPECIMEN DESCRIPTION: NORMAL

## 2018-06-05 PROCEDURE — 86900 BLOOD TYPING SEROLOGIC ABO: CPT

## 2018-06-05 PROCEDURE — 86901 BLOOD TYPING SEROLOGIC RH(D): CPT

## 2018-06-05 PROCEDURE — 80051 ELECTROLYTE PANEL: CPT

## 2018-06-05 PROCEDURE — 36415 COLL VENOUS BLD VENIPUNCTURE: CPT

## 2018-06-05 PROCEDURE — 86850 RBC ANTIBODY SCREEN: CPT

## 2018-06-05 PROCEDURE — 87641 MR-STAPH DNA AMP PROBE: CPT

## 2018-06-05 RX ORDER — FLUTICASONE PROPIONATE 50 MCG
2 SPRAY, SUSPENSION (ML) NASAL 2 TIMES DAILY
COMMUNITY
End: 2018-08-28 | Stop reason: SDUPTHER

## 2018-06-05 RX ORDER — TIZANIDINE 2 MG/1
2 TABLET ORAL EVERY 8 HOURS PRN
COMMUNITY
End: 2019-04-15 | Stop reason: ALTCHOICE

## 2018-06-05 RX ORDER — DIPHENHYDRAMINE HCL 25 MG
25 TABLET ORAL EVERY 6 HOURS PRN
COMMUNITY
End: 2021-11-18

## 2018-06-05 RX ORDER — GRAPE SEED EXT/BIOFLAV,CITRUS 50MG-250MG
1 CAPSULE ORAL 2 TIMES DAILY
COMMUNITY
End: 2019-01-14 | Stop reason: ALTCHOICE

## 2018-06-05 RX ORDER — M-VIT,TX,IRON,MINS/CALC/FOLIC 27MG-0.4MG
1 TABLET ORAL DAILY
COMMUNITY

## 2018-06-05 RX ORDER — LANOLIN ALCOHOL/MO/W.PET/CERES
1000 CREAM (GRAM) TOPICAL DAILY
COMMUNITY

## 2018-06-05 ASSESSMENT — PAIN SCALES - GENERAL: PAINLEVEL_OUTOF10: 3

## 2018-06-05 ASSESSMENT — PAIN DESCRIPTION - LOCATION: LOCATION: BACK

## 2018-06-05 ASSESSMENT — PAIN DESCRIPTION - PROGRESSION: CLINICAL_PROGRESSION: GRADUALLY WORSENING

## 2018-06-05 ASSESSMENT — PAIN DESCRIPTION - FREQUENCY: FREQUENCY: CONTINUOUS

## 2018-06-05 ASSESSMENT — PAIN DESCRIPTION - ONSET: ONSET: ON-GOING

## 2018-06-05 ASSESSMENT — PAIN DESCRIPTION - PAIN TYPE: TYPE: CHRONIC PAIN

## 2018-06-08 LAB
ABO/RH: NORMAL
ANTIBODY SCREEN: NEGATIVE
ARM BAND NUMBER: NORMAL
EXPIRATION DATE: NORMAL

## 2018-06-22 ENCOUNTER — ANESTHESIA EVENT (OUTPATIENT)
Dept: OPERATING ROOM | Age: 38
DRG: 460 | End: 2018-06-22
Payer: COMMERCIAL

## 2018-06-25 ENCOUNTER — ANESTHESIA (OUTPATIENT)
Dept: OPERATING ROOM | Age: 38
DRG: 460 | End: 2018-06-25
Payer: COMMERCIAL

## 2018-06-25 ENCOUNTER — HOSPITAL ENCOUNTER (INPATIENT)
Age: 38
LOS: 2 days | Discharge: HOME | DRG: 460 | End: 2018-06-27
Attending: NEUROLOGICAL SURGERY | Admitting: NEUROLOGICAL SURGERY
Payer: COMMERCIAL

## 2018-06-25 ENCOUNTER — APPOINTMENT (OUTPATIENT)
Dept: GENERAL RADIOLOGY | Age: 38
DRG: 460 | End: 2018-06-25
Attending: NEUROLOGICAL SURGERY
Payer: COMMERCIAL

## 2018-06-25 VITALS — TEMPERATURE: 97.2 F | SYSTOLIC BLOOD PRESSURE: 128 MMHG | DIASTOLIC BLOOD PRESSURE: 61 MMHG | OXYGEN SATURATION: 100 %

## 2018-06-25 DIAGNOSIS — M54.42 CHRONIC MIDLINE LOW BACK PAIN WITH LEFT-SIDED SCIATICA: Primary | ICD-10-CM

## 2018-06-25 DIAGNOSIS — G89.29 CHRONIC MIDLINE LOW BACK PAIN WITH LEFT-SIDED SCIATICA: Primary | ICD-10-CM

## 2018-06-25 PROBLEM — M51.369 DEGENERATIVE DISC DISEASE, LUMBAR: Status: ACTIVE | Noted: 2018-06-25

## 2018-06-25 PROBLEM — M51.36 DEGENERATIVE DISC DISEASE, LUMBAR: Status: ACTIVE | Noted: 2018-06-25

## 2018-06-25 LAB
GLUCOSE BLD-MCNC: 186 MG/DL (ref 65–105)
HCG, PREGNANCY URINE (POC): NEGATIVE

## 2018-06-25 PROCEDURE — 82947 ASSAY GLUCOSE BLOOD QUANT: CPT

## 2018-06-25 PROCEDURE — C1713 ANCHOR/SCREW BN/BN,TIS/BN: HCPCS | Performed by: NEUROLOGICAL SURGERY

## 2018-06-25 PROCEDURE — 2500000003 HC RX 250 WO HCPCS: Performed by: NEUROLOGICAL SURGERY

## 2018-06-25 PROCEDURE — 1200000000 HC SEMI PRIVATE

## 2018-06-25 PROCEDURE — 6360000002 HC RX W HCPCS: Performed by: NURSE ANESTHETIST, CERTIFIED REGISTERED

## 2018-06-25 PROCEDURE — 6360000002 HC RX W HCPCS: Performed by: ANESTHESIOLOGY

## 2018-06-25 PROCEDURE — 6370000000 HC RX 637 (ALT 250 FOR IP): Performed by: NEUROLOGICAL SURGERY

## 2018-06-25 PROCEDURE — 2580000003 HC RX 258: Performed by: NEUROLOGICAL SURGERY

## 2018-06-25 PROCEDURE — 2580000003 HC RX 258: Performed by: NURSE ANESTHETIST, CERTIFIED REGISTERED

## 2018-06-25 PROCEDURE — 6360000002 HC RX W HCPCS: Performed by: NEUROLOGICAL SURGERY

## 2018-06-25 PROCEDURE — 0QP004Z REMOVAL OF INTERNAL FIXATION DEVICE FROM LUMBAR VERTEBRA, OPEN APPROACH: ICD-10-PCS | Performed by: NEUROLOGICAL SURGERY

## 2018-06-25 PROCEDURE — 7100000000 HC PACU RECOVERY - FIRST 15 MIN: Performed by: NEUROLOGICAL SURGERY

## 2018-06-25 PROCEDURE — 3209999900 FLUORO FOR SURGICAL PROCEDURES

## 2018-06-25 PROCEDURE — A6402 STERILE GAUZE <= 16 SQ IN: HCPCS | Performed by: NEUROLOGICAL SURGERY

## 2018-06-25 PROCEDURE — C9362 IMPLNT,BON VOID FILLER-STRIP: HCPCS | Performed by: NEUROLOGICAL SURGERY

## 2018-06-25 PROCEDURE — 3700000000 HC ANESTHESIA ATTENDED CARE: Performed by: NEUROLOGICAL SURGERY

## 2018-06-25 PROCEDURE — 2500000003 HC RX 250 WO HCPCS: Performed by: NURSE ANESTHETIST, CERTIFIED REGISTERED

## 2018-06-25 PROCEDURE — 72100 X-RAY EXAM L-S SPINE 2/3 VWS: CPT

## 2018-06-25 PROCEDURE — 7100000001 HC PACU RECOVERY - ADDTL 15 MIN: Performed by: NEUROLOGICAL SURGERY

## 2018-06-25 PROCEDURE — 2580000003 HC RX 258: Performed by: ANESTHESIOLOGY

## 2018-06-25 PROCEDURE — L8699 PROSTHETIC IMPLANT NOS: HCPCS | Performed by: NEUROLOGICAL SURGERY

## 2018-06-25 PROCEDURE — 2780000010 HC IMPLANT OTHER: Performed by: NEUROLOGICAL SURGERY

## 2018-06-25 PROCEDURE — 3600000014 HC SURGERY LEVEL 4 ADDTL 15MIN: Performed by: NEUROLOGICAL SURGERY

## 2018-06-25 PROCEDURE — 3600000004 HC SURGERY LEVEL 4 BASE: Performed by: NEUROLOGICAL SURGERY

## 2018-06-25 PROCEDURE — 3700000001 HC ADD 15 MINUTES (ANESTHESIA): Performed by: NEUROLOGICAL SURGERY

## 2018-06-25 PROCEDURE — 84703 CHORIONIC GONADOTROPIN ASSAY: CPT

## 2018-06-25 PROCEDURE — 0SG00AJ FUSION OF LUMBAR VERTEBRAL JOINT WITH INTERBODY FUSION DEVICE, POSTERIOR APPROACH, ANTERIOR COLUMN, OPEN APPROACH: ICD-10-PCS | Performed by: NEUROLOGICAL SURGERY

## 2018-06-25 DEVICE — IMPLANTABLE DEVICE: Type: IMPLANTABLE DEVICE | Site: BACK | Status: FUNCTIONAL

## 2018-06-25 DEVICE — SET SCR SPNL STD PEEK SILVERTON: Type: IMPLANTABLE DEVICE | Site: BACK | Status: FUNCTIONAL

## 2018-06-25 DEVICE — SCREW SPNL L40MM DIA6.5MM POLYAX CANN: Type: IMPLANTABLE DEVICE | Site: BACK | Status: FUNCTIONAL

## 2018-06-25 DEVICE — GRAFT BNE SUB 7.2CC W15XL50MM MINERALIZED CLLGN SCFLD: Type: IMPLANTABLE DEVICE | Site: BACK | Status: FUNCTIONAL

## 2018-06-25 DEVICE — AGENT HEMOSTATIC SURGIFLOW MATRIX KIT W/THROMBIN: Type: IMPLANTABLE DEVICE | Site: BACK | Status: FUNCTIONAL

## 2018-06-25 RX ORDER — HYDRALAZINE HYDROCHLORIDE 20 MG/ML
5 INJECTION INTRAMUSCULAR; INTRAVENOUS EVERY 10 MIN PRN
Status: DISCONTINUED | OUTPATIENT
Start: 2018-06-25 | End: 2018-06-25 | Stop reason: HOSPADM

## 2018-06-25 RX ORDER — MORPHINE SULFATE 4 MG/ML
4 INJECTION, SOLUTION INTRAMUSCULAR; INTRAVENOUS
Status: DISCONTINUED | OUTPATIENT
Start: 2018-06-25 | End: 2018-06-27 | Stop reason: HOSPADM

## 2018-06-25 RX ORDER — FENTANYL CITRATE 50 UG/ML
INJECTION, SOLUTION INTRAMUSCULAR; INTRAVENOUS PRN
Status: DISCONTINUED | OUTPATIENT
Start: 2018-06-25 | End: 2018-06-25 | Stop reason: SDUPTHER

## 2018-06-25 RX ORDER — FLUTICASONE PROPIONATE 50 MCG
2 SPRAY, SUSPENSION (ML) NASAL 2 TIMES DAILY
Status: DISCONTINUED | OUTPATIENT
Start: 2018-06-25 | End: 2018-06-27 | Stop reason: HOSPADM

## 2018-06-25 RX ORDER — SODIUM CHLORIDE 0.9 % (FLUSH) 0.9 %
10 SYRINGE (ML) INJECTION EVERY 12 HOURS SCHEDULED
Status: DISCONTINUED | OUTPATIENT
Start: 2018-06-25 | End: 2018-06-27 | Stop reason: HOSPADM

## 2018-06-25 RX ORDER — ACETAMINOPHEN 325 MG/1
650 TABLET ORAL EVERY 4 HOURS PRN
Status: DISCONTINUED | OUTPATIENT
Start: 2018-06-25 | End: 2018-06-27 | Stop reason: HOSPADM

## 2018-06-25 RX ORDER — LIDOCAINE HYDROCHLORIDE 20 MG/ML
INJECTION, SOLUTION INFILTRATION; PERINEURAL PRN
Status: DISCONTINUED | OUTPATIENT
Start: 2018-06-25 | End: 2018-06-25 | Stop reason: SDUPTHER

## 2018-06-25 RX ORDER — PROPOFOL 10 MG/ML
INJECTION, EMULSION INTRAVENOUS PRN
Status: DISCONTINUED | OUTPATIENT
Start: 2018-06-25 | End: 2018-06-25 | Stop reason: SDUPTHER

## 2018-06-25 RX ORDER — HYDROMORPHONE HCL 110MG/55ML
0.5 PATIENT CONTROLLED ANALGESIA SYRINGE INTRAVENOUS EVERY 5 MIN PRN
Status: DISCONTINUED | OUTPATIENT
Start: 2018-06-25 | End: 2018-06-25 | Stop reason: HOSPADM

## 2018-06-25 RX ORDER — FENTANYL CITRATE 50 UG/ML
25 INJECTION, SOLUTION INTRAMUSCULAR; INTRAVENOUS EVERY 5 MIN PRN
Status: DISCONTINUED | OUTPATIENT
Start: 2018-06-25 | End: 2018-06-25 | Stop reason: HOSPADM

## 2018-06-25 RX ORDER — OXYCODONE HYDROCHLORIDE AND ACETAMINOPHEN 5; 325 MG/1; MG/1
1 TABLET ORAL EVERY 4 HOURS PRN
Status: DISCONTINUED | OUTPATIENT
Start: 2018-06-25 | End: 2018-06-27 | Stop reason: HOSPADM

## 2018-06-25 RX ORDER — DEXAMETHASONE SODIUM PHOSPHATE 10 MG/ML
INJECTION INTRAMUSCULAR; INTRAVENOUS PRN
Status: DISCONTINUED | OUTPATIENT
Start: 2018-06-25 | End: 2018-06-25 | Stop reason: SDUPTHER

## 2018-06-25 RX ORDER — SODIUM CHLORIDE, SODIUM LACTATE, POTASSIUM CHLORIDE, CALCIUM CHLORIDE 600; 310; 30; 20 MG/100ML; MG/100ML; MG/100ML; MG/100ML
INJECTION, SOLUTION INTRAVENOUS CONTINUOUS PRN
Status: DISCONTINUED | OUTPATIENT
Start: 2018-06-25 | End: 2018-06-25 | Stop reason: SDUPTHER

## 2018-06-25 RX ORDER — SODIUM CHLORIDE 0.9 % (FLUSH) 0.9 %
10 SYRINGE (ML) INJECTION PRN
Status: DISCONTINUED | OUTPATIENT
Start: 2018-06-25 | End: 2018-06-27 | Stop reason: HOSPADM

## 2018-06-25 RX ORDER — MEPERIDINE HYDROCHLORIDE 50 MG/ML
12.5 INJECTION INTRAMUSCULAR; INTRAVENOUS; SUBCUTANEOUS EVERY 5 MIN PRN
Status: DISCONTINUED | OUTPATIENT
Start: 2018-06-25 | End: 2018-06-25 | Stop reason: HOSPADM

## 2018-06-25 RX ORDER — KETAMINE HYDROCHLORIDE 50 MG/ML
INJECTION, SOLUTION, CONCENTRATE INTRAMUSCULAR; INTRAVENOUS PRN
Status: DISCONTINUED | OUTPATIENT
Start: 2018-06-25 | End: 2018-06-25 | Stop reason: SDUPTHER

## 2018-06-25 RX ORDER — DOCUSATE SODIUM 100 MG/1
100 CAPSULE, LIQUID FILLED ORAL 2 TIMES DAILY
Status: DISCONTINUED | OUTPATIENT
Start: 2018-06-25 | End: 2018-06-27 | Stop reason: HOSPADM

## 2018-06-25 RX ORDER — OXYCODONE HYDROCHLORIDE AND ACETAMINOPHEN 5; 325 MG/1; MG/1
2 TABLET ORAL EVERY 4 HOURS PRN
Status: DISCONTINUED | OUTPATIENT
Start: 2018-06-25 | End: 2018-06-27 | Stop reason: HOSPADM

## 2018-06-25 RX ORDER — MORPHINE SULFATE 2 MG/ML
2 INJECTION, SOLUTION INTRAMUSCULAR; INTRAVENOUS
Status: DISCONTINUED | OUTPATIENT
Start: 2018-06-25 | End: 2018-06-27 | Stop reason: HOSPADM

## 2018-06-25 RX ORDER — ATOMOXETINE 80 MG/1
80 CAPSULE ORAL DAILY
Status: DISCONTINUED | OUTPATIENT
Start: 2018-06-25 | End: 2018-06-27 | Stop reason: HOSPADM

## 2018-06-25 RX ORDER — LIDOCAINE HYDROCHLORIDE 10 MG/ML
1 INJECTION, SOLUTION EPIDURAL; INFILTRATION; INTRACAUDAL; PERINEURAL
Status: DISCONTINUED | OUTPATIENT
Start: 2018-06-26 | End: 2018-06-25 | Stop reason: HOSPADM

## 2018-06-25 RX ORDER — DIPHENHYDRAMINE HYDROCHLORIDE 50 MG/ML
12.5 INJECTION INTRAMUSCULAR; INTRAVENOUS
Status: DISCONTINUED | OUTPATIENT
Start: 2018-06-25 | End: 2018-06-25 | Stop reason: HOSPADM

## 2018-06-25 RX ORDER — SODIUM CHLORIDE, SODIUM LACTATE, POTASSIUM CHLORIDE, CALCIUM CHLORIDE 600; 310; 30; 20 MG/100ML; MG/100ML; MG/100ML; MG/100ML
INJECTION, SOLUTION INTRAVENOUS CONTINUOUS
Status: DISCONTINUED | OUTPATIENT
Start: 2018-06-26 | End: 2018-06-25

## 2018-06-25 RX ORDER — ONDANSETRON 2 MG/ML
4 INJECTION INTRAMUSCULAR; INTRAVENOUS
Status: COMPLETED | OUTPATIENT
Start: 2018-06-25 | End: 2018-06-25

## 2018-06-25 RX ORDER — LABETALOL HYDROCHLORIDE 5 MG/ML
5 INJECTION, SOLUTION INTRAVENOUS EVERY 10 MIN PRN
Status: DISCONTINUED | OUTPATIENT
Start: 2018-06-25 | End: 2018-06-25 | Stop reason: HOSPADM

## 2018-06-25 RX ORDER — SENNA AND DOCUSATE SODIUM 50; 8.6 MG/1; MG/1
1 TABLET, FILM COATED ORAL 2 TIMES DAILY PRN
Status: DISCONTINUED | OUTPATIENT
Start: 2018-06-25 | End: 2018-06-27 | Stop reason: HOSPADM

## 2018-06-25 RX ORDER — GABAPENTIN 300 MG/1
600 CAPSULE ORAL 3 TIMES DAILY
Status: DISCONTINUED | OUTPATIENT
Start: 2018-06-25 | End: 2018-06-27 | Stop reason: HOSPADM

## 2018-06-25 RX ORDER — ROCURONIUM BROMIDE 10 MG/ML
INJECTION, SOLUTION INTRAVENOUS PRN
Status: DISCONTINUED | OUTPATIENT
Start: 2018-06-25 | End: 2018-06-25 | Stop reason: SDUPTHER

## 2018-06-25 RX ORDER — LIDOCAINE HYDROCHLORIDE AND EPINEPHRINE 5; 5 MG/ML; UG/ML
INJECTION, SOLUTION INFILTRATION; PERINEURAL PRN
Status: DISCONTINUED | OUTPATIENT
Start: 2018-06-25 | End: 2018-06-25 | Stop reason: HOSPADM

## 2018-06-25 RX ORDER — NEOSTIGMINE METHYLSULFATE 5 MG/5 ML
SYRINGE (ML) INTRAVENOUS PRN
Status: DISCONTINUED | OUTPATIENT
Start: 2018-06-25 | End: 2018-06-25 | Stop reason: SDUPTHER

## 2018-06-25 RX ORDER — ONDANSETRON 2 MG/ML
INJECTION INTRAMUSCULAR; INTRAVENOUS PRN
Status: DISCONTINUED | OUTPATIENT
Start: 2018-06-25 | End: 2018-06-25 | Stop reason: SDUPTHER

## 2018-06-25 RX ORDER — SODIUM CHLORIDE, SODIUM LACTATE, POTASSIUM CHLORIDE, CALCIUM CHLORIDE 600; 310; 30; 20 MG/100ML; MG/100ML; MG/100ML; MG/100ML
INJECTION, SOLUTION INTRAVENOUS CONTINUOUS
Status: DISCONTINUED | OUTPATIENT
Start: 2018-06-25 | End: 2018-06-27 | Stop reason: HOSPADM

## 2018-06-25 RX ORDER — GLYCOPYRROLATE 1 MG/5 ML
SYRINGE (ML) INTRAVENOUS PRN
Status: DISCONTINUED | OUTPATIENT
Start: 2018-06-25 | End: 2018-06-25 | Stop reason: SDUPTHER

## 2018-06-25 RX ORDER — MIDAZOLAM HYDROCHLORIDE 1 MG/ML
INJECTION INTRAMUSCULAR; INTRAVENOUS PRN
Status: DISCONTINUED | OUTPATIENT
Start: 2018-06-25 | End: 2018-06-25 | Stop reason: SDUPTHER

## 2018-06-25 RX ORDER — DEXAMETHASONE SODIUM PHOSPHATE 10 MG/ML
4 INJECTION INTRAMUSCULAR; INTRAVENOUS
Status: DISCONTINUED | OUTPATIENT
Start: 2018-06-25 | End: 2018-06-25 | Stop reason: HOSPADM

## 2018-06-25 RX ADMIN — MIDAZOLAM HYDROCHLORIDE 2 MG: 1 INJECTION, SOLUTION INTRAMUSCULAR; INTRAVENOUS at 10:50

## 2018-06-25 RX ADMIN — ROCURONIUM BROMIDE 10 MG: 10 INJECTION, SOLUTION INTRAVENOUS at 11:59

## 2018-06-25 RX ADMIN — Medication 0.5 MG: at 13:33

## 2018-06-25 RX ADMIN — HYDROMORPHONE HYDROCHLORIDE 0.5 MG: 2 INJECTION INTRAMUSCULAR; INTRAVENOUS; SUBCUTANEOUS at 15:00

## 2018-06-25 RX ADMIN — ROCURONIUM BROMIDE 10 MG: 10 INJECTION, SOLUTION INTRAVENOUS at 12:47

## 2018-06-25 RX ADMIN — SODIUM CHLORIDE, POTASSIUM CHLORIDE, SODIUM LACTATE AND CALCIUM CHLORIDE: 600; 310; 30; 20 INJECTION, SOLUTION INTRAVENOUS at 09:15

## 2018-06-25 RX ADMIN — FENTANYL CITRATE 50 MCG: 50 INJECTION, SOLUTION INTRAMUSCULAR; INTRAVENOUS at 11:26

## 2018-06-25 RX ADMIN — HYDROMORPHONE HYDROCHLORIDE 0.5 MG: 2 INJECTION INTRAMUSCULAR; INTRAVENOUS; SUBCUTANEOUS at 15:41

## 2018-06-25 RX ADMIN — Medication 4 MG: at 13:33

## 2018-06-25 RX ADMIN — DEXAMETHASONE SODIUM PHOSPHATE 10 MG: 10 INJECTION INTRAMUSCULAR; INTRAVENOUS at 11:16

## 2018-06-25 RX ADMIN — FENTANYL CITRATE 50 MCG: 50 INJECTION, SOLUTION INTRAMUSCULAR; INTRAVENOUS at 14:20

## 2018-06-25 RX ADMIN — OXYCODONE HYDROCHLORIDE AND ACETAMINOPHEN 2 TABLET: 5; 325 TABLET ORAL at 20:46

## 2018-06-25 RX ADMIN — FENTANYL CITRATE 100 MCG: 50 INJECTION, SOLUTION INTRAMUSCULAR; INTRAVENOUS at 10:56

## 2018-06-25 RX ADMIN — DEXTROSE MONOHYDRATE 2 G: 50 INJECTION, SOLUTION INTRAVENOUS at 19:46

## 2018-06-25 RX ADMIN — ONDANSETRON 4 MG: 2 INJECTION, SOLUTION INTRAMUSCULAR; INTRAVENOUS at 10:50

## 2018-06-25 RX ADMIN — Medication 0.1 MG: at 10:50

## 2018-06-25 RX ADMIN — CEFAZOLIN SODIUM 2 G: 2 SOLUTION INTRAVENOUS at 11:22

## 2018-06-25 RX ADMIN — HYDROMORPHONE HYDROCHLORIDE 0.5 MG: 2 INJECTION INTRAMUSCULAR; INTRAVENOUS; SUBCUTANEOUS at 15:14

## 2018-06-25 RX ADMIN — SODIUM CHLORIDE, POTASSIUM CHLORIDE, SODIUM LACTATE AND CALCIUM CHLORIDE: 600; 310; 30; 20 INJECTION, SOLUTION INTRAVENOUS at 13:55

## 2018-06-25 RX ADMIN — SODIUM CHLORIDE, POTASSIUM CHLORIDE, SODIUM LACTATE AND CALCIUM CHLORIDE: 600; 310; 30; 20 INJECTION, SOLUTION INTRAVENOUS at 19:47

## 2018-06-25 RX ADMIN — SODIUM CHLORIDE, POTASSIUM CHLORIDE, SODIUM LACTATE AND CALCIUM CHLORIDE: 600; 310; 30; 20 INJECTION, SOLUTION INTRAVENOUS at 23:27

## 2018-06-25 RX ADMIN — PROPOFOL 180 MG: 10 INJECTION, EMULSION INTRAVENOUS at 10:56

## 2018-06-25 RX ADMIN — ROCURONIUM BROMIDE 50 MG: 10 INJECTION, SOLUTION INTRAVENOUS at 10:56

## 2018-06-25 RX ADMIN — Medication 20 MG: at 12:24

## 2018-06-25 RX ADMIN — LIDOCAINE HYDROCHLORIDE 80 MG: 20 INJECTION, SOLUTION INFILTRATION; PERINEURAL at 10:56

## 2018-06-25 RX ADMIN — FENTANYL CITRATE 50 MCG: 50 INJECTION, SOLUTION INTRAMUSCULAR; INTRAVENOUS at 12:03

## 2018-06-25 RX ADMIN — SODIUM CHLORIDE, POTASSIUM CHLORIDE, SODIUM LACTATE AND CALCIUM CHLORIDE: 600; 310; 30; 20 INJECTION, SOLUTION INTRAVENOUS at 10:50

## 2018-06-25 RX ADMIN — Medication 40 MG: at 11:16

## 2018-06-25 RX ADMIN — Medication 20 MG: at 11:47

## 2018-06-25 RX ADMIN — GABAPENTIN 600 MG: 300 CAPSULE ORAL at 20:46

## 2018-06-25 RX ADMIN — ONDANSETRON 4 MG: 2 INJECTION INTRAMUSCULAR; INTRAVENOUS at 16:12

## 2018-06-25 ASSESSMENT — PULMONARY FUNCTION TESTS
PIF_VALUE: 3
PIF_VALUE: 22
PIF_VALUE: 16
PIF_VALUE: 19
PIF_VALUE: 22
PIF_VALUE: 21
PIF_VALUE: 15
PIF_VALUE: 16
PIF_VALUE: 23
PIF_VALUE: 22
PIF_VALUE: 1
PIF_VALUE: 22
PIF_VALUE: 16
PIF_VALUE: 23
PIF_VALUE: 20
PIF_VALUE: 22
PIF_VALUE: 24
PIF_VALUE: 24
PIF_VALUE: 16
PIF_VALUE: 3
PIF_VALUE: 22
PIF_VALUE: 16
PIF_VALUE: 23
PIF_VALUE: 24
PIF_VALUE: 20
PIF_VALUE: 22
PIF_VALUE: 22
PIF_VALUE: 3
PIF_VALUE: 2
PIF_VALUE: 22
PIF_VALUE: 2
PIF_VALUE: 3
PIF_VALUE: 23
PIF_VALUE: 16
PIF_VALUE: 21
PIF_VALUE: 16
PIF_VALUE: 22
PIF_VALUE: 22
PIF_VALUE: 21
PIF_VALUE: 16
PIF_VALUE: 22
PIF_VALUE: 15
PIF_VALUE: 2
PIF_VALUE: 22
PIF_VALUE: 21
PIF_VALUE: 22
PIF_VALUE: 22
PIF_VALUE: 23
PIF_VALUE: 21
PIF_VALUE: 16
PIF_VALUE: 23
PIF_VALUE: 22
PIF_VALUE: 22
PIF_VALUE: 20
PIF_VALUE: 21
PIF_VALUE: 2
PIF_VALUE: 23
PIF_VALUE: 4
PIF_VALUE: 22
PIF_VALUE: 22
PIF_VALUE: 23
PIF_VALUE: 2
PIF_VALUE: 22
PIF_VALUE: 1
PIF_VALUE: 22
PIF_VALUE: 19
PIF_VALUE: 23
PIF_VALUE: 19
PIF_VALUE: 22
PIF_VALUE: 19
PIF_VALUE: 24
PIF_VALUE: 21
PIF_VALUE: 1
PIF_VALUE: 2
PIF_VALUE: 1
PIF_VALUE: 19
PIF_VALUE: 2
PIF_VALUE: 22
PIF_VALUE: 22
PIF_VALUE: 19
PIF_VALUE: 22
PIF_VALUE: 22
PIF_VALUE: 2
PIF_VALUE: 22
PIF_VALUE: 23
PIF_VALUE: 24
PIF_VALUE: 23
PIF_VALUE: 24
PIF_VALUE: 23
PIF_VALUE: 22
PIF_VALUE: 23
PIF_VALUE: 21
PIF_VALUE: 19
PIF_VALUE: 23
PIF_VALUE: 21
PIF_VALUE: 21
PIF_VALUE: 24
PIF_VALUE: 22
PIF_VALUE: 22
PIF_VALUE: 2
PIF_VALUE: 22
PIF_VALUE: 23
PIF_VALUE: 22
PIF_VALUE: 15
PIF_VALUE: 2
PIF_VALUE: 22
PIF_VALUE: 23
PIF_VALUE: 0
PIF_VALUE: 24
PIF_VALUE: 21
PIF_VALUE: 22
PIF_VALUE: 23
PIF_VALUE: 22
PIF_VALUE: 22
PIF_VALUE: 3
PIF_VALUE: 24
PIF_VALUE: 19
PIF_VALUE: 23
PIF_VALUE: 23
PIF_VALUE: 2
PIF_VALUE: 24
PIF_VALUE: 22
PIF_VALUE: 21
PIF_VALUE: 24
PIF_VALUE: 21
PIF_VALUE: 23
PIF_VALUE: 24
PIF_VALUE: 16
PIF_VALUE: 22
PIF_VALUE: 21
PIF_VALUE: 22
PIF_VALUE: 21
PIF_VALUE: 19
PIF_VALUE: 2
PIF_VALUE: 22
PIF_VALUE: 21
PIF_VALUE: 21
PIF_VALUE: 23
PIF_VALUE: 7
PIF_VALUE: 21
PIF_VALUE: 23
PIF_VALUE: 24
PIF_VALUE: 1
PIF_VALUE: 24
PIF_VALUE: 2
PIF_VALUE: 22
PIF_VALUE: 21
PIF_VALUE: 22
PIF_VALUE: 25
PIF_VALUE: 22
PIF_VALUE: 22
PIF_VALUE: 1
PIF_VALUE: 22
PIF_VALUE: 22
PIF_VALUE: 21
PIF_VALUE: 24
PIF_VALUE: 23
PIF_VALUE: 18
PIF_VALUE: 24
PIF_VALUE: 21
PIF_VALUE: 25
PIF_VALUE: 22
PIF_VALUE: 19
PIF_VALUE: 16
PIF_VALUE: 16
PIF_VALUE: 22
PIF_VALUE: 24
PIF_VALUE: 16
PIF_VALUE: 24
PIF_VALUE: 24
PIF_VALUE: 22
PIF_VALUE: 23
PIF_VALUE: 15
PIF_VALUE: 22
PIF_VALUE: 22
PIF_VALUE: 16
PIF_VALUE: 23
PIF_VALUE: 18
PIF_VALUE: 18
PIF_VALUE: 22
PIF_VALUE: 23
PIF_VALUE: 22
PIF_VALUE: 23
PIF_VALUE: 2
PIF_VALUE: 21
PIF_VALUE: 21
PIF_VALUE: 22
PIF_VALUE: 23
PIF_VALUE: 21
PIF_VALUE: 21
PIF_VALUE: 22
PIF_VALUE: 21
PIF_VALUE: 19

## 2018-06-25 ASSESSMENT — PAIN SCALES - GENERAL
PAINLEVEL_OUTOF10: 7
PAINLEVEL_OUTOF10: 6
PAINLEVEL_OUTOF10: 5
PAINLEVEL_OUTOF10: 7
PAINLEVEL_OUTOF10: 6
PAINLEVEL_OUTOF10: 6
PAINLEVEL_OUTOF10: 7
PAINLEVEL_OUTOF10: 7
PAINLEVEL_OUTOF10: 3
PAINLEVEL_OUTOF10: 6

## 2018-06-25 ASSESSMENT — PAIN DESCRIPTION - LOCATION
LOCATION: BACK
LOCATION: BACK

## 2018-06-25 ASSESSMENT — PAIN DESCRIPTION - PROGRESSION
CLINICAL_PROGRESSION: NOT CHANGED
CLINICAL_PROGRESSION: NOT CHANGED

## 2018-06-25 ASSESSMENT — PAIN DESCRIPTION - PAIN TYPE
TYPE: SURGICAL PAIN;ACUTE PAIN
TYPE: ACUTE PAIN;SURGICAL PAIN

## 2018-06-25 ASSESSMENT — PAIN - FUNCTIONAL ASSESSMENT: PAIN_FUNCTIONAL_ASSESSMENT: 0-10

## 2018-06-25 ASSESSMENT — PAIN DESCRIPTION - DESCRIPTORS
DESCRIPTORS: ACHING;DISCOMFORT
DESCRIPTORS: ACHING;DISCOMFORT

## 2018-06-25 ASSESSMENT — PAIN DESCRIPTION - ONSET
ONSET: ON-GOING
ONSET: ON-GOING

## 2018-06-25 ASSESSMENT — PAIN DESCRIPTION - FREQUENCY
FREQUENCY: CONTINUOUS
FREQUENCY: CONTINUOUS

## 2018-06-25 ASSESSMENT — PAIN DESCRIPTION - ORIENTATION
ORIENTATION: LOWER;MID
ORIENTATION: LOWER;MID

## 2018-06-25 NOTE — H&P
Negative for chest pain, irregular heart beat, and palpitations. GASTROINTESTINAL: Occasional GERD. Negative for nausea, vomiting, diarrhea, constipation, change in bowel habits, and abdominal pain. GENITOURINARY: Frequency. Urge incontinence. Overactive bladder. Negative for difficulty of urination, burning with urination, hematuria, and UTI. INTEGUMENT:  Negative for rash, skin lesions, and easy bruising. HEMATOLOGIC/LYMPHATIC:  Bilateral ankle swelling. ALLERGIC/IMMUNOLOGIC:  Negative for urticaria and itching. ENDOCRINE:  Negative for diabetes, increase in drinking, increase in urination, and heat or cold intolerance. MUSCULOSKELETAL: Bilateral ankle swelling. Back pain. Left foot pain. Negative muscle aches. NEUROLOGICAL: Sciatica. Negative for headaches, dizziness, and lightheadedness. BEHAVIOR/PSYCH: Treated anxiety, depression, and ADD. Denies suicidal ideations.     Physical Exam:   /77   Pulse 99   Resp 18   Ht 5' 7\" (1.702 m)   Wt 211 lb 6.7 oz (95.9 kg)   LMP 04/14/2018   SpO2 100%   BMI 33.11 kg/m²   Patient's last menstrual period was 04/14/2018. [id number]  No results for input(s): POCGLU in the last 72 hours.      General Appearance:  Alert, well appearing, and in no acute distress. Obese. Mental status:  Oriented to person, place, and time. Head:  Normocephalic and atraumatic. Eye:  No icterus, redness, pupils equal and reactive, extraocular eye movements intact, and conjunctiva clear. Ear:  Hearing grossly intact. Nose:  No drainage noted. Mouth:  Airway is patent. Mucous membranes moist.  Neck:  Supple and no carotid bruits noted. Lungs:  Bilateral equal air entry, clear to ausculation, no wheezing, rales or rhonchi, and normal effort. Cardiovascular:  Normal rate, regular rhythm, no murmur, gallop, and rub. Abdomen: Rounded abdomen. Soft, nontender, nondistended, and active bowel sounds. Neurologic:  Normal speech and cranial nerves II through XII grossly intact. Strength 5+/5+ bilaterally. Skin:  No gross lesions, rashes, bruising, or bleeding on exposed skin area. Extremities: Bilateral 2+ ankle edema- Follows up with Dr. Levon Varela (Vascular Surgery). Posterior tibial pulses 2+ bilaterally. No calf tenderness with palpation. Psych:  Normal affect.      Investigations:       Laboratory Testing:  Recent Results   No results found for this or any previous visit (from the past 24 hour(s)).            Recent Labs      18   0908   HGB  13.6   HCT  43.2   WBC  7.0   MCV  95.8   NA  142   K  5.0   CL  104   CO2  23   BUN  17   CREATININE  0.59   GLUCOSE  84   AST  23   ALT  42*   LABALBU  4.3         Imaging/Diagnostics:     CT Lumbar Spine With Contrast 2018     Impression   Postsurgical changes compatible with posterior lumbar fusion and laminectomy   at L5-S1.  No residual or recurrent focal disc protrusion is identified at   this level.       Stable moderate left foraminal stenosis and mild right foraminal stenosis at   L5-S1.       New small left paracentral disc protrusion at L4-L5 likely impinging upon the   left L5 nerve root within the lateral recess.                [id number].   See paper chart     Diagnosis:       1. Spondylolisthesis     Plans:      1. Lumbar interbody fusion posterior L4-5      SHREE Kohli CNP  2018  9:03 AM             Pre-Admission Testing Visit on 2018            Routing History            Detailed Report        H&P Info     Author Note Status Last Update User   SHREE Kohli CNP Cosign Needed SHREE Kohli CNP   Last Update Date/Time: 2018  9:20 AM   Chart Review Routing History     Recipient Method Report Sent By   Hremilo Diane PA-C   Phone: [phone]    In [address] Routed Notes SHREE Kohli CNP [[id number]]   Sent: 2018  9:20 AM   Filed: 2018

## 2018-06-25 NOTE — BRIEF OP NOTE
Brief Postoperative Note  ______________________________________________________________    Patient: Josy Varela  YOB: 1980  MRN: [id number]  Date of Procedure: 6/25/2018    Pre-Op Diagnosis: DX SPONDYLOLITHESIS    Post-Op Diagnosis: Same       Procedure(s):  LUMBAR INTERBODY FUSION POSTERIOR L45    Anesthesia: General    Surgeon(s):  Bertram Wasserman MD    Staff:  Surgical Assistant: Huey Chaney  Scrub Person First: Gwendolyn Cancino  Scrub Person Second: Osiel Johnston     Estimated Blood Loss: 300 (units unknown) mL    Complications: None    Specimens:   * No specimens in log *    Implants:    Implant Name Type Inv.  Item Serial No.  Lot No. LRB No. Used   KIT SEALANT SURGIFLO HEMOSTATIC MATRIX Bone/Graft/Tissue KIT SEALANT SURGIFLO HEMOSTATIC MATRIX  JNJ: [employer] [id number] N/A 1   STRIP OSTEOMATRIX Bone/Graft/Tissue STRIP OSTEOMATRIX  BIOVENTUS [id number] N/A 1   IMPL SPACER SPINE STR [id number] Spine IMPL SPACER SPINE STR [id number]  NAGI SPINE [id number] N/A 2   SET SCREW BONE SPINE STD GEN 2 TITANIUM Spine SET SCREW BONE SPINE STD GEN 2 TITANIUM  BIOMET INC  N/A 6   SCREW 6.5X40MM Spine SCREW 6.5X40MM  NAGI INC  N/A 2   SCREW POLYAXIAL CAN 7.5MM ELLE 40MM JESUS Spine SCREW POLYAXIAL CAN 7.5MM ELLE 40MM JESUS  BI[employer]ET INC  N/A 1   SCREW POLYAXIAL CAN 7.5MM ELLE 45MM JESUS Spine SCREW POLYAXIAL CAN 7.5MM ELLE 45MM JESUS  BI[employer]ET INC  N/A 1   IMPL SPINE ISA CRV 60MM Spine IMPL SPINE ISA CRV 60MM  NAGI INC  N/A 2   CONNECTOR CROSS RENNY 48 TO 60 Spine CONNECTOR CROSS RENNY 48 TO 60   NAGI INC   N/A 1         Drains:   Closed/Suction Drain Left Back Bulb 10 Indian (Active)       Urethral Catheter Double-lumen 16 fr (Active)       Findings: Epidural fibrosis and recurrent HNP    Bertram Wasserman MD  Date: 6/25/2018  Time: 1:48 PM

## 2018-06-26 LAB
GLUCOSE BLD-MCNC: 124 MG/DL (ref 65–105)
GLUCOSE BLD-MCNC: 129 MG/DL (ref 65–105)
GLUCOSE BLD-MCNC: 141 MG/DL (ref 65–105)
GLUCOSE BLD-MCNC: 146 MG/DL (ref 65–105)

## 2018-06-26 PROCEDURE — 2580000003 HC RX 258: Performed by: NEUROLOGICAL SURGERY

## 2018-06-26 PROCEDURE — 1200000000 HC SEMI PRIVATE

## 2018-06-26 PROCEDURE — 6370000000 HC RX 637 (ALT 250 FOR IP): Performed by: NEUROLOGICAL SURGERY

## 2018-06-26 PROCEDURE — 82947 ASSAY GLUCOSE BLOOD QUANT: CPT

## 2018-06-26 PROCEDURE — 6360000002 HC RX W HCPCS: Performed by: NEUROLOGICAL SURGERY

## 2018-06-26 RX ADMIN — OXYCODONE HYDROCHLORIDE AND ACETAMINOPHEN 2 TABLET: 5; 325 TABLET ORAL at 06:01

## 2018-06-26 RX ADMIN — GABAPENTIN 600 MG: 300 CAPSULE ORAL at 21:09

## 2018-06-26 RX ADMIN — OXYCODONE HYDROCHLORIDE AND ACETAMINOPHEN 2 TABLET: 5; 325 TABLET ORAL at 23:51

## 2018-06-26 RX ADMIN — Medication 10 ML: at 21:09

## 2018-06-26 RX ADMIN — Medication 10 ML: at 09:41

## 2018-06-26 RX ADMIN — OXYCODONE HYDROCHLORIDE AND ACETAMINOPHEN 2 TABLET: 5; 325 TABLET ORAL at 01:59

## 2018-06-26 RX ADMIN — GABAPENTIN 600 MG: 300 CAPSULE ORAL at 09:40

## 2018-06-26 RX ADMIN — GABAPENTIN 600 MG: 300 CAPSULE ORAL at 14:17

## 2018-06-26 RX ADMIN — OXYCODONE HYDROCHLORIDE AND ACETAMINOPHEN 2 TABLET: 5; 325 TABLET ORAL at 14:17

## 2018-06-26 RX ADMIN — SODIUM CHLORIDE, POTASSIUM CHLORIDE, SODIUM LACTATE AND CALCIUM CHLORIDE: 600; 310; 30; 20 INJECTION, SOLUTION INTRAVENOUS at 05:55

## 2018-06-26 RX ADMIN — DEXTROSE MONOHYDRATE 2 G: 50 INJECTION, SOLUTION INTRAVENOUS at 01:59

## 2018-06-26 RX ADMIN — OXYCODONE HYDROCHLORIDE AND ACETAMINOPHEN 2 TABLET: 5; 325 TABLET ORAL at 10:25

## 2018-06-26 RX ADMIN — DOCUSATE SODIUM 100 MG: 100 CAPSULE, LIQUID FILLED ORAL at 09:40

## 2018-06-26 RX ADMIN — OXYCODONE HYDROCHLORIDE AND ACETAMINOPHEN 2 TABLET: 5; 325 TABLET ORAL at 18:18

## 2018-06-26 ASSESSMENT — PAIN DESCRIPTION - PAIN TYPE
TYPE: SURGICAL PAIN

## 2018-06-26 ASSESSMENT — PAIN DESCRIPTION - LOCATION
LOCATION: BACK

## 2018-06-26 ASSESSMENT — PAIN DESCRIPTION - ORIENTATION
ORIENTATION: LOWER
ORIENTATION: LOWER
ORIENTATION: LOWER;MID
ORIENTATION: LOWER;MID

## 2018-06-26 ASSESSMENT — PAIN DESCRIPTION - FREQUENCY
FREQUENCY: CONTINUOUS

## 2018-06-26 ASSESSMENT — PAIN DESCRIPTION - DESCRIPTORS
DESCRIPTORS: CONSTANT;DISCOMFORT;DULL
DESCRIPTORS: CONSTANT;DISCOMFORT
DESCRIPTORS: DISCOMFORT
DESCRIPTORS: DISCOMFORT

## 2018-06-26 ASSESSMENT — PAIN SCALES - GENERAL
PAINLEVEL_OUTOF10: 0
PAINLEVEL_OUTOF10: 8
PAINLEVEL_OUTOF10: 8
PAINLEVEL_OUTOF10: 3
PAINLEVEL_OUTOF10: 6
PAINLEVEL_OUTOF10: 7
PAINLEVEL_OUTOF10: 3
PAINLEVEL_OUTOF10: 8

## 2018-06-26 ASSESSMENT — PAIN DESCRIPTION - PROGRESSION
CLINICAL_PROGRESSION: GRADUALLY IMPROVING
CLINICAL_PROGRESSION: GRADUALLY IMPROVING

## 2018-06-26 ASSESSMENT — PAIN DESCRIPTION - ONSET
ONSET: ON-GOING

## 2018-06-26 NOTE — PLAN OF CARE
Problem: Falls - Risk of:  Goal: Will remain free from falls  Will remain free from falls   Outcome: Ongoing  Remains alert and oriented Using call light appropriately Up per self with steady gait    Problem:  Activity Intolerance:  Goal: Ability to tolerate increased activity will improve  Ability to tolerate increased activity will improve   Outcome: Ongoing  Ambulating in room per self with steady gait    Problem: Pain:  Goal: Control of acute pain  Control of acute pain  Outcome: Ongoing  Taking percocet every 4 hours for pain control

## 2018-06-26 NOTE — CARE COORDINATION
Case Management Initial Discharge Plan  Angela Abad,         Readmission Risk              Risk of Unplanned Readmission:        7               Met with:patient or spouse/SO to discuss discharge plans. Information verified: address, contacts, phone number, , insurance Yes  PCP: Jennifer Brannon PA-C  Date of last visit:     Insurance Provider: SELMA    Discharge Planning  Current Residence:  house  Living Arrangements:  Spouse/Significant Other, Children   Home has 1 floor stories/5 stairs to climb  Support Systems:  Spouse/Significant Other  Current Services PTA:   none Agency:  none  Patient able to perform ADL's:Assisted  DME in home:  walker  DME used to aid ambulation prior to admission:    none  DME used during admission:  walker    Potential Assistance Needed:  N/A    Pharmacy: [facility] Medications:  No  Does patient want to participate in local refill/ meds to beds program?  N/A    Patient agreeable to home care: No  [place] of choice provided:  n/a      Type of Home Care Services:  None  Patient expects to be discharged to:  home    Prior SNF/Rehab Placement and Facility:  none  Agreeable to SNF/Rehab: No  [place] of choice provided: n/a   Evaluation: no    Expected Discharge date:  18  Follow Up Appointment: Best Day/ Time:      Transportation provider: self  Transportation arrangements needed for discharge: No    Discharge Plan: Met with pt and spouse at bedside. Pt lives with spouse and children and was independent at home. Pt is getting walker from family member. Plan discharge tomorrow to home. No dc needs. F/U appt 7/10.         Electronically signed by Dominga Donovan RN on 18 at 2:28 PM

## 2018-06-26 NOTE — DISCHARGE INSTR - DIET

## 2018-06-26 NOTE — OP NOTE
[id number] [facility] [address]                 [address] [place] [address]                                 OPERATIVE REPORT    PATIENT NAME: Bryant Clement                     :        1980  MED REC NO:   [id number]                             ROOM:       2017  ACCOUNT NO:   [de-identified]                           ADMIT DATE: 2018  PROVIDER:     Florette Blend    DATE OF PROCEDURE:  2018    PREOPERATIVE DIAGNOSIS:  Recurrent disk herniation, L4-L5. POSTOPERATIVE DIAGNOSIS:  Recurrent disk herniation, L4-L5. PROCEDURES PERFORMED:  1. Combined interbody and posterolateral fusion, L4-L5. 2.  Pedicle screw fixation, L4 through S1.  3. L4-L5 Decker procedure. 4.  Application of interbody fusion device, L4-L5. 5.  Removal of old hardware. 6.  Intraoperative autograft from same incision. 7.  Use of allograft. 8.  Microdissection. SURGEON:  Dr. Guillaume Garcia. [facility]    INDICATION FOR SURGERY:  Recurrent disk herniation. DESCRIPTION OF OPERATION:  After adequate level of general endotracheal  anesthesia had been obtained, the patient was turned prone on the [facility]  table, prepared and draped in usual sterile fashion. An incision was made  over spinous processes of L4 and left of L5, and carried down to the lumbar  fascia, which was then opened. The paraspinous muscles were  subperiosteally elevated bilaterally. The old hardware was identified and  removed. Attention was then turned towards completing the Decker procedure. The spinous process and lamina at the L4 was removed. This was carried  laterally and the pars was removed bilaterally, thus completing the Decker  procedure. On the left side, there was a fair amount of epidural fibrosis  and I brought the operative microscope in to dissect that off of the nerve  root and generous foraminotomy was done over the L5 nerve root. Once this  was done, attention was turned to the fusion.   Under

## 2018-06-27 VITALS
SYSTOLIC BLOOD PRESSURE: 116 MMHG | BODY MASS INDEX: 33.12 KG/M2 | TEMPERATURE: 98.2 F | HEIGHT: 67 IN | WEIGHT: 211 LBS | OXYGEN SATURATION: 100 % | HEART RATE: 72 BPM | RESPIRATION RATE: 20 BRPM | DIASTOLIC BLOOD PRESSURE: 63 MMHG

## 2018-06-27 LAB — GLUCOSE BLD-MCNC: 87 MG/DL (ref 65–105)

## 2018-06-27 PROCEDURE — 6370000000 HC RX 637 (ALT 250 FOR IP): Performed by: NEUROLOGICAL SURGERY

## 2018-06-27 PROCEDURE — 82947 ASSAY GLUCOSE BLOOD QUANT: CPT

## 2018-06-27 RX ORDER — OXYCODONE HYDROCHLORIDE AND ACETAMINOPHEN 5; 325 MG/1; MG/1
1 TABLET ORAL EVERY 4 HOURS PRN
Qty: 30 TABLET | Refills: 0 | Status: SHIPPED | OUTPATIENT
Start: 2018-06-27 | End: 2018-07-04

## 2018-06-27 RX ADMIN — OXYCODONE HYDROCHLORIDE AND ACETAMINOPHEN 2 TABLET: 5; 325 TABLET ORAL at 08:11

## 2018-06-27 RX ADMIN — GABAPENTIN 600 MG: 300 CAPSULE ORAL at 08:11

## 2018-06-27 RX ADMIN — OXYCODONE HYDROCHLORIDE AND ACETAMINOPHEN 2 TABLET: 5; 325 TABLET ORAL at 03:51

## 2018-06-27 RX ADMIN — DOCUSATE SODIUM 100 MG: 100 CAPSULE, LIQUID FILLED ORAL at 08:11

## 2018-06-27 ASSESSMENT — PAIN SCALES - GENERAL
PAINLEVEL_OUTOF10: 3
PAINLEVEL_OUTOF10: 6
PAINLEVEL_OUTOF10: 7

## 2018-06-27 ASSESSMENT — PAIN DESCRIPTION - LOCATION: LOCATION: BACK

## 2018-06-27 ASSESSMENT — PAIN DESCRIPTION - PAIN TYPE: TYPE: SURGICAL PAIN

## 2018-06-27 ASSESSMENT — PAIN DESCRIPTION - ORIENTATION: ORIENTATION: LOWER

## 2018-06-27 ASSESSMENT — PAIN DESCRIPTION - FREQUENCY: FREQUENCY: CONTINUOUS

## 2018-06-27 ASSESSMENT — PAIN DESCRIPTION - ONSET: ONSET: ON-GOING

## 2018-06-27 ASSESSMENT — PAIN DESCRIPTION - DESCRIPTORS: DESCRIPTORS: DISCOMFORT

## 2018-06-27 ASSESSMENT — PAIN DESCRIPTION - PROGRESSION: CLINICAL_PROGRESSION: GRADUALLY IMPROVING

## 2018-06-27 NOTE — PLAN OF CARE
Problem: Falls - Risk of:  Goal: Will remain free from falls  Will remain free from falls   Outcome: Ongoing  Remains alert and oriented Gait steady when up per self    Problem: Pain:  Goal: Pain level will decrease  Pain level will decrease   Outcome: Ongoing  Taking percocet every 4 hours for pain control

## 2018-06-27 NOTE — PLAN OF CARE
Problem: Falls - Risk of:  Goal: Will remain free from falls  Will remain free from falls   Outcome: Ongoing  Independent.  Walked in the hallway    Problem: Pain:  Goal: Control of acute pain  Control of acute pain   Outcome: Ongoing  Informed the due time of pain medication due, instructed to call when in need

## 2018-07-02 ENCOUNTER — OFFICE VISIT (OUTPATIENT)
Dept: FAMILY MEDICINE CLINIC | Age: 38
End: 2018-07-02
Payer: COMMERCIAL

## 2018-07-02 VITALS
WEIGHT: 220 LBS | SYSTOLIC BLOOD PRESSURE: 132 MMHG | RESPIRATION RATE: 18 BRPM | DIASTOLIC BLOOD PRESSURE: 76 MMHG | BODY MASS INDEX: 34.53 KG/M2 | OXYGEN SATURATION: 96 % | HEART RATE: 95 BPM | HEIGHT: 67 IN | TEMPERATURE: 98.6 F

## 2018-07-02 DIAGNOSIS — F98.8 ATTENTION DEFICIT DISORDER, UNSPECIFIED HYPERACTIVITY PRESENCE: ICD-10-CM

## 2018-07-02 DIAGNOSIS — L30.9 DERMATITIS: ICD-10-CM

## 2018-07-02 DIAGNOSIS — F41.9 ANXIETY: Primary | ICD-10-CM

## 2018-07-02 DIAGNOSIS — M54.50 CHRONIC MIDLINE LOW BACK PAIN WITHOUT SCIATICA: ICD-10-CM

## 2018-07-02 DIAGNOSIS — G89.29 CHRONIC MIDLINE LOW BACK PAIN WITHOUT SCIATICA: ICD-10-CM

## 2018-07-02 PROCEDURE — 99213 OFFICE O/P EST LOW 20 MIN: CPT | Performed by: PHYSICIAN ASSISTANT

## 2018-07-02 PROCEDURE — 1036F TOBACCO NON-USER: CPT | Performed by: PHYSICIAN ASSISTANT

## 2018-07-02 PROCEDURE — G8427 DOCREV CUR MEDS BY ELIG CLIN: HCPCS | Performed by: PHYSICIAN ASSISTANT

## 2018-07-02 PROCEDURE — G8417 CALC BMI ABV UP PARAM F/U: HCPCS | Performed by: PHYSICIAN ASSISTANT

## 2018-07-02 PROCEDURE — 1111F DSCHRG MED/CURRENT MED MERGE: CPT | Performed by: PHYSICIAN ASSISTANT

## 2018-07-02 RX ORDER — IBUPROFEN 800 MG/1
TABLET ORAL
Refills: 1 | COMMUNITY
Start: 2018-06-12 | End: 2019-07-09 | Stop reason: SDUPTHER

## 2018-07-02 RX ORDER — ATOMOXETINE 80 MG/1
80 CAPSULE ORAL DAILY
Qty: 30 CAPSULE | Refills: 3 | Status: SHIPPED | OUTPATIENT
Start: 2018-07-02 | End: 2018-08-28 | Stop reason: ALTCHOICE

## 2018-07-02 ASSESSMENT — ENCOUNTER SYMPTOMS
COLOR CHANGE: 0
BACK PAIN: 1

## 2018-07-02 NOTE — PROGRESS NOTES
needed      vitamin B-12 (CYANOCOBALAMIN) 1000 MCG tablet Take 1,000 mcg by mouth daily      Black Cohosh 540 MG CAPS Take 1 capsule by mouth 2 times daily      Multiple Vitamins-Minerals (THERAPEUTIC MULTIVITAMIN-MINERALS) tablet Take 1 tablet by mouth daily      fluticasone (FLONASE) 50 MCG/ACT nasal spray 2 sprays by Nasal route 2 times daily      diphenhydrAMINE (BENADRYL) 25 MG tablet Take 25 mg by mouth every 6 hours as needed for Itching      Caffeine-Magnesium Salicylate (DIUREX PO) Take 1 tablet by mouth daily      triamcinolone (KENALOG) 0.1 % cream Apply topically 2 times daily. 60 g 0    gabapentin (NEURONTIN) 600 MG tablet Take 600 mg by mouth 3 times daily.  trospium (SANCTURA) 20 MG tablet TAKE 1 TABLET TWICE A  tablet 3    zolpidem (AMBIEN CR) 12.5 MG extended release tablet TAKE 1 TABLET BY MOUTH NIGHTLY AS NEEDED FOR SLEEP  0    Sennosides-Docusate Sodium (STOOL SOFTENER LAXATIVE PO) Take 1 tablet by mouth 2 times daily as needed       Lactobacillus (PROBIOTIC ACIDOPHILUS PO) Take 1 tablet by mouth daily      Elastic Bandages & Supports (MEDICAL COMPRESSION STOCKINGS) MISC Knee high 20 -30 mm hg 1 each 2     No current facility-administered medications for this visit. No Known Allergies    Health Maintenance   Topic Date Due    DTaP/Tdap/Td vaccine (1 - Tdap) 03/22/2000    Flu vaccine (1) 10/01/2018 (Originally 9/1/2018)    Creatinine monitoring  05/18/2019    Potassium monitoring  06/05/2019    Cervical cancer screen  10/11/2022    HIV screen  Completed       Subjective:      Review of Systems   Constitutional: Negative for activity change, appetite change, fatigue, fever and unexpected weight change. Musculoskeletal: Positive for back pain. Negative for arthralgias, gait problem and joint swelling. Skin: Positive for rash. Negative for color change, pallor and wound. Neurological: Negative for weakness and numbness.    Hematological: Negative for Number of Visits Requested:   1     Orders Placed This Encounter   Medications    atomoxetine (STRATTERA) 80 MG capsule     Sig: Take 1 capsule by mouth daily     Dispense:  30 capsule     Refill:  3       Patient given educational materials - see patient instructions. Discussed use, benefit, and side effects of prescribed medications. All patient questions answered. Pt voiced understanding. Reviewed health maintenance. Instructed to continue current medications, diet and exercise. Patient agreed with treatment plan. Follow up as directed.      Electronically signed by Wayne Rm PA-C on 7/2/2018 at 2:27 PM

## 2018-07-06 ENCOUNTER — TELEPHONE (OUTPATIENT)
Dept: FAMILY MEDICINE CLINIC | Age: 38
End: 2018-07-06

## 2018-07-06 NOTE — TELEPHONE ENCOUNTER
Patient called stating that FMLA needs corrected - Part A question 8 - initialed and date.  Please advise, thank you

## 2018-07-09 ENCOUNTER — PATIENT MESSAGE (OUTPATIENT)
Dept: FAMILY MEDICINE CLINIC | Age: 38
End: 2018-07-09

## 2018-07-10 ENCOUNTER — PATIENT MESSAGE (OUTPATIENT)
Dept: FAMILY MEDICINE CLINIC | Age: 38
End: 2018-07-10

## 2018-07-10 NOTE — DISCHARGE SUMMARY
Physician Discharge Summary     Patient ID:  Roman De Los Santos  [id number]  56 y.o.  1980    Admit date: 6/25/2018    Discharge date and time: 6/27/2018 10:58 AM     Admitting Physician: James Hdez MD     Discharge Physician: James Hdez    Admission Diagnoses: Degenerative disc disease, lumbar [M51.36]    Discharge Diagnoses: Lumbar radiculopathy secondary to recurrent disc disease and degenerative disc disease L5-S1good    Admission Condition: good    Discharged Condition: good    Indication for Admission: Back and radicular leg pain recurrent disc as of the recurrent this was elected to lumbar fusion    Hospital Course: Postoperatively she did well her leg pain improved significantly she is discharged home to wear brace 1 pop in the office in 2 weeks medications as noted on her med rec    Consults: none    Significant Diagnostic Studies: None    Treatments: surgery: L5-S1 PLIF    Discharge Exam:  Neurologically intact    Disposition: home    In process/preliminary results:  Outstanding Order Results     No orders found from 5/27/2018 to 6/26/2018. Patient Instructions:   Discharge Medication List as of 6/27/2018 10:22 AM      CONTINUE these medications which have CHANGED    Details   oxyCODONE-acetaminophen (PERCOCET) 5-325 MG per tablet Take 1 tablet by mouth every 4 hours as needed for Pain for up to 7 days. ., Disp-30 tablet, R-0Print         CONTINUE these medications which have NOT CHANGED    Details   tiZANidine (ZANAFLEX) 2 MG tablet Take 2 mg by mouth every 8 hours as neededHistorical Med      vitamin B-12 (CYANOCOBALAMIN) 1000 MCG tablet Take 1,000 mcg by mouth dailyHistorical Med      Black Cohosh 540 MG CAPS Take 1 capsule by mouth 2 times dailyHistorical Med      Multiple Vitamins-Minerals (THERAPEUTIC MULTIVITAMIN-MINERALS) tablet Take 1 tablet by mouth dailyHistorical Med      fluticasone (FLONASE) 50 MCG/ACT nasal spray 2 sprays by Nasal route 2 times dailyHistorical Med diphenhydrAMINE (BENADRYL) 25 MG tablet Take 25 mg by mouth every 6 hours as needed for ItchingHistorical Med      Caffeine-Magnesium Salicylate (DIUREX PO) Take 1 tablet by mouth dailyHistorical Med      Elastic Bandages & Supports (MEDICAL COMPRESSION STOCKINGS) MISC Disp-1 each, R-2, PrintKnee high 20 -30 mm hg      triamcinolone (KENALOG) 0.1 % cream Apply topically 2 times daily. , Disp-60 g, R-0, Normal      atomoxetine (STRATTERA) 80 MG capsule Take 1 capsule by mouth daily, Disp-30 capsule, R-3Normal      gabapentin (NEURONTIN) 600 MG tablet Take 600 mg by mouth 3 times daily. Historical Med      trospium (SANCTURA) 20 MG tablet TAKE 1 TABLET TWICE A DAY, Disp-180 tablet, R-3Normal      zolpidem (AMBIEN CR) 12.5 MG extended release tablet TAKE 1 TABLET BY MOUTH NIGHTLY AS NEEDED FOR SLEEP, R-0Historical Med      ibuprofen (ADVIL;MOTRIN) 600 MG tablet TAKE 1 TABLET EVERY 8 HOURSAS NEEDED FOR PAIN, Disp-90 tablet, R-0Normal      Sennosides-Docusate Sodium (STOOL SOFTENER LAXATIVE PO) Take 1 tablet by mouth 2 times daily as needed Historical Med      Lactobacillus (PROBIOTIC ACIDOPHILUS PO) Take 1 tablet by mouth daily           Activity: activity as tolerated  Diet: regular diet  Wound Care: keep wound clean and dry    Follow-up with Meenu in 2 weeks.     Luis Carlos Chua  7/10/2018  3:54 PM

## 2018-07-10 NOTE — TELEPHONE ENCOUNTER
From: Roman De Los Santos  To: Karen Mckenzie PA-C  Sent: 7/10/2018 10:01 AM EDT  Subject: RE: Visit Follow-Up Question    I just spoke with Rajeev Barnard, they said question 9 also has to list specific job functions I cannot do. They said stuff like standing, bending, lifting would work. Then you have to initial and date that as well. Thank you and sorry about all my emails bugging you.     ----- Message -----  From: Robert Bearden PA-C  Sent: 7/10/18 9:30 AM  To: Roman De Los Santos  Subject: RE: Visit Follow-Up Question    Hi Erskin Meckel-  This is the first I heard that, not sure what happened but I will do it and refax it for you today. Have a nice week! Nolan Zhang    ----- Message -----   From: Roman De Los Santos   Sent: 7/9/2018 11:55 PM EDT   To: Robert Bearden PA-C  Subject: Visit Follow-Up Question    Hello. Just checking to see if my [facility] paperwork was fixed. Part A, question 8 needed to be filled in, signed and dated.

## 2018-07-10 NOTE — TELEPHONE ENCOUNTER
From: Cassandra Ortiz  To: Connie Johnston PA-C  Sent: 7/9/2018 11:55 PM EDT  Subject: Visit Follow-Up Question    Hello. Just checking to see if my [facility] paperwork was fixed. Part A, question 8 needed to be filled in, signed and dated.

## 2018-08-02 ENCOUNTER — PATIENT MESSAGE (OUTPATIENT)
Dept: FAMILY MEDICINE CLINIC | Age: 38
End: 2018-08-02

## 2018-08-02 DIAGNOSIS — E66.3 OVERWEIGHT: Primary | ICD-10-CM

## 2018-08-02 NOTE — TELEPHONE ENCOUNTER
From: Angela Abad  To: Yayo Holcomb PA-C  Sent: 8/2/2018 12:53 AM EDT  Subject: Non-Urgent Medical Question    I am still experiencing swelling in my feet/ankles. The vascular doctor said I was fine, but something is definitely wrong. I have been in physical therapy for the last couple weeks and walk for at least an hour daily, so I'm being as active as I can be right now. I'm slowly changing my diet as well. When I take otc diuretics the swelling does go away, but as soon as I stop them the swelling comes back. I also was hoping now that I am off my percocets if you would be willing to put me on a weight loss pill? We had discussed the one before that was a mixture of a bunch of meds, but you had said you could not prescribe it before due to my pain meds. I really am trying my hardest of my capabilities to eat better and exercise, but it's just not enough to do anything after all my weight gain from dealing with all my back pain since December.

## 2018-08-06 ENCOUNTER — TELEPHONE (OUTPATIENT)
Dept: FAMILY MEDICINE CLINIC | Age: 38
End: 2018-08-06

## 2018-08-28 ENCOUNTER — OFFICE VISIT (OUTPATIENT)
Dept: FAMILY MEDICINE CLINIC | Age: 38
End: 2018-08-28
Payer: COMMERCIAL

## 2018-08-28 VITALS
SYSTOLIC BLOOD PRESSURE: 120 MMHG | WEIGHT: 221 LBS | HEIGHT: 67 IN | OXYGEN SATURATION: 99 % | BODY MASS INDEX: 34.69 KG/M2 | DIASTOLIC BLOOD PRESSURE: 78 MMHG | HEART RATE: 98 BPM

## 2018-08-28 DIAGNOSIS — Z23 NEED FOR PROPHYLACTIC VACCINATION AGAINST DIPHTHERIA-TETANUS-PERTUSSIS (DTP): ICD-10-CM

## 2018-08-28 DIAGNOSIS — L30.9 DERMATITIS: Primary | ICD-10-CM

## 2018-08-28 DIAGNOSIS — F41.9 ANXIETY: ICD-10-CM

## 2018-08-28 DIAGNOSIS — E66.09 CLASS 1 OBESITY DUE TO EXCESS CALORIES WITHOUT SERIOUS COMORBIDITY WITH BODY MASS INDEX (BMI) OF 34.0 TO 34.9 IN ADULT: ICD-10-CM

## 2018-08-28 PROCEDURE — 99213 OFFICE O/P EST LOW 20 MIN: CPT | Performed by: PHYSICIAN ASSISTANT

## 2018-08-28 PROCEDURE — 90471 IMMUNIZATION ADMIN: CPT | Performed by: PHYSICIAN ASSISTANT

## 2018-08-28 PROCEDURE — 90715 TDAP VACCINE 7 YRS/> IM: CPT | Performed by: PHYSICIAN ASSISTANT

## 2018-08-28 RX ORDER — BUSPIRONE HYDROCHLORIDE 15 MG/1
30 TABLET ORAL 2 TIMES DAILY
Refills: 1 | COMMUNITY
Start: 2018-08-24 | End: 2019-01-14 | Stop reason: ALTCHOICE

## 2018-08-28 RX ORDER — TRIAMCINOLONE ACETONIDE 1 MG/G
CREAM TOPICAL
Qty: 60 G | Refills: 0 | Status: SHIPPED | OUTPATIENT
Start: 2018-08-28 | End: 2021-11-18

## 2018-08-28 RX ORDER — FLUTICASONE PROPIONATE 50 MCG
2 SPRAY, SUSPENSION (ML) NASAL 2 TIMES DAILY
Qty: 1 BOTTLE | Refills: 5 | Status: SHIPPED | OUTPATIENT
Start: 2018-08-28 | End: 2019-03-16 | Stop reason: SDUPTHER

## 2018-08-28 ASSESSMENT — ENCOUNTER SYMPTOMS
COUGH: 0
NAUSEA: 0
CONSTIPATION: 0
COLOR CHANGE: 0
SHORTNESS OF BREATH: 0
VOMITING: 0
ABDOMINAL PAIN: 0
DIARRHEA: 0
WHEEZING: 0

## 2018-08-28 ASSESSMENT — PATIENT HEALTH QUESTIONNAIRE - PHQ9
2. FEELING DOWN, DEPRESSED OR HOPELESS: 1
SUM OF ALL RESPONSES TO PHQ QUESTIONS 1-9: 2
SUM OF ALL RESPONSES TO PHQ9 QUESTIONS 1 & 2: 2
SUM OF ALL RESPONSES TO PHQ QUESTIONS 1-9: 2
1. LITTLE INTEREST OR PLEASURE IN DOING THINGS: 1

## 2018-08-28 NOTE — PROGRESS NOTES
Psychiatric/Behavioral: Negative for sleep disturbance. The patient is not nervous/anxious. Objective:     Physical Exam   Constitutional: She is oriented to person, place, and time. She appears well-developed and well-nourished. HENT:   Head: Normocephalic and atraumatic. Cardiovascular: Normal rate, regular rhythm and normal heart sounds. No murmur heard. Pulmonary/Chest: Effort normal and breath sounds normal. No respiratory distress. She has no wheezes. She has no rales. Neurological: She is alert and oriented to person, place, and time. Skin: Skin is warm and dry. Rash noted. No purpura noted. Rash is macular. Rash is not papular, not maculopapular, not nodular, not pustular, not vesicular and not urticarial. No erythema. No pallor. Psychiatric: She has a normal mood and affect. Her behavior is normal. Judgment and thought content normal.   Nursing note and vitals reviewed. /78   Pulse 98   Ht 5' 7\" (1.702 m)   Wt 221 lb (100.2 kg)   SpO2 99%   BMI 34.61 kg/m²     Assessment:       Diagnosis Orders   1. Dermatitis  triamcinolone (KENALOG) 0.1 % cream   2. Need for prophylactic vaccination against diphtheria-tetanus-pertussis (DTP)  Tdap (age 6y and older) IM (Boostrix)   3. Class 1 obesity due to excess calories without serious comorbidity with body mass index (BMI) of 34.0 to 34.9 in adult     4. Anxiety               Plan:      Return in about 3 months (around 11/28/2018) for med review. Patient tolerating contrave well, cont. No weight loss yet. She is still post op  Going back to work in 2 weeks  Discussed cutting back on carbs and restarting regular exercise  150min /wk of exercise is encouraged. Anxiety stable  Still following with Dr Karina Prado for the add  Update tdap  Topical steroid for rash. supsect contact derm  Patient agreed with treatment plan  Health Maintenance reviewed - tetanus booster given.       Orders Placed This Encounter   Procedures    Tdap (age 6y and older) IM (Boostrix)     Orders Placed This Encounter   Medications    fluticasone (FLONASE) 50 MCG/ACT nasal spray     Si sprays by Nasal route 2 times daily     Dispense:  1 Bottle     Refill:  5    triamcinolone (KENALOG) 0.1 % cream     Sig: Apply topically 2 times daily. Dispense:  60 g     Refill:  0       Patient given educational materials - see patient instructions. Discussed use, benefit, and side effects of prescribed medications. All patient questions answered. Pt voiced understanding. Reviewed health maintenance. Instructed to continue current medications, diet and exercise. Patient agreed with treatment plan. Follow up as directed.      Electronically signed by Dinah Wilson PA-C on 2018 at 1:49 PM

## 2018-09-06 ENCOUNTER — PATIENT MESSAGE (OUTPATIENT)
Dept: FAMILY MEDICINE CLINIC | Age: 38
End: 2018-09-06

## 2018-09-06 DIAGNOSIS — E66.3 OVERWEIGHT: ICD-10-CM

## 2018-11-05 ENCOUNTER — OFFICE VISIT (OUTPATIENT)
Dept: FAMILY MEDICINE CLINIC | Age: 38
End: 2018-11-05
Payer: COMMERCIAL

## 2018-11-05 ENCOUNTER — HOSPITAL ENCOUNTER (OUTPATIENT)
Age: 38
Setting detail: SPECIMEN
Discharge: HOME | End: 2018-11-05
Payer: COMMERCIAL

## 2018-11-05 VITALS
HEIGHT: 67 IN | BODY MASS INDEX: 32.11 KG/M2 | WEIGHT: 204.6 LBS | DIASTOLIC BLOOD PRESSURE: 74 MMHG | SYSTOLIC BLOOD PRESSURE: 116 MMHG | TEMPERATURE: 98.2 F | HEART RATE: 80 BPM | OXYGEN SATURATION: 98 %

## 2018-11-05 DIAGNOSIS — N30.00 ACUTE CYSTITIS WITHOUT HEMATURIA: ICD-10-CM

## 2018-11-05 DIAGNOSIS — Z20.2 POSSIBLE EXPOSURE TO STD: ICD-10-CM

## 2018-11-05 DIAGNOSIS — R30.0 DYSURIA: Primary | ICD-10-CM

## 2018-11-05 LAB
BILIRUBIN, POC: ABNORMAL
BLOOD URINE, POC: ABNORMAL
CLARITY, POC: CLEAR
COLOR, POC: YELLOW
DIRECT EXAM: ABNORMAL
GLUCOSE URINE, POC: ABNORMAL
KETONES, POC: ABNORMAL
LEUKOCYTE EST, POC: ABNORMAL
Lab: ABNORMAL
NITRITE, POC: ABNORMAL
PH, POC: 5.5
PROTEIN, POC: 30
SPECIFIC GRAVITY, POC: 1.02
SPECIMEN DESCRIPTION: ABNORMAL
STATUS: ABNORMAL
UROBILINOGEN, POC: 0.2

## 2018-11-05 PROCEDURE — 99213 OFFICE O/P EST LOW 20 MIN: CPT | Performed by: PHYSICIAN ASSISTANT

## 2018-11-05 PROCEDURE — 96372 THER/PROPH/DIAG INJ SC/IM: CPT | Performed by: PHYSICIAN ASSISTANT

## 2018-11-05 PROCEDURE — 81002 URINALYSIS NONAUTO W/O SCOPE: CPT | Performed by: PHYSICIAN ASSISTANT

## 2018-11-05 RX ORDER — CEFTRIAXONE 1 G/1
1 INJECTION, POWDER, FOR SOLUTION INTRAMUSCULAR; INTRAVENOUS ONCE
Status: COMPLETED | OUTPATIENT
Start: 2018-11-05 | End: 2018-11-05

## 2018-11-05 RX ORDER — CIPROFLOXACIN 500 MG/1
500 TABLET, FILM COATED ORAL 2 TIMES DAILY
Qty: 20 TABLET | Refills: 0 | Status: SHIPPED | OUTPATIENT
Start: 2018-11-05 | End: 2019-01-14 | Stop reason: ALTCHOICE

## 2018-11-05 RX ADMIN — CEFTRIAXONE 1 G: 1 INJECTION, POWDER, FOR SOLUTION INTRAMUSCULAR; INTRAVENOUS at 11:46

## 2018-11-05 ASSESSMENT — ENCOUNTER SYMPTOMS
NAUSEA: 0
COLOR CHANGE: 0
VOMITING: 0

## 2018-11-05 NOTE — PROGRESS NOTES
Lumbago     OAB (overactive bladder) 7/12/2017    Sciatica     Urge incontinence       Past Surgical History:   Procedure Laterality Date    BACK SURGERY  09/19/2016    Lumbar interbody fusion posterior, L5-S1    BACK SURGERY  04/23/2018    LUMBAR MICRO DISKECTOMY   L4-5    DILATION AND CURETTAGE OF UTERUS      LUMBAR FUSION  06/25/2018    OTHER SURGICAL HISTORY  03/26/2018    myelogram    TX LAMINECTOMY,>2 SGMT,LUMBAR N/A 4/23/2018    LUMBAR MICRO DISKECTOMY   L45 performed by Ronaldo Chavez MD at [address] INTRBDY N/A 6/25/2018    LUMBAR INTERBODY FUSION POSTERIOR L45 performed by Ronaldo Chavez MD at [address] EXTRACTION         Family History   Problem Relation Age of Onset    High Cholesterol Mother     Hypertension Father     Diabetes Neg Hx     Cancer Neg Hx        Social History   Substance Use Topics    Smoking status: Former Smoker     Packs/day: 1.00     Years: 15.00     Types: Cigarettes     Quit date: 7/13/2016    Smokeless tobacco: Never Used    Alcohol use Yes     1 Glasses of wine per week      Comment: Maybe a couple drinks a month      Current Outpatient Prescriptions   Medication Sig Dispense Refill    ciprofloxacin (CIPRO) 500 MG tablet Take 1 tablet by mouth 2 times daily 20 tablet 0    naltrexone-bupropion (CONTRAVE) 8-90 MG per extended release tablet 2 tablets  tablet 3    busPIRone (BUSPAR) 15 MG tablet Take 30 mg by mouth 2 times daily   1    Amphet-Dextroamphet 3-Bead ER (MYDAYIS) 50 MG CP24 Take 1 tablet by mouth daily. .      fluticasone (FLONASE) 50 MCG/ACT nasal spray 2 sprays by Nasal route 2 times daily 1 Bottle 5    triamcinolone (KENALOG) 0.1 % cream Apply topically 2 times daily.  60 g 0    ibuprofen (ADVIL;MOTRIN) 800 MG tablet TAKE 1 TABLET BY MOUTH EVERY SIX HOURS WITH FOOD or milk  1    tiZANidine (ZANAFLEX) 2 MG tablet Take 2 mg by mouth every 8 hours as needed      vitamin B-12 (CYANOCOBALAMIN) 1000 MCG tablet Take 1,000 mcg by mouth daily      Black Cohosh 540 MG CAPS Take 1 capsule by mouth 2 times daily      Multiple Vitamins-Minerals (THERAPEUTIC MULTIVITAMIN-MINERALS) tablet Take 1 tablet by mouth daily      diphenhydrAMINE (BENADRYL) 25 MG tablet Take 25 mg by mouth every 6 hours as needed for Itching      gabapentin (NEURONTIN) 600 MG tablet Take 300 mg by mouth daily. Kurtis November trospium (SANCTURA) 20 MG tablet TAKE 1 TABLET TWICE A  tablet 3    zolpidem (AMBIEN CR) 12.5 MG extended release tablet TAKE 1 TABLET BY MOUTH NIGHTLY AS NEEDED FOR SLEEP  0    Sennosides-Docusate Sodium (STOOL SOFTENER LAXATIVE PO) Take 1 tablet by mouth 2 times daily as needed       Lactobacillus (PROBIOTIC ACIDOPHILUS PO) Take 1 tablet by mouth daily       Current Facility-Administered Medications   Medication Dose Route Frequency Provider Last Rate Last Dose    cefTRIAXone (ROCEPHIN) injection 1 g  1 g Intramuscular Once Renee Valdez PAGeetaC         No Known Allergies    Health Maintenance   Topic Date Due    Flu vaccine (1) 09/01/2018    Cervical cancer screen  10/11/2022    DTaP/Tdap/Td vaccine (2 - Td) 08/28/2028    HIV screen  Completed       Subjective:     Review of Systems   Constitutional: Negative for activity change, appetite change, chills, fatigue, fever and unexpected weight change. Gastrointestinal: Negative for nausea and vomiting. Genitourinary: Positive for dysuria and frequency. Negative for decreased urine volume, difficulty urinating, dyspareunia, flank pain, hematuria, hesitancy, menstrual problem, pelvic pain, urgency, vaginal bleeding, vaginal discharge and vaginal pain. Skin: Negative for color change, pallor, rash and wound. Neurological: Negative for weakness and numbness. Hematological: Negative for adenopathy. Psychiatric/Behavioral: Negative for sleep disturbance. The patient is not nervous/anxious.         Objective:     Physical Exam Constitutional: She is oriented to person, place, and time. She appears well-developed and well-nourished. HENT:   Head: Normocephalic and atraumatic. Cardiovascular: Normal rate and regular rhythm. Pulmonary/Chest: Effort normal.   Abdominal: Soft. She exhibits no mass. There is tenderness (suprapubic). There is no rebound and no guarding. Genitourinary: Vagina normal and uterus normal. No vaginal discharge found. Genitourinary Comments: Cultures taken   Neurological: She is alert and oriented to person, place, and time. Skin: Skin is warm and dry. No rash noted. No erythema. No pallor. Psychiatric: She has a normal mood and affect. Her behavior is normal. Judgment and thought content normal.   Nursing note and vitals reviewed. /74   Pulse 80   Temp 98.2 °F (36.8 °C) (Oral)   Ht 5' 7\" (1.702 m)   Wt 204 lb 9.6 oz (92.8 kg)   SpO2 98%   BMI 32.04 kg/m²     Assessment:       Diagnosis Orders   1. Dysuria  POCT Urinalysis no Micro    Urine Culture    cefTRIAXone (ROCEPHIN) injection 1 g    ciprofloxacin (CIPRO) 500 MG tablet   2. Acute cystitis without hematuria  C.trachomatis N.gonorrhoeae DNA, Thin Prep    VAGINITIS DNA PROBE    Urine Culture    cefTRIAXone (ROCEPHIN) injection 1 g    ciprofloxacin (CIPRO) 500 MG tablet   3. Possible exposure to STD  C.trachomatis N.gonorrhoeae DNA, Thin Prep    VAGINITIS DNA PROBE    cefTRIAXone (ROCEPHIN) injection 1 g    ciprofloxacin (CIPRO) 500 MG tablet             Plan:      Return if symptoms worsen or fail to improve.     Send out urine culture and STI culture for G/C  Patient declined serum cultures  Stressed if any positive will NEED to have serum testing as well  Rocephin today and start cipro  Patient agreed to follow up if symptoms worsening/persisting  Otherwise await all results  Stressed abstain until results all back and discussed  Pt agreed       Orders Placed This Encounter   Procedures    C.trachomatis N.gonorrhoeae DNA, Thin Prep

## 2018-11-06 LAB
C TRACH DNA GENITAL QL NAA+PROBE: NEGATIVE
CULTURE: ABNORMAL
Lab: ABNORMAL
N. GONORRHOEAE DNA: NEGATIVE
ORGANISM: ABNORMAL
SPECIMEN DESCRIPTION: ABNORMAL
STATUS: ABNORMAL

## 2018-11-07 RX ORDER — METRONIDAZOLE 500 MG/1
500 TABLET ORAL 2 TIMES DAILY
Qty: 14 TABLET | Refills: 0 | Status: SHIPPED | OUTPATIENT
Start: 2018-11-07 | End: 2018-11-14

## 2018-11-07 RX ORDER — FLUCONAZOLE 100 MG/1
100 TABLET ORAL DAILY
Qty: 3 TABLET | Refills: 0 | Status: SHIPPED | OUTPATIENT
Start: 2018-11-07 | End: 2018-11-10

## 2018-12-17 DIAGNOSIS — E66.3 OVERWEIGHT: ICD-10-CM

## 2018-12-23 DIAGNOSIS — N39.41 URGE INCONTINENCE: ICD-10-CM

## 2018-12-26 RX ORDER — TROSPIUM CHLORIDE 20 MG/1
TABLET, FILM COATED ORAL
Qty: 180 TABLET | Refills: 3 | Status: SHIPPED | OUTPATIENT
Start: 2018-12-26 | End: 2019-12-10

## 2018-12-27 ENCOUNTER — TELEPHONE (OUTPATIENT)
Dept: FAMILY MEDICINE CLINIC | Age: 38
End: 2018-12-27

## 2018-12-27 DIAGNOSIS — E66.9 CLASS 1 OBESITY WITHOUT SERIOUS COMORBIDITY WITH BODY MASS INDEX (BMI) OF 32.0 TO 32.9 IN ADULT, UNSPECIFIED OBESITY TYPE: Primary | ICD-10-CM

## 2018-12-29 ENCOUNTER — PATIENT MESSAGE (OUTPATIENT)
Dept: FAMILY MEDICINE CLINIC | Age: 38
End: 2018-12-29

## 2019-01-14 ENCOUNTER — HOSPITAL ENCOUNTER (OUTPATIENT)
Age: 39
Setting detail: SPECIMEN
Discharge: HOME | End: 2019-01-14
Payer: COMMERCIAL

## 2019-01-14 ENCOUNTER — OFFICE VISIT (OUTPATIENT)
Dept: FAMILY MEDICINE CLINIC | Age: 39
End: 2019-01-14
Payer: COMMERCIAL

## 2019-01-14 VITALS
OXYGEN SATURATION: 99 % | HEIGHT: 67 IN | HEART RATE: 87 BPM | DIASTOLIC BLOOD PRESSURE: 70 MMHG | WEIGHT: 198.2 LBS | BODY MASS INDEX: 31.11 KG/M2 | SYSTOLIC BLOOD PRESSURE: 112 MMHG

## 2019-01-14 DIAGNOSIS — N91.2 AMENORRHEA: Primary | ICD-10-CM

## 2019-01-14 DIAGNOSIS — R25.2 MUSCLE CRAMPING: ICD-10-CM

## 2019-01-14 DIAGNOSIS — N91.2 AMENORRHEA: ICD-10-CM

## 2019-01-14 LAB
ABSOLUTE EOS #: 0.13 K/UL (ref 0–0.44)
ABSOLUTE IMMATURE GRANULOCYTE: 0.04 K/UL (ref 0–0.3)
ABSOLUTE LYMPH #: 1.99 K/UL (ref 1.1–3.7)
ABSOLUTE MONO #: 0.64 K/UL (ref 0.1–1.2)
ALBUMIN SERPL-MCNC: 4.4 G/DL (ref 3.5–5.2)
ALBUMIN/GLOBULIN RATIO: 1.6 (ref 1–2.5)
ALP BLD-CCNC: 71 U/L (ref 35–104)
ALT SERPL-CCNC: 16 U/L (ref 5–33)
ANION GAP SERPL CALCULATED.3IONS-SCNC: 13 MMOL/L (ref 9–17)
AST SERPL-CCNC: 14 U/L
BASOPHILS # BLD: 0 % (ref 0–2)
BASOPHILS ABSOLUTE: 0.04 K/UL (ref 0–0.2)
BILIRUB SERPL-MCNC: 0.39 MG/DL (ref 0.3–1.2)
BUN BLDV-MCNC: 13 MG/DL (ref 6–20)
BUN/CREAT BLD: NORMAL (ref 9–20)
CALCIUM SERPL-MCNC: 9.5 MG/DL (ref 8.6–10.4)
CHLORIDE BLD-SCNC: 101 MMOL/L (ref 98–107)
CO2: 23 MMOL/L (ref 20–31)
CREAT SERPL-MCNC: 0.58 MG/DL (ref 0.5–0.9)
DIFFERENTIAL TYPE: ABNORMAL
EOSINOPHILS RELATIVE PERCENT: 1 % (ref 1–4)
FOLLICLE STIMULATING HORMONE: 10.8 U/L (ref 1.7–21.5)
GFR AFRICAN AMERICAN: >60 ML/MIN
GFR NON-AFRICAN AMERICAN: >60 ML/MIN
GFR SERPL CREATININE-BSD FRML MDRD: NORMAL ML/MIN/{1.73_M2}
GFR SERPL CREATININE-BSD FRML MDRD: NORMAL ML/MIN/{1.73_M2}
GLUCOSE BLD-MCNC: 87 MG/DL (ref 70–99)
HCG QUALITATIVE: NEGATIVE
HCT VFR BLD CALC: 44.9 % (ref 36.3–47.1)
HEMOGLOBIN: 14.4 G/DL (ref 11.9–15.1)
IMMATURE GRANULOCYTES: 0 %
LH: 15 U/L (ref 1–95.6)
LYMPHOCYTES # BLD: 22 % (ref 24–43)
MAGNESIUM: 2 MG/DL (ref 1.6–2.6)
MCH RBC QN AUTO: 29.2 PG (ref 25.2–33.5)
MCHC RBC AUTO-ENTMCNC: 32.1 G/DL (ref 28.4–34.8)
MCV RBC AUTO: 91.1 FL (ref 82.6–102.9)
MONOCYTES # BLD: 7 % (ref 3–12)
NRBC AUTOMATED: 0 PER 100 WBC
PDW BLD-RTO: 12.8 % (ref 11.8–14.4)
PLATELET # BLD: 261 K/UL (ref 138–453)
PLATELET ESTIMATE: ABNORMAL
PMV BLD AUTO: 11 FL (ref 8.1–13.5)
POTASSIUM SERPL-SCNC: 4.1 MMOL/L (ref 3.7–5.3)
RBC # BLD: 4.93 M/UL (ref 3.95–5.11)
RBC # BLD: ABNORMAL 10*6/UL
SEG NEUTROPHILS: 70 % (ref 36–65)
SEGMENTED NEUTROPHILS ABSOLUTE COUNT: 6.21 K/UL (ref 1.5–8.1)
SEX HORMONE BINDING GLOBULIN: 43 NMOL/L (ref 30–135)
SODIUM BLD-SCNC: 137 MMOL/L (ref 135–144)
TESTOSTERONE FREE-NONMALE: 1.7 PG/ML (ref 1.3–9.2)
TESTOSTERONE TOTAL: 11 NG/DL (ref 20–70)
TOTAL PROTEIN: 7.1 G/DL (ref 6.4–8.3)
TSH SERPL DL<=0.05 MIU/L-ACNC: 1.65 MIU/L (ref 0.3–5)
WBC # BLD: 9.1 K/UL (ref 3.5–11.3)
WBC # BLD: ABNORMAL 10*3/UL

## 2019-01-14 PROCEDURE — 99213 OFFICE O/P EST LOW 20 MIN: CPT | Performed by: PHYSICIAN ASSISTANT

## 2019-01-14 RX ORDER — ARIPIPRAZOLE 5 MG/1
5 TABLET ORAL DAILY
COMMUNITY
End: 2019-04-15 | Stop reason: ALTCHOICE

## 2019-01-14 ASSESSMENT — ENCOUNTER SYMPTOMS
CONSTIPATION: 0
COUGH: 0
VOMITING: 0
ABDOMINAL PAIN: 0
DIARRHEA: 0
NAUSEA: 0
COLOR CHANGE: 0
SHORTNESS OF BREATH: 0

## 2019-01-18 DIAGNOSIS — E34.9 HYPOTESTOSTERONEMIA: Primary | ICD-10-CM

## 2019-01-18 LAB
ESTRADIOL LEVEL: 90.7 PG/ML
ESTROGEN TOTAL: 146.7 PG/ML
ESTRONE: 56 PG/ML

## 2019-01-19 ENCOUNTER — PATIENT MESSAGE (OUTPATIENT)
Dept: FAMILY MEDICINE CLINIC | Age: 39
End: 2019-01-19

## 2019-01-22 ENCOUNTER — TELEPHONE (OUTPATIENT)
Dept: FAMILY MEDICINE CLINIC | Age: 39
End: 2019-01-22

## 2019-01-22 DIAGNOSIS — E66.9 CLASS 1 OBESITY WITHOUT SERIOUS COMORBIDITY WITH BODY MASS INDEX (BMI) OF 32.0 TO 32.9 IN ADULT, UNSPECIFIED OBESITY TYPE: ICD-10-CM

## 2019-01-23 ENCOUNTER — TELEPHONE (OUTPATIENT)
Dept: FAMILY MEDICINE CLINIC | Age: 39
End: 2019-01-23

## 2019-01-23 RX ORDER — LORCASERIN HYDROCHLORIDE HEMIHYDRATE 20 MG/1
TABLET, FILM COATED, EXTENDED RELEASE ORAL
Qty: 30 TABLET | Refills: 0 | Status: SHIPPED | OUTPATIENT
Start: 2019-01-23 | End: 2019-01-29 | Stop reason: ALTCHOICE

## 2019-01-29 ENCOUNTER — HOSPITAL ENCOUNTER (OUTPATIENT)
Age: 39
Setting detail: SPECIMEN
Discharge: HOME | End: 2019-01-29
Payer: COMMERCIAL

## 2019-01-29 ENCOUNTER — PATIENT MESSAGE (OUTPATIENT)
Dept: FAMILY MEDICINE CLINIC | Age: 39
End: 2019-01-29

## 2019-01-29 LAB
CHOLESTEROL, FASTING: 162 MG/DL
CHOLESTEROL/HDL RATIO: 4.5
GLUCOSE FASTING: 77 MG/DL (ref 70–99)
HDLC SERPL-MCNC: 36 MG/DL
INSULIN COMMENT: NORMAL
INSULIN REFERENCE RANGE:: NORMAL
INSULIN: 5.6 MU/L
LDL CHOLESTEROL: 100 MG/DL (ref 0–130)
TRIGLYCERIDE, FASTING: 132 MG/DL
VLDLC SERPL CALC-MCNC: ABNORMAL MG/DL (ref 1–30)

## 2019-01-30 LAB — DHEAS (DHEA SULFATE): 126 UG/DL (ref 45–270)

## 2019-02-01 LAB — 17 OH PROGESTERONE: 36.72 NG/DL

## 2019-03-17 RX ORDER — FLUTICASONE PROPIONATE 50 MCG
SPRAY, SUSPENSION (ML) NASAL
Qty: 16 G | Refills: 4 | Status: SHIPPED | OUTPATIENT
Start: 2019-03-17 | End: 2019-08-27 | Stop reason: SDUPTHER

## 2019-04-15 ENCOUNTER — OFFICE VISIT (OUTPATIENT)
Dept: FAMILY MEDICINE CLINIC | Age: 39
End: 2019-04-15
Payer: COMMERCIAL

## 2019-04-15 VITALS
WEIGHT: 193 LBS | OXYGEN SATURATION: 98 % | SYSTOLIC BLOOD PRESSURE: 122 MMHG | HEIGHT: 67 IN | HEART RATE: 75 BPM | DIASTOLIC BLOOD PRESSURE: 70 MMHG | BODY MASS INDEX: 30.29 KG/M2

## 2019-04-15 DIAGNOSIS — E66.3 OVERWEIGHT: Primary | ICD-10-CM

## 2019-04-15 PROCEDURE — 99213 OFFICE O/P EST LOW 20 MIN: CPT | Performed by: PHYSICIAN ASSISTANT

## 2019-04-15 RX ORDER — DEXTROAMPHETAMINE SACCHARATE, AMPHETAMINE ASPARTATE, DEXTROAMPHETAMINE SULFATE AND AMPHETAMINE SULFATE 5; 5; 5; 5 MG/1; MG/1; MG/1; MG/1
20 TABLET ORAL 2 TIMES DAILY
COMMUNITY
End: 2019-08-29 | Stop reason: SDUPTHER

## 2019-04-15 ASSESSMENT — ENCOUNTER SYMPTOMS
SHORTNESS OF BREATH: 0
WHEEZING: 0
COLOR CHANGE: 0
COUGH: 0
ABDOMINAL PAIN: 0
CONSTIPATION: 0
DIARRHEA: 0
VOMITING: 0
NAUSEA: 0

## 2019-04-15 ASSESSMENT — PATIENT HEALTH QUESTIONNAIRE - PHQ9
2. FEELING DOWN, DEPRESSED OR HOPELESS: 1
SUM OF ALL RESPONSES TO PHQ QUESTIONS 1-9: 2
1. LITTLE INTEREST OR PLEASURE IN DOING THINGS: 1
SUM OF ALL RESPONSES TO PHQ QUESTIONS 1-9: 2
SUM OF ALL RESPONSES TO PHQ9 QUESTIONS 1 & 2: 2

## 2019-04-15 NOTE — PROGRESS NOTES
[address] [address]  Dept: [phone]  Dept Fax: [phone]     Coyd Easley is a 46 y.o. female who presents today for her medical conditions/complaintsas noted below. Cody Easley is c/o of   Chief Complaint   Patient presents with    Anxiety     Patient is here for follow up on anxiety    Depression     Patient is here for follow up on depression         HPI:      HPI    Patient here for recheck on her medication  She states she had seen Dr. Troy Holder recently and they tried prozac. She states was not able to tolerate it so she stopped it. She has continued to follow with Dr Troy Holder and is on adderall now  She tried saxenda for weight loss but did not tolerate due to feeling poorly. She did the best on the contrave and would like to try that again.      No results found for: LABA1C          ( goal A1Cis < 7)   No results found for: LABMICR  LDL Cholesterol (mg/dL)   Date Value   01/29/2019 100   05/18/2018 145 (H)   01/20/2017 130       (goal LDL is <100)   AST (U/L)   Date Value   01/14/2019 14     ALT (U/L)   Date Value   01/14/2019 16     BUN (mg/dL)   Date Value   01/14/2019 13     BP Readings from Last 3 Encounters:   04/15/19 122/70   01/14/19 112/70   11/05/18 116/74          (goal 120/80)    Past Medical History:   Diagnosis Date    ADD (attention deficit disorder)     Anxiety     Carpal tunnel syndrome, bilateral 05/19/2016    Chronic back pain     started pain mgmnt 2/218    Depression     Foot pain     left (d/t back)    GERD (gastroesophageal reflux disease)     \"mild\"  no medications    Hx of blood clots     right leg superficial blood clot    Hyperlipidemia     watching diet    Lumbago     OAB (overactive bladder) 7/12/2017    Sciatica     Urge incontinence       Past Surgical History:   Procedure Laterality Date    BACK SURGERY  09/19/2016    Lumbar interbody fusion posterior, L5-S1  BACK SURGERY  2018    LUMBAR MICRO DISKECTOMY   L4-5    DILATION AND CURETTAGE OF UTERUS      LUMBAR FUSION  2018    OTHER SURGICAL HISTORY  2018    myelogram    [place] LAMINECTOMY,>2 SGMT,LUMBAR N/A 2018    LUMBAR MICRO DISKECTOMY   L45 performed by Mar Jo MD at [address] INTRBDY N/A 2018    LUMBAR INTERBODY FUSION POSTERIOR L45 performed by Mar Jo MD at [facility] EXTRACTION         Family History   Problem Relation Age of Onset    High Cholesterol Mother     Hypertension Father     Diabetes Neg Hx     Cancer Neg Hx           Social History     Tobacco Use    Smoking status: Former Smoker     Packs/day: 1.00     Years: 15.00     Pack years: 15.00     Types: Cigarettes     Last attempt to quit: 2016     Years since quittin.7    Smokeless tobacco: Never Used   Substance Use Topics    Alcohol use: Yes     Types: 1 Glasses of wine per week     Comment: Maybe a couple drinks a month         Current Outpatient Medications   Medication Sig Dispense Refill    amphetamine-dextroamphetamine (ADDERALL) 20 MG tablet Take 20 mg by mouth 2 times daily.  naltrexone-bupropion (CONTRAVE) 8-90 MG per extended release tablet Week 1: 1 tablet in AM, Week 2: 1 po BID, Week 3: 2 tablets in AM, 1 tablet in PM, Week 4: 2 tablets  tablet 0    fluticasone (FLONASE) 50 MCG/ACT nasal spray USE 2 SPRAYS IN NOSTRIL TWICE DAILY  16 g 4    trospium (SANCTURA) 20 MG tablet TAKE 1 TABLET TWICE A  tablet 3    triamcinolone (KENALOG) 0.1 % cream Apply topically 2 times daily.  60 g 0    ibuprofen (ADVIL;MOTRIN) 800 MG tablet TAKE 1 TABLET BY MOUTH EVERY SIX HOURS WITH FOOD or milk  1    vitamin B-12 (CYANOCOBALAMIN) 1000 MCG tablet Take 1,000 mcg by mouth daily      Multiple Vitamins-Minerals (THERAPEUTIC MULTIVITAMIN-MINERALS) tablet Take 1 tablet by mouth daily      diphenhydrAMINE (BENADRYL) 25 MG tablet Take 25 mg by mouth every 6 hours as needed for Itching      zolpidem (AMBIEN CR) 12.5 MG extended release tablet TAKE 1 TABLET BY MOUTH NIGHTLY AS NEEDED FOR SLEEP  0    Sennosides-Docusate Sodium (STOOL SOFTENER LAXATIVE PO) Take 1 tablet by mouth 2 times daily as needed       Lactobacillus (PROBIOTIC ACIDOPHILUS PO) Take 1 tablet by mouth daily       No current facility-administered medications for this visit. No Known Allergies    Health Maintenance   Topic Date Due    Varicella Vaccine (1 of 2 - 13+ 2-dose series) 03/22/1994    Flu vaccine (Season Ended) 09/01/2019    Cervical cancer screen  10/11/2022    DTaP/Tdap/Td vaccine (2 - Td) 08/28/2028    HIV screen  Completed    Pneumococcal 0-64 years Vaccine  Aged Out       Subjective:     Review of Systems   Constitutional: Negative for activity change, appetite change, fatigue, fever and unexpected weight change. /70   Pulse 75   Ht 5' 7\" (1.702 m)   Wt 193 lb (87.5 kg)   SpO2 98%   BMI 30.23 kg/m²    Respiratory: Negative for cough, shortness of breath and wheezing. Cardiovascular: Negative for chest pain and palpitations. Gastrointestinal: Negative for abdominal pain, constipation, diarrhea, nausea and vomiting. Skin: Negative for color change, pallor, rash and wound. Neurological: Negative for weakness. Hematological: Negative for adenopathy. Psychiatric/Behavioral: Negative for sleep disturbance. The patient is not nervous/anxious. Objective:     Physical Exam   Constitutional: She appears well-developed and well-nourished. HENT:   Head: Normocephalic and atraumatic. Cardiovascular: Normal rate and regular rhythm. Pulmonary/Chest: Effort normal and breath sounds normal. No respiratory distress. She has no wheezes. She has no rales. Neurological: She is alert. Skin: Skin is warm and dry. No rash noted. No erythema. No pallor. Psychiatric: She has a normal mood and affect. Her behavior is normal. Judgment and thought content normal.   Nursing note and vitals reviewed. /70   Pulse 75   Ht 5' 7\" (1.702 m)   Wt 193 lb (87.5 kg)   SpO2 98%   BMI 30.23 kg/m²     Assessment:          Diagnosis Orders   1. Overweight  naltrexone-bupropion (CONTRAVE) 8-90 MG per extended release tablet             Plan:      Return in about 1 month (around 5/15/2019) for med review. Patient weight is coming down with her own efforts but her BMI is still over 30  She did not tolerate belviq or saxenda, would like to try contrave  rx for contrave given with tapering instructions  Cont with psychiatry as planned  Recommended recheck in 1 mo sooner prn  Pt agreed with treatment plan     Orders Placed This Encounter   Medications    naltrexone-bupropion (CONTRAVE) 8-90 MG per extended release tablet     Sig: Week 1: 1 tablet in AM, Week 2: 1 po BID, Week 3: 2 tablets in AM, 1 tablet in PM, Week 4: 2 tablets BID     Dispense:  120 tablet     Refill:  0       Patient given educational materials - see patient instructions. Discussed use, benefit, and side effects of prescribed medications. All patientquestions answered. Pt voiced understanding. Reviewed health maintenance. Instructedto continue current medications, diet and exercise. Patient agreed with treatmentplan. Follow up as directed.      Electronically signed by Otis Mayfield PA-C on 4/15/2019 at 2:24 PM

## 2019-05-21 ENCOUNTER — TELEPHONE (OUTPATIENT)
Dept: FAMILY MEDICINE CLINIC | Age: 39
End: 2019-05-21

## 2019-07-08 ENCOUNTER — OFFICE VISIT (OUTPATIENT)
Dept: FAMILY MEDICINE CLINIC | Age: 39
End: 2019-07-08
Payer: COMMERCIAL

## 2019-07-08 VITALS
RESPIRATION RATE: 18 BRPM | SYSTOLIC BLOOD PRESSURE: 116 MMHG | TEMPERATURE: 97 F | DIASTOLIC BLOOD PRESSURE: 78 MMHG | HEART RATE: 87 BPM | OXYGEN SATURATION: 98 % | WEIGHT: 197 LBS | BODY MASS INDEX: 30.85 KG/M2

## 2019-07-08 DIAGNOSIS — M54.50 CHRONIC MIDLINE LOW BACK PAIN WITHOUT SCIATICA: Primary | ICD-10-CM

## 2019-07-08 DIAGNOSIS — G89.29 CHRONIC MIDLINE LOW BACK PAIN WITHOUT SCIATICA: Primary | ICD-10-CM

## 2019-07-08 PROCEDURE — 99213 OFFICE O/P EST LOW 20 MIN: CPT | Performed by: PHYSICIAN ASSISTANT

## 2019-07-08 ASSESSMENT — ENCOUNTER SYMPTOMS
NAUSEA: 0
VOMITING: 0
SHORTNESS OF BREATH: 0
CONSTIPATION: 0
DIARRHEA: 0
ABDOMINAL PAIN: 0
BACK PAIN: 1
COUGH: 0
COLOR CHANGE: 0

## 2019-07-08 NOTE — PROGRESS NOTES
25 MG tablet Take 25 mg by mouth every 6 hours as needed for Itching      Sennosides-Docusate Sodium (STOOL SOFTENER LAXATIVE PO) Take 1 tablet by mouth 2 times daily as needed        No current facility-administered medications for this visit. No Known Allergies    Health Maintenance   Topic Date Due    Varicella Vaccine (1 of 2 - 13+ 2-dose series) 03/22/1994    Flu vaccine (1) 09/01/2019    Cervical cancer screen  10/11/2022    DTaP/Tdap/Td vaccine (2 - Td) 08/28/2028    HIV screen  Completed    Pneumococcal 0-64 years Vaccine  Aged Out       Subjective:     Review of Systems   Constitutional: Negative for activity change, appetite change, fatigue and fever. /78   Pulse 87   Temp 97 °F (36.1 °C) (Tympanic)   Resp 18   Wt 197 lb (89.4 kg)   SpO2 98%   BMI 30.85 kg/m²    Respiratory: Negative for cough and shortness of breath. Cardiovascular: Negative for chest pain. Gastrointestinal: Negative for abdominal pain, constipation, diarrhea, nausea and vomiting. Musculoskeletal: Positive for back pain. Skin: Negative for color change, pallor, rash and wound. Hematological: Negative for adenopathy. Psychiatric/Behavioral: Negative for sleep disturbance. The patient is not nervous/anxious. Objective:     Physical Exam   Constitutional: She is oriented to person, place, and time. She appears well-developed and well-nourished. HENT:   Head: Normocephalic and atraumatic. Neurological: She is alert and oriented to person, place, and time. Skin: Skin is warm and dry. No rash noted. No erythema. No pallor. Psychiatric: She has a normal mood and affect. Her behavior is normal. Judgment and thought content normal.   Nursing note and vitals reviewed. /78   Pulse 87   Temp 97 °F (36.1 °C) (Tympanic)   Resp 18   Wt 197 lb (89.4 kg)   SpO2 98%   BMI 30.85 kg/m²     Assessment:          Diagnosis Orders   1.  Chronic midline low back pain without sciatica Plan:      Return in about 6 months (around 1/8/2020) for chronic back pain. addended prior FMLA paperwork for patient today  Patient planning to follow with Dr. Marcella lamas at this point  Patient agreed with treatment plan       Patient given educational materials - see patient instructions. Discussed use, benefit, and side effects of prescribed medications. All patientquestions answered. Pt voiced understanding. Reviewed health maintenance. Instructedto continue current medications, diet and exercise. Patient agreed with treatmentplan. Follow up as directed.      Electronically signed by Tanya Raymond PA-C on 7/8/2019 at 2:48 PM

## 2019-07-08 NOTE — PROGRESS NOTES
Visit Information    Have you changed or started any medications since your last visit including any over-the-counter medicines, vitamins, or herbal medicines? no   Are you having any side effects from any of your medications? -  no  Have you stopped taking any of your medications? Is so, why? -  no    Have you seen any other physician or provider since your last visit? No  Have you had any other diagnostic tests since your last visit? No  Have you been seen in the emergency room and/or had an admission to a hospital since we last saw you? No  Have you had your routine dental cleaning in the past 6 months? yes - feb 2019    Have you activated your [employer] account? If not, what are your barriers?  Yes     Patient Care Team:  Tony Guerra PA-C as PCP - General (Family Medicine)  Tony Guerra PA-C as PCP - [facility]    Medical History Review  Past Medical, Family, and Social History reviewed and does contribute to the patient presenting condition    Health Maintenance   Topic Date Due    Varicella Vaccine (1 of 2 - 13+ 2-dose series) 03/22/1994    Flu vaccine (1) 09/01/2019    Cervical cancer screen  10/11/2022    DTaP/Tdap/Td vaccine (2 - Td) 08/28/2028    HIV screen  Completed    Pneumococcal 0-64 years Vaccine  Aged Out

## 2019-07-09 ENCOUNTER — PATIENT MESSAGE (OUTPATIENT)
Dept: FAMILY MEDICINE CLINIC | Age: 39
End: 2019-07-09

## 2019-07-09 RX ORDER — IBUPROFEN 800 MG/1
TABLET ORAL
Qty: 120 TABLET | Refills: 1 | Status: SHIPPED | OUTPATIENT
Start: 2019-07-09 | End: 2020-10-12 | Stop reason: SDUPTHER

## 2019-07-09 NOTE — TELEPHONE ENCOUNTER
From: Nhi Lopez  To: Martínez Landry PA-C  Sent: 7/9/2019 3:21 AM EDT  Subject: Prescription Question    I checked my medical/prescription plan section in my contract and is does not state that weight loss pills are excluded. I'm thinking contrave maybe was because it was new and there's not really a generic. So I'm guessing adipex would be ok since it's been around forever if you don't mind, unless you have something else in mind. Also wondered if you ever sent over my prescription for ibuprofen 800. Thank you!

## 2019-08-06 ENCOUNTER — PATIENT MESSAGE (OUTPATIENT)
Dept: FAMILY MEDICINE CLINIC | Age: 39
End: 2019-08-06

## 2019-08-06 DIAGNOSIS — M79.673 PAIN OF FOOT, UNSPECIFIED LATERALITY: ICD-10-CM

## 2019-08-06 DIAGNOSIS — G89.29 CHRONIC MIDLINE LOW BACK PAIN WITHOUT SCIATICA: Primary | ICD-10-CM

## 2019-08-06 DIAGNOSIS — M43.16 SPONDYLOLISTHESIS OF LUMBAR REGION: ICD-10-CM

## 2019-08-06 DIAGNOSIS — M54.50 CHRONIC MIDLINE LOW BACK PAIN WITHOUT SCIATICA: Primary | ICD-10-CM

## 2019-08-06 NOTE — TELEPHONE ENCOUNTER
From: Tammy Chan  To: Bre Rincon PA-C  Sent: 8/6/2019 3:04 PM EDT  Subject: Visit Follow-Up Question    I have an appointment with Dr Shreya Chaudhary on August 27. I asked if I could get an MRI ordered prior to my appointment due to my sciatica issues, but they told me that insurance won't allow it until he sees me. The nurse suggested since you've seen me recently to ask if you could order the MRI instead so I could have it for my appointment with him at the end of the month. The numbness in my toe is getting worse, so I'm worried that I may end up with more permanent nerve damage on top of what I already have. Thank you.

## 2019-08-20 ENCOUNTER — HOSPITAL ENCOUNTER (OUTPATIENT)
Dept: MRI IMAGING | Age: 39
Discharge: HOME | End: 2019-08-22
Payer: COMMERCIAL

## 2019-08-20 DIAGNOSIS — M54.50 CHRONIC MIDLINE LOW BACK PAIN WITHOUT SCIATICA: ICD-10-CM

## 2019-08-20 DIAGNOSIS — G89.29 CHRONIC MIDLINE LOW BACK PAIN WITHOUT SCIATICA: ICD-10-CM

## 2019-08-20 DIAGNOSIS — M79.673 PAIN OF FOOT, UNSPECIFIED LATERALITY: ICD-10-CM

## 2019-08-20 DIAGNOSIS — M43.16 SPONDYLOLISTHESIS OF LUMBAR REGION: ICD-10-CM

## 2019-08-20 PROCEDURE — 72148 MRI LUMBAR SPINE W/O DYE: CPT

## 2019-08-27 RX ORDER — FLUTICASONE PROPIONATE 50 MCG
SPRAY, SUSPENSION (ML) NASAL
Qty: 16 G | Refills: 3 | Status: SHIPPED | OUTPATIENT
Start: 2019-08-27 | End: 2021-06-22 | Stop reason: SDUPTHER

## 2019-08-29 ENCOUNTER — OFFICE VISIT (OUTPATIENT)
Dept: FAMILY MEDICINE CLINIC | Age: 39
End: 2019-08-29
Payer: COMMERCIAL

## 2019-08-29 VITALS
WEIGHT: 195.8 LBS | HEIGHT: 67 IN | BODY MASS INDEX: 30.73 KG/M2 | HEART RATE: 82 BPM | DIASTOLIC BLOOD PRESSURE: 74 MMHG | OXYGEN SATURATION: 99 % | SYSTOLIC BLOOD PRESSURE: 118 MMHG

## 2019-08-29 DIAGNOSIS — R68.89 LIGHT SENSITIVITY: ICD-10-CM

## 2019-08-29 DIAGNOSIS — F98.8 ATTENTION DEFICIT DISORDER, UNSPECIFIED HYPERACTIVITY PRESENCE: Primary | ICD-10-CM

## 2019-08-29 PROCEDURE — 99213 OFFICE O/P EST LOW 20 MIN: CPT | Performed by: PHYSICIAN ASSISTANT

## 2019-08-29 RX ORDER — DEXTROAMPHETAMINE SACCHARATE, AMPHETAMINE ASPARTATE, DEXTROAMPHETAMINE SULFATE AND AMPHETAMINE SULFATE 5; 5; 5; 5 MG/1; MG/1; MG/1; MG/1
20 TABLET ORAL 2 TIMES DAILY
Qty: 60 TABLET | Refills: 0 | Status: SHIPPED | OUTPATIENT
Start: 2019-08-29 | End: 2019-09-25 | Stop reason: SDUPTHER

## 2019-08-29 RX ORDER — GABAPENTIN 100 MG/1
100 CAPSULE ORAL 3 TIMES DAILY
COMMUNITY
End: 2020-05-04 | Stop reason: ALTCHOICE

## 2019-08-29 ASSESSMENT — ENCOUNTER SYMPTOMS
SHORTNESS OF BREATH: 0
COLOR CHANGE: 0
EYE PAIN: 0
NAUSEA: 0
COUGH: 0
VOMITING: 0
WHEEZING: 0
CONSTIPATION: 0
PHOTOPHOBIA: 0
DIARRHEA: 0
ABDOMINAL PAIN: 0

## 2019-08-29 NOTE — PROGRESS NOTES
Visit Information    Have you changed or started any medications since your last visit including any over-the-counter medicines, vitamins, or herbal medicines? no   Have you stopped taking any of your medications? Is so, why? -  no  Are you having any side effects from any of your medications? - no    Have you seen any other physician or provider since your last visit?  no   Have you had any other diagnostic tests since your last visit?  no   Have you been seen in the emergency room and/or had an admission in a hospital since we last saw you?  no   Have you had your routine dental cleaning in the past 6 months?  no     Do you have an active MyChart account? If no, what is the barrier?   Yes    Patient Care Team:  Heather Kent PA-C as PCP - General (Family Medicine)  Heather Kent PA-C as PCP - [facility]    Medical History Review  Past Medical, Family, and Social History reviewed and does contribute to the patient presenting condition    Health Maintenance   Topic Date Due    Varicella Vaccine (1 of 2 - 13+ 2-dose series) 03/22/1994    Flu vaccine (1) 09/01/2019    Cervical cancer screen  10/11/2022    DTaP/Tdap/Td vaccine (2 - Td) 08/28/2028    HIV screen  Completed    Pneumococcal 0-64 years Vaccine  Aged Out

## 2019-09-11 DIAGNOSIS — F98.8 ATTENTION DEFICIT DISORDER, UNSPECIFIED HYPERACTIVITY PRESENCE: ICD-10-CM

## 2019-09-24 DIAGNOSIS — F98.8 ATTENTION DEFICIT DISORDER, UNSPECIFIED HYPERACTIVITY PRESENCE: ICD-10-CM

## 2019-09-25 RX ORDER — DEXTROAMPHETAMINE SACCHARATE, AMPHETAMINE ASPARTATE, DEXTROAMPHETAMINE SULFATE AND AMPHETAMINE SULFATE 5; 5; 5; 5 MG/1; MG/1; MG/1; MG/1
20 TABLET ORAL 2 TIMES DAILY
Qty: 60 TABLET | Refills: 0 | Status: SHIPPED | OUTPATIENT
Start: 2019-09-25 | End: 2019-10-24 | Stop reason: SDUPTHER

## 2019-09-27 ENCOUNTER — TELEPHONE (OUTPATIENT)
Dept: FAMILY MEDICINE CLINIC | Age: 39
End: 2019-09-27

## 2019-10-08 ENCOUNTER — HOSPITAL ENCOUNTER (OUTPATIENT)
Age: 39
Setting detail: SPECIMEN
Discharge: HOME | End: 2019-10-08
Payer: COMMERCIAL

## 2019-10-08 ENCOUNTER — OFFICE VISIT (OUTPATIENT)
Dept: FAMILY MEDICINE CLINIC | Age: 39
End: 2019-10-08
Payer: COMMERCIAL

## 2019-10-08 VITALS
SYSTOLIC BLOOD PRESSURE: 120 MMHG | WEIGHT: 196 LBS | HEART RATE: 79 BPM | DIASTOLIC BLOOD PRESSURE: 78 MMHG | HEIGHT: 67 IN | OXYGEN SATURATION: 98 % | BODY MASS INDEX: 30.76 KG/M2

## 2019-10-08 DIAGNOSIS — M54.42 CHRONIC MIDLINE LOW BACK PAIN WITH LEFT-SIDED SCIATICA: Primary | ICD-10-CM

## 2019-10-08 DIAGNOSIS — N91.2 AMENORRHEA: ICD-10-CM

## 2019-10-08 DIAGNOSIS — G89.29 CHRONIC MIDLINE LOW BACK PAIN WITH LEFT-SIDED SCIATICA: Primary | ICD-10-CM

## 2019-10-08 LAB
FOLLICLE STIMULATING HORMONE: 18.7 U/L (ref 1.7–21.5)
HCG QUALITATIVE: NEGATIVE
HCG QUANTITATIVE: <1 IU/L
LH: 15.7 U/L (ref 1–95.6)
TESTOSTERONE TOTAL: <3 NG/DL (ref 20–70)
TSH SERPL DL<=0.05 MIU/L-ACNC: 2.38 MIU/L (ref 0.3–5)

## 2019-10-08 PROCEDURE — 99213 OFFICE O/P EST LOW 20 MIN: CPT | Performed by: PHYSICIAN ASSISTANT

## 2019-10-08 ASSESSMENT — ENCOUNTER SYMPTOMS
DIARRHEA: 0
ABDOMINAL PAIN: 0
COUGH: 0
BACK PAIN: 1
COLOR CHANGE: 0
NAUSEA: 0
SHORTNESS OF BREATH: 0
CONSTIPATION: 0
VOMITING: 0

## 2019-10-13 LAB
ESTRADIOL LEVEL: 30.9 PG/ML
ESTROGEN TOTAL: 47.2 PG/ML
ESTRONE: 16.3 PG/ML

## 2019-10-14 DIAGNOSIS — N91.2 AMENORRHEA: Primary | ICD-10-CM

## 2019-10-22 DIAGNOSIS — F98.8 ATTENTION DEFICIT DISORDER, UNSPECIFIED HYPERACTIVITY PRESENCE: ICD-10-CM

## 2019-10-23 ENCOUNTER — PATIENT MESSAGE (OUTPATIENT)
Dept: FAMILY MEDICINE CLINIC | Age: 39
End: 2019-10-23

## 2019-10-23 RX ORDER — DEXTROAMPHETAMINE SACCHARATE, AMPHETAMINE ASPARTATE, DEXTROAMPHETAMINE SULFATE AND AMPHETAMINE SULFATE 5; 5; 5; 5 MG/1; MG/1; MG/1; MG/1
20 TABLET ORAL 2 TIMES DAILY
Qty: 60 TABLET | Refills: 0 | OUTPATIENT
Start: 2019-10-23 | End: 2019-11-22

## 2019-10-24 RX ORDER — DEXTROAMPHETAMINE SACCHARATE, AMPHETAMINE ASPARTATE, DEXTROAMPHETAMINE SULFATE AND AMPHETAMINE SULFATE 5; 5; 5; 5 MG/1; MG/1; MG/1; MG/1
20 TABLET ORAL 2 TIMES DAILY
Qty: 60 TABLET | Refills: 0 | Status: SHIPPED | OUTPATIENT
Start: 2019-10-24 | End: 2021-11-18

## 2019-11-04 ENCOUNTER — PATIENT MESSAGE (OUTPATIENT)
Dept: FAMILY MEDICINE CLINIC | Age: 39
End: 2019-11-04

## 2019-11-07 ENCOUNTER — OFFICE VISIT (OUTPATIENT)
Dept: OBGYN CLINIC | Age: 39
End: 2019-11-07
Payer: COMMERCIAL

## 2019-11-07 VITALS
HEIGHT: 67 IN | DIASTOLIC BLOOD PRESSURE: 82 MMHG | BODY MASS INDEX: 31.39 KG/M2 | SYSTOLIC BLOOD PRESSURE: 142 MMHG | WEIGHT: 200 LBS

## 2019-11-07 DIAGNOSIS — E28.39 PREMATURE OVARIAN INSUFFICIENCY: ICD-10-CM

## 2019-11-07 DIAGNOSIS — Z00.00 ENCOUNTER FOR MEDICAL EXAMINATION TO ESTABLISH CARE: ICD-10-CM

## 2019-11-07 DIAGNOSIS — N91.1 SECONDARY AMENORRHEA: Primary | ICD-10-CM

## 2019-11-07 PROCEDURE — 99203 OFFICE O/P NEW LOW 30 MIN: CPT | Performed by: OBSTETRICS & GYNECOLOGY

## 2019-11-07 RX ORDER — ESTRADIOL 0.1 MG/G
2 CREAM VAGINAL
Qty: 1 TUBE | Refills: 3 | Status: SHIPPED | OUTPATIENT
Start: 2019-11-07 | End: 2020-05-04 | Stop reason: ALTCHOICE

## 2019-11-07 ASSESSMENT — ENCOUNTER SYMPTOMS
NAUSEA: 0
WHEEZING: 0
ABDOMINAL PAIN: 0
CONSTIPATION: 0
COUGH: 0
DIARRHEA: 0
VOMITING: 0

## 2019-11-15 ENCOUNTER — HOSPITAL ENCOUNTER (OUTPATIENT)
Age: 39
Setting detail: SPECIMEN
Discharge: HOME | End: 2019-11-15
Payer: COMMERCIAL

## 2019-11-15 DIAGNOSIS — N91.1 SECONDARY AMENORRHEA: ICD-10-CM

## 2019-11-20 LAB
21 HYDROXYLASE AB: <0.2 U/ML (ref 0–1)
FRAG X METHYLA PATRN: NORMAL
FRAGILE X ALLELE 1: 33 CGG REPEATS
FRAGILE X ALLELE 2: 32 CGG REPEATS
FRAGILE X INTERPRETATION: NORMAL
FRAGILE X SOURCE: NORMAL

## 2019-11-22 LAB — CHROMOSOME STUDY: NORMAL

## 2019-12-10 DIAGNOSIS — N39.41 URGE INCONTINENCE: ICD-10-CM

## 2019-12-10 RX ORDER — TROSPIUM CHLORIDE 20 MG/1
TABLET, FILM COATED ORAL
Qty: 180 TABLET | Refills: 3 | Status: SHIPPED | OUTPATIENT
Start: 2019-12-10 | End: 2020-08-25 | Stop reason: ALTCHOICE

## 2020-01-08 ENCOUNTER — OFFICE VISIT (OUTPATIENT)
Dept: FAMILY MEDICINE CLINIC | Age: 40
End: 2020-01-08
Payer: COMMERCIAL

## 2020-01-08 VITALS
HEIGHT: 67 IN | OXYGEN SATURATION: 98 % | WEIGHT: 210 LBS | BODY MASS INDEX: 32.96 KG/M2 | DIASTOLIC BLOOD PRESSURE: 74 MMHG | SYSTOLIC BLOOD PRESSURE: 120 MMHG | HEART RATE: 92 BPM

## 2020-01-08 PROCEDURE — 99213 OFFICE O/P EST LOW 20 MIN: CPT | Performed by: PHYSICIAN ASSISTANT

## 2020-01-08 ASSESSMENT — PATIENT HEALTH QUESTIONNAIRE - PHQ9
1. LITTLE INTEREST OR PLEASURE IN DOING THINGS: 0
SUM OF ALL RESPONSES TO PHQ QUESTIONS 1-9: 0
SUM OF ALL RESPONSES TO PHQ QUESTIONS 1-9: 0
2. FEELING DOWN, DEPRESSED OR HOPELESS: 0
SUM OF ALL RESPONSES TO PHQ9 QUESTIONS 1 & 2: 0

## 2020-01-08 ASSESSMENT — ENCOUNTER SYMPTOMS
BACK PAIN: 1
COLOR CHANGE: 0

## 2020-01-08 NOTE — PROGRESS NOTES
60 g 0    vitamin B-12 (CYANOCOBALAMIN) 1000 MCG tablet Take 1,000 mcg by mouth daily      Multiple Vitamins-Minerals (THERAPEUTIC MULTIVITAMIN-MINERALS) tablet Take 1 tablet by mouth daily      diphenhydrAMINE (BENADRYL) 25 MG tablet Take 25 mg by mouth every 6 hours as needed for Itching      Sennosides-Docusate Sodium (STOOL SOFTENER LAXATIVE PO) Take 1 tablet by mouth 2 times daily as needed       Lactobacillus (PROBIOTIC ACIDOPHILUS PO) Take 1 tablet by mouth daily       No current facility-administered medications for this visit. No Known Allergies    Health Maintenance   Topic Date Due    Varicella Vaccine (1 of 2 - 2-dose childhood series) 03/22/1982    Flu vaccine (1) 09/01/2019    Cervical cancer screen  10/11/2022    DTaP/Tdap/Td vaccine (2 - Td) 08/28/2028    HIV screen  Completed    Pneumococcal 0-64 years Vaccine  Aged Out       Subjective:     Review of Systems   Constitutional: Negative for activity change, appetite change, fatigue, fever and unexpected weight change. Musculoskeletal: Positive for back pain (chronic). Skin: Negative for color change, pallor, rash and wound. Neurological: Negative for weakness and numbness. Hematological: Negative for adenopathy. Psychiatric/Behavioral: Negative for sleep disturbance. The patient is not nervous/anxious. Objective:     Physical Exam  Vitals signs and nursing note reviewed. Constitutional:       General: She is not in acute distress. Appearance: Normal appearance. She is well-developed. She is not ill-appearing. HENT:      Head: Normocephalic and atraumatic. Musculoskeletal:      Comments: Trial external pain stimulator in place and on patient. Skin:     General: Skin is warm and dry. Coloration: Skin is not pale. Findings: No erythema or rash. Neurological:      Mental Status: She is alert and oriented to person, place, and time.    Psychiatric:         Behavior: Behavior normal.         Thought

## 2020-01-15 ENCOUNTER — TELEPHONE (OUTPATIENT)
Dept: OBGYN CLINIC | Age: 40
End: 2020-01-15

## 2020-01-15 NOTE — TELEPHONE ENCOUNTER
Notified pt of situation with lab. Luisito Starr stated they think her specimen most likely wasn't tappered with but they want to make sure and repeat Fragile X DNA Probe free of charge. Pt verbalizes understanding and will go go [facility]. Faxing orders.

## 2020-01-16 ENCOUNTER — PATIENT MESSAGE (OUTPATIENT)
Dept: FAMILY MEDICINE CLINIC | Age: 40
End: 2020-01-16

## 2020-01-16 ENCOUNTER — HOSPITAL ENCOUNTER (OUTPATIENT)
Age: 40
Setting detail: SPECIMEN
Discharge: HOME | End: 2020-01-16
Payer: COMMERCIAL

## 2020-01-16 NOTE — TELEPHONE ENCOUNTER
From: Jose Carlos Alexander  To: Nimo Franklin PA-C  Sent: 1/16/2020 2:04 AM EST  Subject: Prescription Question    Hello! So I was wondering if you would mind putting me back on that weight loss medicine that I took through a daily injection. I stopped taking it previously because as I increased the dosage it was too strong and had no appetite at all. Whereas this time once I feel it suppressing my appetite, I will just stop increasing it.

## 2020-01-26 LAB
FRAG X METHYLA PATRN: NORMAL
FRAGILE X ALLELE 1: 33 CGG REPEATS
FRAGILE X ALLELE 2: 32 CGG REPEATS
FRAGILE X INTERPRETATION: NORMAL
FRAGILE X SOURCE: NORMAL

## 2020-02-10 ENCOUNTER — OFFICE VISIT (OUTPATIENT)
Dept: OBGYN CLINIC | Age: 40
End: 2020-02-10
Payer: COMMERCIAL

## 2020-02-10 VITALS
WEIGHT: 203 LBS | SYSTOLIC BLOOD PRESSURE: 112 MMHG | BODY MASS INDEX: 31.86 KG/M2 | HEIGHT: 67 IN | DIASTOLIC BLOOD PRESSURE: 78 MMHG

## 2020-02-10 PROCEDURE — 99213 OFFICE O/P EST LOW 20 MIN: CPT | Performed by: OBSTETRICS & GYNECOLOGY

## 2020-02-10 ASSESSMENT — ENCOUNTER SYMPTOMS
WHEEZING: 0
DIARRHEA: 0
VOMITING: 0
COUGH: 0
CONSTIPATION: 0
NAUSEA: 0
ABDOMINAL PAIN: 0

## 2020-02-10 NOTE — PROGRESS NOTES
[facility], [address] [place] [address]  DATE OF VISIT:  2/10/20    Jose Carlos Alexander    :  1980  CHIEF COMPLAINT:    Chief Complaint   Patient presents with    Medication Check     Stopped the cream, says it was intensifying her hot flashes, OCP doing well, no periods since December other than a day of spotting. Complaints of low libido and vaginal dryness. HPI :   Jose Carlos Alexander is a 46 y.o. female here for follow up after starting OCPs. She was initially seen 19 as a new patient after being diagnosed with premature ovarian insufficiency, confirmed with [address] draws. Karyotype and Fragile X negative. She's doing well on OCPs, hot flushes have lessened. She feels the vaginal estrogen cream actually worsened her hot flushes and found it messy to use, so has stopped this. However, she continues to have vaginal dryness and now is having difficulty with orgasm. Past Medical History:   Diagnosis Date    ADD (attention deficit disorder)     Anxiety     Back pain     Carpal tunnel syndrome, bilateral 2016    Chronic back pain     started pain mgmnt     Depression     Foot pain     left (d/t back)    GERD (gastroesophageal reflux disease)     \"mild\"  no medications    Hx of blood clots     right leg superficial blood clot    Hyperlipidemia     watching diet    Lumbago     OAB (overactive bladder) 2017    Obesity (BMI 30-39. 9)     Sciatica     Urge incontinence       Past Surgical History:   Procedure Laterality Date    BACK SURGERY  2016    Lumbar interbody fusion posterior, L5-S1    BACK SURGERY  2018    LUMBAR MICRO DISKECTOMY   L4-5    DILATION AND CURETTAGE OF UTERUS      LUMBAR FUSION  2018    OTHER SURGICAL HISTORY  2018    myelogram    [place] LAMINECTOMY,>2 SGMT,LUMBAR N/A 2018    LUMBAR MICRO DISKECTOMY   L45 performed by Ben Oro MD at [address] INTRBDY N/A 2018    LUMBAR Take 1 tablet by mouth daily      estradiol (ESTRACE) 0.1 MG/GM vaginal cream Place 2 g vaginally Twice a Week (Patient not taking: Reported on 2/10/2020) 1 Tube 3    amphetamine-dextroamphetamine (ADDERALL) 20 MG tablet Take 1 tablet by mouth 2 times daily for 30 days. 60 tablet 0    diphenhydrAMINE (BENADRYL) 25 MG tablet Take 25 mg by mouth every 6 hours as needed for Itching       No current facility-administered medications for this visit. Allergies:  Patient has no known allergies. Gynecologic History:  Patient's last menstrual period was 2019. Sexually Active: Yes  STD History: No      OB History    Para Term  AB Living   2 0 0 0 2 0   SAB TAB Ectopic Molar Multiple Live Births   2 0 0 0 0 0       Review of Systems   Constitutional: Negative for chills and fever. HENT: Negative for hearing loss. Respiratory: Negative for cough and wheezing. Cardiovascular: Negative for chest pain and palpitations. Gastrointestinal: Negative for abdominal pain, constipation, diarrhea, nausea and vomiting. Genitourinary: Negative for dysuria, frequency and urgency. Musculoskeletal: Negative for myalgias. Skin: Negative for rash. Neurological: Negative for dizziness, weakness and headaches. Hematological: Does not bruise/bleed easily. Psychiatric/Behavioral: Negative for suicidal ideas. /78 (Position: Sitting, Cuff Size: Medium Adult)   Ht 5' 7\" (1.702 m)   Wt 203 lb (92.1 kg)   LMP 2019   BMI 31.79 kg/m²     Physical Exam  Constitutional:       Appearance: She is well-developed. HENT:      Head: Normocephalic. Neck:      Musculoskeletal: Normal range of motion. Thyroid: No thyromegaly. Cardiovascular:      Rate and Rhythm: Normal rate and regular rhythm. Pulmonary:      Effort: Pulmonary effort is normal. No respiratory distress. Abdominal:      General: There is no distension. Palpations: Abdomen is soft. Tenderness:  There is no abdominal tenderness. Musculoskeletal: Normal range of motion. Skin:     General: Skin is warm and dry. Neurological:      Mental Status: She is alert and oriented to person, place, and time. Psychiatric:         Behavior: Behavior normal.         Thought Content: Thought content normal.         Judgment: Judgment normal.         ASSESSMENT:  Jennifer Rodriguez is a 46 y.o. female      ICD-10-CM    1. Secondary amenorrhea N91.1    2. Encounter for surveillance of contraceptive pills Z30.41    3. Premature ovarian insufficiency E28.8        PLAN:    Tolerating OCP well. Advised continued use not only for hot flushes, but for bone density and cardiovascular health. Will trial estring for vaginal dryness. Glider[employer]io Howie given for low libido. Also recommended OTC water based lubricant with intercourse and aquaphor nightly.      Electronically signed by Benedicto Edwards MD on 2/10/2020 at 1:54 PM

## 2020-02-10 NOTE — Clinical Note
We had discussed Addyi (for sexual desire) but this is actually only approved for PRE menopausal women. Age appropriate

## 2020-02-11 NOTE — TELEPHONE ENCOUNTER
45 y/o Pt calling to say that her Estring was at pharmacy but Addyi was not. Pt calling to find out if she can get that filled? Pharmacy:  [facility]

## 2020-02-11 NOTE — TELEPHONE ENCOUNTER
Notified pt that she was notified of [facility] f/u message. Pt states she thought she fell under perimenopausal category but would be willing to try anything.

## 2020-02-13 NOTE — TELEPHONE ENCOUNTER
Pt left message on nurse voicemail f/u if there was another medication that Ilsa Madison can start her on since she is not a candidate for Addyi?

## 2020-02-14 ENCOUNTER — TELEPHONE (OUTPATIENT)
Dept: OBGYN CLINIC | Age: 40
End: 2020-02-14

## 2020-02-17 ENCOUNTER — HOSPITAL ENCOUNTER (OUTPATIENT)
Age: 40
Setting detail: SPECIMEN
Discharge: HOME | End: 2020-02-17
Payer: COMMERCIAL

## 2020-02-17 LAB
CORTISOL COLLECTION INFO: NORMAL
CORTISOL: 17.9 UG/DL (ref 2.7–18.4)
ESTRADIOL LEVEL: 23 PG/ML (ref 27–314)
TESTOSTERONE TOTAL: 18 NG/DL (ref 20–70)
VITAMIN D 25-HYDROXY: 31.7 NG/ML (ref 30–100)

## 2020-02-18 LAB — DHEAS (DHEA SULFATE): 118 UG/DL (ref 45–270)

## 2020-02-20 LAB — ESTRONE: 34.9 PG/ML

## 2020-02-21 ENCOUNTER — TELEPHONE (OUTPATIENT)
Dept: OBGYN CLINIC | Age: 40
End: 2020-02-21

## 2020-02-21 NOTE — TELEPHONE ENCOUNTER
Pt LMOR for nurse she needed her labs faxed over to [facility]. Called pt back to let her know that her results have been faxed.

## 2020-03-02 ENCOUNTER — TELEPHONE (OUTPATIENT)
Dept: OBGYN CLINIC | Age: 40
End: 2020-03-02

## 2020-03-02 NOTE — TELEPHONE ENCOUNTER
47 y/o Pt asking if the symptoms she is having could be related to [address]. Last week Tuesday 02/25 difficulty sleeping, gassy, burping, tasted like sulfur, stomach pain from beneath Umb to low in pelvis.  , \"like if you were feeling constipated\"

## 2020-03-06 ENCOUNTER — TELEPHONE (OUTPATIENT)
Dept: OBGYN CLINIC | Age: 40
End: 2020-03-06

## 2020-03-06 NOTE — TELEPHONE ENCOUNTER
45 y/o Pt states she went to [facility], had 2 hr appt , and got labs done. Medication request was sent and pharmacist said medication recommended by [facility] was denied by [facility]. Pt states she is confused. Pt really would like to try this. Pt states she had previously taken testosterone because it was recommended by her PCP due to low levels. It had helped her cycles. Pt states she is willing to try anything that would make her feel better. Pt states she had also previously talked to Mansi Skinner over the phone he recommended she remove her Estring because of side effects. He had recommended a medication he said was, Buddie Clement shaped tab that get place in vaginal vault, envicta (sp). Pt unsure what name of medication  was talking about.

## 2020-03-09 ENCOUNTER — PATIENT MESSAGE (OUTPATIENT)
Dept: FAMILY MEDICINE CLINIC | Age: 40
End: 2020-03-09

## 2020-03-09 ENCOUNTER — APPOINTMENT (OUTPATIENT)
Dept: CT IMAGING | Age: 40
End: 2020-03-09
Payer: COMMERCIAL

## 2020-03-09 ENCOUNTER — HOSPITAL ENCOUNTER (EMERGENCY)
Age: 40
Discharge: HOME | End: 2020-03-09
Attending: EMERGENCY MEDICINE
Payer: COMMERCIAL

## 2020-03-09 VITALS
RESPIRATION RATE: 16 BRPM | BODY MASS INDEX: 30.54 KG/M2 | OXYGEN SATURATION: 95 % | TEMPERATURE: 98.1 F | SYSTOLIC BLOOD PRESSURE: 93 MMHG | WEIGHT: 195 LBS | DIASTOLIC BLOOD PRESSURE: 56 MMHG | HEART RATE: 81 BPM

## 2020-03-09 LAB
-: ABNORMAL
ABSOLUTE EOS #: 0.33 K/UL (ref 0–0.44)
ABSOLUTE IMMATURE GRANULOCYTE: 0.04 K/UL (ref 0–0.3)
ABSOLUTE LYMPH #: 2.45 K/UL (ref 1.1–3.7)
ABSOLUTE MONO #: 0.59 K/UL (ref 0.1–1.2)
ALBUMIN SERPL-MCNC: 4 G/DL (ref 3.5–5.2)
ALBUMIN/GLOBULIN RATIO: ABNORMAL (ref 1–2.5)
ALP BLD-CCNC: 61 U/L (ref 35–104)
ALT SERPL-CCNC: 19 U/L (ref 5–33)
AMORPHOUS: ABNORMAL
ANION GAP SERPL CALCULATED.3IONS-SCNC: 12 MMOL/L (ref 9–17)
AST SERPL-CCNC: 17 U/L
BACTERIA: ABNORMAL
BASOPHILS # BLD: 0 % (ref 0–2)
BASOPHILS ABSOLUTE: 0.03 K/UL (ref 0–0.2)
BILIRUB SERPL-MCNC: 0.48 MG/DL (ref 0.3–1.2)
BILIRUBIN URINE: NEGATIVE
BUN BLDV-MCNC: 14 MG/DL (ref 6–20)
BUN/CREAT BLD: 21 (ref 9–20)
CALCIUM SERPL-MCNC: 9 MG/DL (ref 8.6–10.4)
CASTS UA: ABNORMAL /LPF
CHLORIDE BLD-SCNC: 102 MMOL/L (ref 98–107)
CO2: 22 MMOL/L (ref 20–31)
COLOR: YELLOW
COMMENT UA: ABNORMAL
CREAT SERPL-MCNC: 0.68 MG/DL (ref 0.5–0.9)
CRYSTALS, UA: ABNORMAL /HPF
DIFFERENTIAL TYPE: ABNORMAL
EOSINOPHILS RELATIVE PERCENT: 3 % (ref 1–4)
EPITHELIAL CELLS UA: ABNORMAL /HPF (ref 0–5)
GFR AFRICAN AMERICAN: >60 ML/MIN
GFR NON-AFRICAN AMERICAN: >60 ML/MIN
GFR SERPL CREATININE-BSD FRML MDRD: ABNORMAL ML/MIN/{1.73_M2}
GFR SERPL CREATININE-BSD FRML MDRD: ABNORMAL ML/MIN/{1.73_M2}
GLUCOSE BLD-MCNC: 99 MG/DL (ref 70–99)
GLUCOSE URINE: NEGATIVE
HCG, PREGNANCY URINE (POC): NEGATIVE
HCT VFR BLD CALC: 41.7 % (ref 36.3–47.1)
HEMOGLOBIN: 13.9 G/DL (ref 11.9–15.1)
IMMATURE GRANULOCYTES: 0 %
KETONES, URINE: NEGATIVE
LEUKOCYTE ESTERASE, URINE: NEGATIVE
LIPASE: 36 U/L (ref 13–60)
LYMPHOCYTES # BLD: 24 % (ref 24–43)
MAGNESIUM: 1.8 MG/DL (ref 1.6–2.6)
MCH RBC QN AUTO: 30.1 PG (ref 25.2–33.5)
MCHC RBC AUTO-ENTMCNC: 33.3 G/DL (ref 28.4–34.8)
MCV RBC AUTO: 90.3 FL (ref 82.6–102.9)
MONOCYTES # BLD: 6 % (ref 3–12)
MUCUS: ABNORMAL
NITRITE, URINE: NEGATIVE
NRBC AUTOMATED: 0 PER 100 WBC
OTHER OBSERVATIONS UA: ABNORMAL
PDW BLD-RTO: 12.3 % (ref 11.8–14.4)
PH UA: 6 (ref 5–8)
PLATELET # BLD: 238 K/UL (ref 138–453)
PLATELET ESTIMATE: ABNORMAL
PMV BLD AUTO: 10.3 FL (ref 8.1–13.5)
POTASSIUM SERPL-SCNC: 3.4 MMOL/L (ref 3.7–5.3)
PREGNANCY TEST URINE, POC: NEGATIVE
PROTEIN UA: NEGATIVE
RBC # BLD: 4.62 M/UL (ref 3.95–5.11)
RBC # BLD: ABNORMAL 10*6/UL
RBC UA: ABNORMAL /HPF (ref 0–2)
RENAL EPITHELIAL, UA: ABNORMAL /HPF
SEG NEUTROPHILS: 67 % (ref 36–65)
SEGMENTED NEUTROPHILS ABSOLUTE COUNT: 6.9 K/UL (ref 1.5–8.1)
SODIUM BLD-SCNC: 136 MMOL/L (ref 135–144)
SPECIFIC GRAVITY UA: 1.02 (ref 1–1.03)
TOTAL PROTEIN: 6.9 G/DL (ref 6.4–8.3)
TRICHOMONAS: ABNORMAL
TURBIDITY: ABNORMAL
URINE HGB: NEGATIVE
UROBILINOGEN, URINE: NORMAL
WBC # BLD: 10.3 K/UL (ref 3.5–11.3)
WBC # BLD: ABNORMAL 10*3/UL
WBC UA: ABNORMAL /HPF (ref 0–5)
YEAST: ABNORMAL

## 2020-03-09 PROCEDURE — 99284 EMERGENCY DEPT VISIT MOD MDM: CPT

## 2020-03-09 PROCEDURE — 81025 URINE PREGNANCY TEST: CPT

## 2020-03-09 PROCEDURE — 74177 CT ABD & PELVIS W/CONTRAST: CPT

## 2020-03-09 PROCEDURE — 6360000002 HC RX W HCPCS: Performed by: EMERGENCY MEDICINE

## 2020-03-09 PROCEDURE — 83690 ASSAY OF LIPASE: CPT

## 2020-03-09 PROCEDURE — 81001 URINALYSIS AUTO W/SCOPE: CPT

## 2020-03-09 PROCEDURE — 80053 COMPREHEN METABOLIC PANEL: CPT

## 2020-03-09 PROCEDURE — 96375 TX/PRO/DX INJ NEW DRUG ADDON: CPT

## 2020-03-09 PROCEDURE — 85025 COMPLETE CBC W/AUTO DIFF WBC: CPT

## 2020-03-09 PROCEDURE — 36415 COLL VENOUS BLD VENIPUNCTURE: CPT

## 2020-03-09 PROCEDURE — 6360000004 HC RX CONTRAST MEDICATION: Performed by: EMERGENCY MEDICINE

## 2020-03-09 PROCEDURE — 96374 THER/PROPH/DIAG INJ IV PUSH: CPT

## 2020-03-09 PROCEDURE — 2580000003 HC RX 258: Performed by: EMERGENCY MEDICINE

## 2020-03-09 PROCEDURE — 83735 ASSAY OF MAGNESIUM: CPT

## 2020-03-09 RX ORDER — SODIUM CHLORIDE 0.9 % (FLUSH) 0.9 %
10 SYRINGE (ML) INJECTION PRN
Status: DISCONTINUED | OUTPATIENT
Start: 2020-03-09 | End: 2020-03-09 | Stop reason: HOSPADM

## 2020-03-09 RX ORDER — 0.9 % SODIUM CHLORIDE 0.9 %
80 INTRAVENOUS SOLUTION INTRAVENOUS ONCE
Status: COMPLETED | OUTPATIENT
Start: 2020-03-09 | End: 2020-03-09

## 2020-03-09 RX ORDER — 0.9 % SODIUM CHLORIDE 0.9 %
1000 INTRAVENOUS SOLUTION INTRAVENOUS ONCE
Status: COMPLETED | OUTPATIENT
Start: 2020-03-09 | End: 2020-03-09

## 2020-03-09 RX ORDER — MORPHINE SULFATE 2 MG/ML
2 INJECTION, SOLUTION INTRAMUSCULAR; INTRAVENOUS ONCE
Status: COMPLETED | OUTPATIENT
Start: 2020-03-09 | End: 2020-03-09

## 2020-03-09 RX ORDER — ONDANSETRON 2 MG/ML
4 INJECTION INTRAMUSCULAR; INTRAVENOUS ONCE
Status: COMPLETED | OUTPATIENT
Start: 2020-03-09 | End: 2020-03-09

## 2020-03-09 RX ADMIN — ONDANSETRON 4 MG: 2 INJECTION INTRAMUSCULAR; INTRAVENOUS at 04:05

## 2020-03-09 RX ADMIN — SODIUM CHLORIDE 1000 ML: 9 INJECTION, SOLUTION INTRAVENOUS at 04:05

## 2020-03-09 RX ADMIN — IOPAMIDOL 75 ML: 755 INJECTION, SOLUTION INTRAVENOUS at 05:06

## 2020-03-09 RX ADMIN — SODIUM CHLORIDE 80 ML: 9 INJECTION, SOLUTION INTRAVENOUS at 05:06

## 2020-03-09 RX ADMIN — MORPHINE SULFATE 2 MG: 2 INJECTION, SOLUTION INTRAMUSCULAR; INTRAVENOUS at 04:05

## 2020-03-09 RX ADMIN — Medication 10 ML: at 05:06

## 2020-03-09 ASSESSMENT — PAIN SCALES - GENERAL
PAINLEVEL_OUTOF10: 8
PAINLEVEL_OUTOF10: 0
PAINLEVEL_OUTOF10: 8

## 2020-03-09 ASSESSMENT — PAIN DESCRIPTION - DESCRIPTORS: DESCRIPTORS: STABBING;SHARP

## 2020-03-09 ASSESSMENT — PAIN DESCRIPTION - LOCATION: LOCATION: ABDOMEN

## 2020-03-09 ASSESSMENT — PAIN DESCRIPTION - FREQUENCY: FREQUENCY: INTERMITTENT

## 2020-03-09 ASSESSMENT — PAIN DESCRIPTION - PAIN TYPE: TYPE: ACUTE PAIN

## 2020-03-09 NOTE — ED PROVIDER NOTES
Vitamins-Minerals (THERAPEUTIC MULTIVITAMIN-MINERALS) tablet Take 1 tablet by mouth dailyHistorical Med      diphenhydrAMINE (BENADRYL) 25 MG tablet Take 25 mg by mouth every 6 hours as needed for ItchingHistorical Med      Sennosides-Docusate Sodium (STOOL SOFTENER LAXATIVE PO) Take 1 tablet by mouth 2 times daily as needed Historical Med      Lactobacillus (PROBIOTIC ACIDOPHILUS PO) Take 1 tablet by mouth daily           ALLERGIES     has No Known Allergies. FAMILY HISTORY     She indicated that her mother is alive. She indicated that her father is alive. She indicated that her brother is alive. She indicated that the status of her neg hx is unknown. SOCIAL HISTORY       Social History     Tobacco Use    Smoking status: Former Smoker     Packs/day: 1.00     Years: 15.00     Pack years: 15.00     Types: Cigarettes     Last attempt to quit: 7/13/2016     Years since quitting: 3.6    Smokeless tobacco: Never Used   Substance Use Topics    Alcohol use: Yes     Types: 1 Glasses of wine per week     Comment: Maybe a couple drinks a month    Drug use: Yes     Types: Marijuana     Comment: for pain and sleep  \"occasionally\"     PHYSICAL EXAM     INITIAL VITALS: BP (!) 93/56   Pulse 81   Temp 98.1 °F (36.7 °C)   Resp 16   Wt 195 lb (88.5 kg)   LMP 02/11/2020 (Exact Date)   SpO2 95%   BMI 30.54 kg/m²    Physical Exam  Constitutional:       Appearance: Normal appearance. HENT:      Head: Normocephalic. Right Ear: Tympanic membrane and external ear normal.      Left Ear: Tympanic membrane and external ear normal.      Nose: No congestion or rhinorrhea. Mouth/Throat:      Mouth: Mucous membranes are moist.   Eyes:      Extraocular Movements: Extraocular movements intact. Conjunctiva/sclera: Conjunctivae normal.      Pupils: Pupils are equal, round, and reactive to light. Neck:      Musculoskeletal: Normal range of motion. Cardiovascular:      Rate and Rhythm: Normal rate and regular rhythm. Pulses: Normal pulses. Heart sounds: Normal heart sounds. No murmur. No friction rub. Pulmonary:      Effort: Pulmonary effort is normal. No respiratory distress. Breath sounds: No stridor. No wheezing or rhonchi. Abdominal:      General: Abdomen is flat. Bowel sounds are normal. There is no distension. Palpations: Abdomen is soft. Tenderness: There is abdominal tenderness. There is no guarding or rebound. Comments: Mild generalized abdominal tenderness. Negative Davis sign. Musculoskeletal:         General: No tenderness. Skin:     General: Skin is dry. Coloration: Skin is not jaundiced. Findings: No lesion or rash. Neurological:      General: No focal deficit present. Mental Status: She is alert and oriented to person, place, and time. Mental status is at baseline. Psychiatric:         Mood and Affect: Mood normal.         Behavior: Behavior normal.         Judgment: Judgment normal.         MEDICAL DECISION MAKING:   The patient is hemodynamically stable, afebrile, nontoxic-appearing. Physical exam notable for generalized abdominal pain. Based on history and exam differential includes diverticulosis, constipation, gastroenteritis. Low suspicion for acute surgical abdomen. ED plan: Basic labs, UA, CT abdomen pelvis, reassess. ED Course as of Mar 09 0632   Mon Mar 09, 2020   0610 The patient is hemodynamically stable, afebrile, nontoxic-appearing. Labs remarkable for:    UA negative for infection. WBC 10.3    Creatinine 0.68    Potassium 3.4    CT abdomen markable for multiple hypodensities 3mm small to characterize. However liver enzymes normal.  Also abdominal pain lower abdominal region as opposed to hepatobiliary area. [PHYLLIS]   0112 Unclear etiology of patient's symptoms. Patient does admit to being a \"poor eater\". She has had issues with constipation before and will add fiber to diet and stool softener.     [PHYLLIS]   [id number] I did URINALYSIS - Abnormal; Notable for the following components:    Bacteria, UA RARE (*)     All other components within normal limits   POCT URINE PREGNANCY - Normal   LIPASE   MAGNESIUM       EMERGENCY DEPARTMENTCOURSE:         Vitals:    Vitals:    03/09/20 0332 03/09/20 0442 03/09/20 0611   BP: 121/70 (!) 122/50 (!) 93/56   Pulse: 101 79 81   Resp: 16 16 16   Temp: 97.9 °F (36.6 °C) 97.9 °F (36.6 °C) 98.1 °F (36.7 °C)   TempSrc: Oral     SpO2: 100% 100% 95%   Weight: 195 lb (88.5 kg)         The patient was given the following medications while in the emergency department:  Orders Placed This Encounter   Medications    0.9 % sodium chloride bolus    ondansetron (ZOFRAN) injection 4 mg    morphine (PF) injection 2 mg    0.9 % sodium chloride bolus    sodium chloride flush 0.9 % injection 10 mL    iopamidol (ISOVUE-370) 76 % injection 75 mL     CONSULTS:  None    FINAL IMPRESSION      1. Lower abdominal pain          DISPOSITION/PLAN   DISPOSITION Decision To Discharge 03/09/2020 06:07:55 AM      PATIENT REFERRED TO:  [facility] [address], [facility]  [address]   [address] [place] [address]  [phone]    In 2 days      DISCHARGE MEDICATIONS:  Discharge Medication List as of 3/9/2020  6:10 AM        Tierney Hobbs MD  Attending Emergency Physician                    Vesta Denise MD  03/09/20 9804

## 2020-03-09 NOTE — TELEPHONE ENCOUNTER
From: Bro Suarez  To: Bryce Hsu PA-C  Sent: 3/9/2020 7:11 AM EDT  Subject: Non-Urgent Medical Question    I just got home from [facility] ER for severe abdominal pain and nausea that I've been experiencing on and off for 2 weeks.  The doctor couldn't find anything through labs and a CT scan, so he recommended I see a GI specialist. Do you have any in particular that you would recommend or prefer to refer me to?

## 2020-03-25 PROBLEM — F41.9 ANXIETY: Status: RESOLVED | Noted: 2020-03-25 | Resolved: 2020-03-24

## 2020-05-04 ENCOUNTER — TELEMEDICINE (OUTPATIENT)
Dept: OBGYN CLINIC | Age: 40
End: 2020-05-04
Payer: COMMERCIAL

## 2020-05-04 VITALS — BODY MASS INDEX: 30.54 KG/M2 | HEIGHT: 67 IN

## 2020-05-04 PROCEDURE — G8427 DOCREV CUR MEDS BY ELIG CLIN: HCPCS | Performed by: NURSE PRACTITIONER

## 2020-05-04 PROCEDURE — 99213 OFFICE O/P EST LOW 20 MIN: CPT | Performed by: NURSE PRACTITIONER

## 2020-05-04 ASSESSMENT — ENCOUNTER SYMPTOMS
ABDOMINAL DISTENTION: 0
COUGH: 0
ABDOMINAL PAIN: 0
CONSTIPATION: 0
DIARRHEA: 0
BACK PAIN: 0
SHORTNESS OF BREATH: 0

## 2020-05-04 NOTE — PROGRESS NOTES
[facility]  [facility]  [address]  [address]  Dept: [phone]  Dept Fax: [phone]  20  8:01 AM      TELEHEALTH PHONE VISIT    This visit was completed via telephone video due to the restrictions of the COVID-19 pandemic. All issues as below were discussed and addressed but no physical exam was performed. Reviewed that if it was felt that the patient should be evaluated in clinic then she would be directed there. The patient verbally consented to visit. Ramon Salazar is a 45 y.o. . She is the only one present on the line. Reports chief complaint of med review. Assessment/Plan  Pt calling for med review and FU. Determined to have POI through documented lab results. Has been on OCP's. Last seen by Dr. Dyan Davis and estrace PV changed to vag ring- pt thought cream was \"messy\". Had been having regular cycles until about 2-3 years ago. Was on OCP's and didn't feel like it was helping. Went to [employer] for compounding for about 3 months. Has been on Imvvexy and feels well and happy with results so far. Has not had vaginal dryness and has been able to orgasm. Happy with current POC. Will call to schedule annual exam and contact office with and changes or concerns. Review of Systems   Constitutional: Negative for appetite change and fatigue. HENT: Negative for congestion and hearing loss. Eyes: Negative for visual disturbance. Respiratory: Negative for cough and shortness of breath. Cardiovascular: Negative for chest pain and palpitations. Gastrointestinal: Negative for abdominal distention, abdominal pain, constipation and diarrhea. Genitourinary: Positive for dyspareunia (libido and vaginal dryness improving). Negative for flank pain, frequency, menstrual problem, pelvic pain and vaginal discharge. Musculoskeletal: Negative for back pain. Neurological: Negative for syncope and headaches.    Psychiatric/Behavioral: Negative for behavioral problems. Physical Exam  Constitutional:       Appearance: Normal appearance. She is normal weight. HENT:      Head: Normocephalic. Nose: Nose normal.      Mouth/Throat:      Mouth: Mucous membranes are moist.   Eyes:      Extraocular Movements: Extraocular movements intact. Conjunctiva/sclera: Conjunctivae normal.   Pulmonary:      Effort: Pulmonary effort is normal.   Musculoskeletal: Normal range of motion. Skin:     General: Skin is dry. Neurological:      General: No focal deficit present. Mental Status: She is alert and oriented to person, place, and time. Mental status is at baseline. Psychiatric:         Mood and Affect: Mood normal.         Behavior: Behavior normal.         Thought Content: Thought content normal.         Judgment: Judgment normal.           All questions answered and patient vocalized understanding. She is grateful that she did not have to leave her home quarantine for this visit. Spent 12 minutes with patient on phone discussing health concerns and 100% of the time was spent in counseling and coordination of care. SHREE Marie - CNP  [address] were provided through a video synchronous discussion virtually to substitute for in-person clinic visit. Patient and provider were located at their individual homes.

## 2020-05-06 RX ORDER — LIRAGLUTIDE 6 MG/ML
INJECTION, SOLUTION SUBCUTANEOUS
Qty: 15 ML | Refills: 0 | Status: SHIPPED | OUTPATIENT
Start: 2020-05-06 | End: 2020-08-25 | Stop reason: ALTCHOICE

## 2020-05-08 ENCOUNTER — TELEPHONE (OUTPATIENT)
Dept: FAMILY MEDICINE CLINIC | Age: 40
End: 2020-05-08

## 2020-05-18 ENCOUNTER — HOSPITAL ENCOUNTER (OUTPATIENT)
Age: 40
Setting detail: SPECIMEN
Discharge: HOME | End: 2020-05-18
Payer: COMMERCIAL

## 2020-05-21 LAB — SURGICAL PATHOLOGY REPORT: NORMAL

## 2020-05-26 ENCOUNTER — HOSPITAL ENCOUNTER (OUTPATIENT)
Dept: PREADMISSION TESTING | Age: 40
Discharge: HOME | End: 2020-05-30
Payer: COMMERCIAL

## 2020-05-26 PROCEDURE — U0004 COV-19 TEST NON-CDC HGH THRU: HCPCS

## 2020-05-27 LAB
SARS-COV-2, PCR: NOT DETECTED
SARS-COV-2, RAPID: NORMAL
SARS-COV-2: NORMAL
SOURCE: NORMAL

## 2020-05-27 PROCEDURE — 81025 URINE PREGNANCY TEST: CPT

## 2020-05-28 ENCOUNTER — ANESTHESIA (OUTPATIENT)
Dept: ENDOSCOPY | Age: 40
End: 2020-05-28
Payer: COMMERCIAL

## 2020-05-28 ENCOUNTER — HOSPITAL ENCOUNTER (OUTPATIENT)
Age: 40
Setting detail: OUTPATIENT SURGERY
Discharge: HOME | End: 2020-05-28
Attending: INTERNAL MEDICINE | Admitting: INTERNAL MEDICINE
Payer: COMMERCIAL

## 2020-05-28 ENCOUNTER — ANESTHESIA EVENT (OUTPATIENT)
Dept: ENDOSCOPY | Age: 40
End: 2020-05-28
Payer: COMMERCIAL

## 2020-05-28 VITALS
HEIGHT: 67 IN | RESPIRATION RATE: 18 BRPM | OXYGEN SATURATION: 100 % | DIASTOLIC BLOOD PRESSURE: 60 MMHG | BODY MASS INDEX: 30.61 KG/M2 | TEMPERATURE: 97.2 F | SYSTOLIC BLOOD PRESSURE: 108 MMHG | WEIGHT: 195 LBS | HEART RATE: 74 BPM

## 2020-05-28 VITALS — SYSTOLIC BLOOD PRESSURE: 75 MMHG | OXYGEN SATURATION: 99 % | DIASTOLIC BLOOD PRESSURE: 42 MMHG

## 2020-05-28 LAB — HCG, PREGNANCY URINE (POC): NEGATIVE

## 2020-05-28 PROCEDURE — 6360000002 HC RX W HCPCS

## 2020-05-28 PROCEDURE — 3609013000 HC EGD TRANSORAL CONTROL BLEEDING ANY METHOD: Performed by: INTERNAL MEDICINE

## 2020-05-28 PROCEDURE — 7100000040 HC SPAR PHASE II RECOVERY - FIRST 15 MIN: Performed by: INTERNAL MEDICINE

## 2020-05-28 PROCEDURE — 3700000001 HC ADD 15 MINUTES (ANESTHESIA): Performed by: INTERNAL MEDICINE

## 2020-05-28 PROCEDURE — 2709999900 HC NON-CHARGEABLE SUPPLY: Performed by: INTERNAL MEDICINE

## 2020-05-28 PROCEDURE — 2500000003 HC RX 250 WO HCPCS

## 2020-05-28 PROCEDURE — 3609012400 HC EGD TRANSORAL BIOPSY SINGLE/MULTIPLE: Performed by: INTERNAL MEDICINE

## 2020-05-28 PROCEDURE — 2720000010 HC SURG SUPPLY STERILE: Performed by: INTERNAL MEDICINE

## 2020-05-28 PROCEDURE — 3700000000 HC ANESTHESIA ATTENDED CARE: Performed by: INTERNAL MEDICINE

## 2020-05-28 PROCEDURE — 88305 TISSUE EXAM BY PATHOLOGIST: CPT

## 2020-05-28 PROCEDURE — 2580000003 HC RX 258: Performed by: ANESTHESIOLOGY

## 2020-05-28 PROCEDURE — 7100000041 HC SPAR PHASE II RECOVERY - ADDTL 15 MIN: Performed by: INTERNAL MEDICINE

## 2020-05-28 RX ORDER — LIDOCAINE HYDROCHLORIDE 10 MG/ML
INJECTION, SOLUTION EPIDURAL; INFILTRATION; INTRACAUDAL; PERINEURAL PRN
Status: DISCONTINUED | OUTPATIENT
Start: 2020-05-28 | End: 2020-05-28 | Stop reason: SDUPTHER

## 2020-05-28 RX ORDER — PROPOFOL 10 MG/ML
INJECTION, EMULSION INTRAVENOUS PRN
Status: DISCONTINUED | OUTPATIENT
Start: 2020-05-28 | End: 2020-05-28 | Stop reason: SDUPTHER

## 2020-05-28 RX ORDER — SODIUM CHLORIDE, SODIUM LACTATE, POTASSIUM CHLORIDE, CALCIUM CHLORIDE 600; 310; 30; 20 MG/100ML; MG/100ML; MG/100ML; MG/100ML
INJECTION, SOLUTION INTRAVENOUS CONTINUOUS
Status: DISCONTINUED | OUTPATIENT
Start: 2020-05-28 | End: 2020-05-28 | Stop reason: HOSPADM

## 2020-05-28 RX ADMIN — PROPOFOL 20 MG: 10 INJECTION, EMULSION INTRAVENOUS at 10:01

## 2020-05-28 RX ADMIN — PROPOFOL 20 MG: 10 INJECTION, EMULSION INTRAVENOUS at 10:04

## 2020-05-28 RX ADMIN — LIDOCAINE HYDROCHLORIDE 50 MG: 10 INJECTION, SOLUTION EPIDURAL; INFILTRATION; INTRACAUDAL; PERINEURAL at 09:51

## 2020-05-28 RX ADMIN — PROPOFOL 20 MG: 10 INJECTION, EMULSION INTRAVENOUS at 10:09

## 2020-05-28 RX ADMIN — PROPOFOL 20 MG: 10 INJECTION, EMULSION INTRAVENOUS at 10:07

## 2020-05-28 RX ADMIN — PROPOFOL 100 MG: 10 INJECTION, EMULSION INTRAVENOUS at 09:51

## 2020-05-28 RX ADMIN — PROPOFOL 20 MG: 10 INJECTION, EMULSION INTRAVENOUS at 09:55

## 2020-05-28 RX ADMIN — PROPOFOL 50 MG: 10 INJECTION, EMULSION INTRAVENOUS at 09:57

## 2020-05-28 RX ADMIN — PROPOFOL 30 MG: 10 INJECTION, EMULSION INTRAVENOUS at 09:53

## 2020-05-28 RX ADMIN — SODIUM CHLORIDE, POTASSIUM CHLORIDE, SODIUM LACTATE AND CALCIUM CHLORIDE: 600; 310; 30; 20 INJECTION, SOLUTION INTRAVENOUS at 09:21

## 2020-05-28 RX ADMIN — PROPOFOL 20 MG: 10 INJECTION, EMULSION INTRAVENOUS at 09:59

## 2020-05-28 ASSESSMENT — PULMONARY FUNCTION TESTS
PIF_VALUE: 0
PIF_VALUE: 1
PIF_VALUE: 0
PIF_VALUE: 1
PIF_VALUE: 0
PIF_VALUE: 2
PIF_VALUE: 0
PIF_VALUE: 0
PIF_VALUE: 1
PIF_VALUE: 0
PIF_VALUE: 1
PIF_VALUE: 0

## 2020-05-28 ASSESSMENT — PAIN SCALES - GENERAL
PAINLEVEL_OUTOF10: 0

## 2020-05-28 ASSESSMENT — PAIN - FUNCTIONAL ASSESSMENT: PAIN_FUNCTIONAL_ASSESSMENT: 0-10

## 2020-05-28 NOTE — OP NOTE
EGD      Patient:   Staci Tobin    :    1980    Facility:   [facility]   Referring/PCP: Shannan Calhoun PA-C    Procedure:   Esophagogastroduodenoscopy --diagnostic, with cold forceps' polypectomy and APC  Date:     2020   Endoscopist:  Suraj Mercado MD     Indication:   Neuroendocrine tumor of the duodenum     Anesthesia:  MAC    Complications: None    Estimated blood loss: Minimal    Specimen collected: Yes    Description of Procedure:  Informed consent was obtained from the patient after explanation of the procedure including indications, description of the procedure,  benefits and possible risks and complications of the procedure, and alternatives. Questions were answered. The patient's history was reviewed and a directed physical examination was performed prior to the procedure. Patient was monitored throughout the procedure with pulse oximetry and periodic assessment of vital signs. Patient was sedated as noted above. With the patient in the left lateral decubitus position, the Olympus videoendoscope was placed in the patient's mouth and under direct visualization passed into the esophagus. Visualization of the esophagus, stomach, and duodenum was performed during both introduction and withdrawal of the endoscope and retroflexed view of the proximal stomach was obtained. The scope was passed to the 2nd portion of the duodenum. The patient tolerated the procedure well and was taken to the recovery area in good condition. Findings[de-identified]   Esophagus: normal.   Stomach: Small ulcer seen in the antrum at the prior biopsy site otherwise normal appearing stomach on direct and retroflexion view  Duodenum: Two small mucosal nodules were seen in the first part of the duodenum. Both nodules were removed with cold forcep's polypectomies. Post removal the area was treated with APC setting 60 aranda and Flow 1 liter.                              Normal second part of the duodenum

## 2020-05-28 NOTE — ANESTHESIA POSTPROCEDURE EVALUATION
Department of Anesthesiology  Postprocedure Note    Patient: Dallas Garrison  MRN: [id number]  YOB: 1980  Date of evaluation: 5/28/2020  Time:  1:29 PM     Procedure Summary     Date:  05/28/20 Room / Location:  [address] [place] [address]    Anesthesia Start:  4068 Anesthesia Stop:  1022    Procedures:       EGD BIOPSY **CASE IN OR. WITH G.I. STAFF**  EGD ESOPHAGOGASTRODUODENOSCOPY (N/A )      EGD CONTROL HEMORRHAGE. APC Diagnosis:  (NEUROENDOCRINE TUMOR)    Surgeon:  Rio Heredia MD Responsible Provider:  Marcus Adkins MD    Anesthesia Type:  MAC, TIVA ASA Status:  3          Anesthesia Type: MAC, TIVA    Reva Phase I:      Reva Phase II: Reva Score: 10    Last vitals: Reviewed and per EMR flowsheets.        Anesthesia Post Evaluation    Patient location during evaluation: PACU  Patient participation: complete - patient participated  Level of consciousness: awake and alert  Pain score: 1  Airway patency: patent  Nausea & Vomiting: no nausea and no vomiting  Complications: no  Cardiovascular status: hemodynamically stable  Respiratory status: room air  Hydration status: euvolemic

## 2020-05-28 NOTE — H&P
History and Physical    Pt Name: Staci Tobin  MRN: [id number]  YOB: 1980  Date of evaluation: 5/28/2020  Primary Care Physician: Shannan Calhoun PA-C    SUBJECTIVE:   History of Chief Complaint:    Staci Tobin is a 45 y.o. female who is scheduled today for EGD. She complains of having a recently discovered neuroendocrine tumor after having EGD and colonoscopy last Monday to evaluate abdominal pains she had been having after having negative imaging and labs. She currently denies abdominal pain. Allergies  has No Known Allergies. Medications  Prior to Admission medications    Medication Sig Start Date End Date Taking? Authorizing Provider   liraglutide-weight management (SAXENDA) 18 MG/3ML SOPN INJECT 0.6MG UNDER THE SKIN ONCE A WEEK FOR 1 WEEK THEN INCREASE BY 0.6MG WEEKLY THEREAFTER TO GOAL OF 3MG ONCE DAILY 5/6/20  Yes RAYNE GaleasC   trospium (SANCTURA) 20 MG tablet TAKE 1 TABLET TWICE A DAY 12/10/19  Yes Renee Valdez PA-C   amphetamine-dextroamphetamine (ADDERALL) 20 MG tablet Take 1 tablet by mouth 2 times daily for 30 days. 10/24/19 5/28/20 Yes Renee A ALLIE Valdez-C   fluticasone (FLONASE) 50 MCG/ACT nasal spray INHALE 2 SPRAYS NASALLY TWO TIMES A DAY  8/27/19  Yes Renee A PRASHANT Valdez   ibuprofen (ADVIL;MOTRIN) 800 MG tablet TAKE 1 TABLET BY MOUTH EVERY SIX HOURS WITH FOOD or milk 7/9/19  Yes RAYNE GaleasC   vitamin B-12 (CYANOCOBALAMIN) 1000 MCG tablet Take 1,000 mcg by mouth daily   Yes Historical Provider, MD   Multiple Vitamins-Minerals (THERAPEUTIC MULTIVITAMIN-MINERALS) tablet Take 1 tablet by mouth daily   Yes Historical Provider, MD   Lactobacillus (PROBIOTIC ACIDOPHILUS PO) Take 1 tablet by mouth daily   Yes Historical Provider, MD   triamcinolone (KENALOG) 0.1 % cream Apply topically 2 times daily.  8/28/18   ALLIE Galeas-C   diphenhydrAMINE (BENADRYL) 25 MG tablet Take 25 mg by mouth every 6 hours as needed for Itching    Historical Provider, MD APRN-CNP  Electronically signed 5/28/2020 at 9:51 AM

## 2020-05-29 LAB — SURGICAL PATHOLOGY REPORT: NORMAL

## 2020-06-04 ENCOUNTER — PATIENT MESSAGE (OUTPATIENT)
Dept: FAMILY MEDICINE CLINIC | Age: 40
End: 2020-06-04

## 2020-06-04 RX ORDER — TIZANIDINE 2 MG/1
2 TABLET ORAL 3 TIMES DAILY PRN
Qty: 30 TABLET | Refills: 0 | Status: SHIPPED | OUTPATIENT
Start: 2020-06-04 | End: 2020-06-30

## 2020-06-04 NOTE — TELEPHONE ENCOUNTER
From: Abi Price  [facility]  Sent: 6/4/2020 2:38 PM EDT  Subject: Prescription Question    Hello! I was wondering if you would be ok to refill my tizanidine 2 mg for me? I'm not seeing Dr. Rowdy Hwang anymore so I doubt he would refill it for me. I just started back to work Monday and my leg cramps were so bad last night that it was hard for me to fall asleep.

## 2020-06-09 ENCOUNTER — HOSPITAL ENCOUNTER (OUTPATIENT)
Age: 40
Setting detail: SPECIMEN
Discharge: HOME | End: 2020-06-09
Payer: COMMERCIAL

## 2020-06-09 LAB
ABSOLUTE EOS #: 0.15 K/UL (ref 0–0.44)
ABSOLUTE IMMATURE GRANULOCYTE: <0.03 K/UL (ref 0–0.3)
ABSOLUTE LYMPH #: 2.21 K/UL (ref 1.1–3.7)
ABSOLUTE MONO #: 0.56 K/UL (ref 0.1–1.2)
ALBUMIN SERPL-MCNC: 4.4 G/DL (ref 3.5–5.2)
ALBUMIN/GLOBULIN RATIO: 1.4 (ref 1–2.5)
ALP BLD-CCNC: 64 U/L (ref 35–104)
ALT SERPL-CCNC: 33 U/L (ref 5–33)
ANION GAP SERPL CALCULATED.3IONS-SCNC: 19 MMOL/L (ref 9–17)
AST SERPL-CCNC: 24 U/L
BASOPHILS # BLD: 1 % (ref 0–2)
BASOPHILS ABSOLUTE: 0.04 K/UL (ref 0–0.2)
BILIRUB SERPL-MCNC: 0.62 MG/DL (ref 0.3–1.2)
BUN BLDV-MCNC: 13 MG/DL (ref 6–20)
BUN/CREAT BLD: ABNORMAL (ref 9–20)
CALCIUM SERPL-MCNC: 9.8 MG/DL (ref 8.6–10.4)
CHLORIDE BLD-SCNC: 102 MMOL/L (ref 98–107)
CO2: 20 MMOL/L (ref 20–31)
CREAT SERPL-MCNC: 0.55 MG/DL (ref 0.5–0.9)
DIFFERENTIAL TYPE: NORMAL
EOSINOPHILS RELATIVE PERCENT: 2 % (ref 1–4)
GFR AFRICAN AMERICAN: >60 ML/MIN
GFR NON-AFRICAN AMERICAN: >60 ML/MIN
GFR SERPL CREATININE-BSD FRML MDRD: ABNORMAL ML/MIN/{1.73_M2}
GFR SERPL CREATININE-BSD FRML MDRD: ABNORMAL ML/MIN/{1.73_M2}
GLUCOSE BLD-MCNC: 80 MG/DL (ref 70–99)
HCT VFR BLD CALC: 45.1 % (ref 36.3–47.1)
HEMOGLOBIN: 14.7 G/DL (ref 11.9–15.1)
IMMATURE GRANULOCYTES: 0 %
LYMPHOCYTES # BLD: 29 % (ref 24–43)
MCH RBC QN AUTO: 30.2 PG (ref 25.2–33.5)
MCHC RBC AUTO-ENTMCNC: 32.6 G/DL (ref 28.4–34.8)
MCV RBC AUTO: 92.6 FL (ref 82.6–102.9)
MONOCYTES # BLD: 7 % (ref 3–12)
NRBC AUTOMATED: 0 PER 100 WBC
PDW BLD-RTO: 12.6 % (ref 11.8–14.4)
PLATELET # BLD: 283 K/UL (ref 138–453)
PLATELET ESTIMATE: NORMAL
PMV BLD AUTO: 10.6 FL (ref 8.1–13.5)
POTASSIUM SERPL-SCNC: 4.4 MMOL/L (ref 3.7–5.3)
RBC # BLD: 4.87 M/UL (ref 3.95–5.11)
RBC # BLD: NORMAL 10*6/UL
SEG NEUTROPHILS: 61 % (ref 36–65)
SEGMENTED NEUTROPHILS ABSOLUTE COUNT: 4.55 K/UL (ref 1.5–8.1)
SODIUM BLD-SCNC: 141 MMOL/L (ref 135–144)
TOTAL PROTEIN: 7.5 G/DL (ref 6.4–8.3)
WBC # BLD: 7.5 K/UL (ref 3.5–11.3)
WBC # BLD: NORMAL 10*3/UL

## 2020-06-11 ENCOUNTER — TELEPHONE (OUTPATIENT)
Dept: OBGYN CLINIC | Age: 40
End: 2020-06-11

## 2020-06-11 NOTE — TELEPHONE ENCOUNTER
46 y/o Calling pt back [facility] can no longer fill her Imvexxy and need to get it through [facility].

## 2020-06-12 LAB — CHROMOGRANIN A: 32 NG/ML (ref 0–160)

## 2020-06-12 RX ORDER — ESTRADIOL 10 UG/1
1 INSERT VAGINAL
Qty: 8 EACH | Refills: 6 | Status: SHIPPED | OUTPATIENT
Start: 2020-06-12 | End: 2020-06-22 | Stop reason: SDUPTHER

## 2020-06-22 RX ORDER — ESTRADIOL 10 UG/1
1 INSERT VAGINAL
Qty: 24 EACH | Refills: 4 | Status: SHIPPED | OUTPATIENT
Start: 2020-06-22 | End: 2021-08-03

## 2020-06-30 RX ORDER — TIZANIDINE 2 MG/1
TABLET ORAL
Qty: 30 TABLET | Refills: 0 | Status: SHIPPED | OUTPATIENT
Start: 2020-06-30 | End: 2020-07-28

## 2020-07-28 RX ORDER — TIZANIDINE 2 MG/1
TABLET ORAL
Qty: 30 TABLET | Refills: 0 | Status: SHIPPED | OUTPATIENT
Start: 2020-07-28 | End: 2020-08-25 | Stop reason: SDUPTHER

## 2020-08-17 RX ORDER — LIRAGLUTIDE 6 MG/ML
INJECTION, SOLUTION SUBCUTANEOUS
Qty: 15 ML | Refills: 0 | OUTPATIENT
Start: 2020-08-17

## 2020-08-25 ENCOUNTER — OFFICE VISIT (OUTPATIENT)
Dept: FAMILY MEDICINE CLINIC | Age: 40
End: 2020-08-25
Payer: COMMERCIAL

## 2020-08-25 VITALS
OXYGEN SATURATION: 99 % | BODY MASS INDEX: 30.13 KG/M2 | DIASTOLIC BLOOD PRESSURE: 76 MMHG | HEART RATE: 80 BPM | SYSTOLIC BLOOD PRESSURE: 120 MMHG | HEIGHT: 67 IN | WEIGHT: 192 LBS | TEMPERATURE: 97.2 F

## 2020-08-25 PROCEDURE — G8417 CALC BMI ABV UP PARAM F/U: HCPCS | Performed by: PHYSICIAN ASSISTANT

## 2020-08-25 PROCEDURE — 1036F TOBACCO NON-USER: CPT | Performed by: PHYSICIAN ASSISTANT

## 2020-08-25 PROCEDURE — G8427 DOCREV CUR MEDS BY ELIG CLIN: HCPCS | Performed by: PHYSICIAN ASSISTANT

## 2020-08-25 PROCEDURE — 99214 OFFICE O/P EST MOD 30 MIN: CPT | Performed by: PHYSICIAN ASSISTANT

## 2020-08-25 RX ORDER — LIRAGLUTIDE 6 MG/ML
INJECTION, SOLUTION SUBCUTANEOUS
Qty: 15 ML | Refills: 0 | Status: CANCELLED | OUTPATIENT
Start: 2020-08-25

## 2020-08-25 RX ORDER — PANTOPRAZOLE SODIUM 40 MG/1
40 TABLET, DELAYED RELEASE ORAL DAILY
COMMUNITY
End: 2021-02-24

## 2020-08-25 RX ORDER — TIZANIDINE 2 MG/1
TABLET ORAL
Qty: 30 TABLET | Refills: 0 | OUTPATIENT
Start: 2020-08-25

## 2020-08-25 RX ORDER — TIZANIDINE 2 MG/1
TABLET ORAL
Qty: 30 TABLET | Refills: 3 | Status: SHIPPED | OUTPATIENT
Start: 2020-08-25 | End: 2020-10-12 | Stop reason: DRUGHIGH

## 2020-08-25 RX ORDER — OXYBUTYNIN CHLORIDE 10 MG/1
10 TABLET, EXTENDED RELEASE ORAL DAILY
Qty: 30 TABLET | Refills: 1 | Status: SHIPPED | OUTPATIENT
Start: 2020-08-25 | End: 2020-09-14 | Stop reason: ALTCHOICE

## 2020-08-25 ASSESSMENT — ENCOUNTER SYMPTOMS
COUGH: 0
BACK PAIN: 1
COLOR CHANGE: 0
ABDOMINAL PAIN: 0
DIARRHEA: 0
VOMITING: 0
CONSTIPATION: 0
NAUSEA: 0
SHORTNESS OF BREATH: 0

## 2020-08-25 NOTE — PROGRESS NOTES
Visit Information    Have you changed or started any medications since your last visit including any over-the-counter medicines, vitamins, or herbal medicines? no   Are you having any side effects from any of your medications? -  no  Have you stopped taking any of your medications? Is so, why? -  no    Have you seen any other physician or provider since your last visit? No  Have you had any other diagnostic tests since your last visit? No  Have you been seen in the emergency room and/or had an admission to a hospital since we last saw you? No  Have you had your routine dental cleaning in the past 6 months? no    Have you activated your [employer] account? If not, what are your barriers?  Yes     Patient Care Team:  Lena Stringer PA-C as PCP - General (Family Medicine)  Lena Stringer PA-C as PCP - [facility] Provider    Medical History Review  Past Medical, Family, and Social History reviewed and does contribute to the patient presenting condition    Health Maintenance   Topic Date Due    Varicella vaccine (1 of 2 - 2-dose childhood series) 03/22/1982    Flu vaccine (1) 09/01/2020    Cervical cancer screen  10/11/2022    DTaP/Tdap/Td vaccine (2 - Td) 08/28/2028    HIV screen  Completed    Hepatitis A vaccine  Aged Out    Hepatitis B vaccine  Aged Out    Hib vaccine  Aged Out    Meningococcal (ACWY) vaccine  Aged Out    Pneumococcal 0-64 years Vaccine  Aged Out

## 2020-08-25 NOTE — PROGRESS NOTES
[address] [facility]  [address]  [address] [facility] [address]  Dept: [phone]  Dept Fax: [phone]    Izzy Nunez is a 45 y.o. female who presents today for her medical conditions/complaintsas noted below. Izzy Nunez is c/o of   Chief Complaint   Patient presents with    Other     medication recheck         HPI:     HPI    Patient here for medication recheck. She also updates she had started seeing GI in the spring. She had EGD and colonoscopy. During the endoscopy she had a tumor noted, was removed and referred to oncology, Dr. Erika Muñiz as well as oncology at [facility]. Concern was for a malignant carcinoid tumor/NET grade 1. She has further testing planned at [address] [place] [address] upcoming. She also has apt with genetics as this is the 2nd cancer since age 17, cervical cancer. She also reports now back to work and started noticing low back pain again with radiation to the left leg. She followed up with Dr Jordyn Velasco and had myelogram yesterday. Has follow up pending. Patient still smoking 1/2 ppd    Patient also requesting labs for hormones. Has been seeing GYN for HRT and states feeling better. Weight seems to be responding with some loss as well.  Menses more regular now also    No results found for: LABA1C          ( goal A1Cis < 7)   No results found for: LABMICR  LDL Cholesterol (mg/dL)   Date Value   01/29/2019 100   05/18/2018 145 (H)   01/20/2017 130       (goal LDL is <100)   AST (U/L)   Date Value   06/09/2020 24     ALT (U/L)   Date Value   06/09/2020 33     BUN (mg/dL)   Date Value   06/09/2020 13     BP Readings from Last 3 Encounters:   08/25/20 120/76   05/28/20 108/60   05/28/20 (!) 75/42          (goal 120/80)    Past Medical History:   Diagnosis Date    ADD (attention deficit disorder)     Anxiety     Back pain     Carpal tunnel syndrome, bilateral 05/19/2016    Chronic back pain     started pain mgmnt 2/218    Depression     Foot pain     left (d/t back)    History of cervical cancer age 17    History of colon polyps     x 1 precancerous polyp    History of gastroesophageal reflux (GERD)     \"mild\"  no medications    History of miscarriage     x 2    Hx of blood clots     right leg superficial blood clot    Hyperlipidemia     watching diet    Lumbago     Neuroendocrine tumor     OAB (overactive bladder) 7/12/2017    Obesity (BMI 30-39. 9)     PUD (peptic ulcer disease)     Sciatica     Urge incontinence       Past Surgical History:   Procedure Laterality Date    BACK SURGERY  09/19/2016    Lumbar interbody fusion posterior, L5-S1    COLONOSCOPY  05/18/2020    DILATION AND CURETTAGE OF UTERUS      LEEP  age 13    OTHER SURGICAL HISTORY  03/26/2018    myelogram    [place] LAMINECTOMY,>2 SGMT,LUMBAR N/A 4/23/2018    LUMBAR MICRO DISKECTOMY   L45 performed by Walker Cobb MD at [address] [place] [address] N/A 6/25/2018    LUMBAR INTERBODY FUSION POSTERIOR L45 performed by [facility] at [facility]      UPPER GASTROINTESTINAL ENDOSCOPY  05/18/2020    UPPER GASTROINTESTINAL ENDOSCOPY  05/28/2020    polypectomy    UPPER GASTROINTESTINAL ENDOSCOPY N/A 5/28/2020    EGD BIOPSY **CASE IN OR. WITH G.I. STAFF**  EGD ESOPHAGOGASTRODUODENOSCOPY performed by Tacos Soto MD at [facility]    UPPER GASTROINTESTINAL ENDOSCOPY  5/28/2020    EGD CONTROL HEMORRHAGE.  APC performed by Tacos Stoo MD at [facility]    WISDOM TOOTH EXTRACTION         Family History   Problem Relation Age of Onset    High Cholesterol Mother     Other Mother         premature menopause    Hypertension Father     Diabetes Neg Hx     Cancer Neg Hx        Social History     Tobacco Use    Smoking status: Former Smoker     Packs/day: 1.00     Years: 15.00     Pack years: 15.00     Types: Cigarettes     Last attempt to quit: 7/13/2016     Years since quittin.1    Smokeless tobacco: Never Used   Substance Use Topics    Alcohol use: Yes     Types: 1 Glasses of wine per week     Comment: Maybe a couple drinks a month      Current Outpatient Medications   Medication Sig Dispense Refill    tiZANidine (ZANAFLEX) 2 MG tablet TAKE 1 TABLET BY MOUTH THREE TIMES A DAY AS NEEDED FOR MUSCLE SPASM 30 tablet 3    pantoprazole (PROTONIX) 40 MG tablet Take 40 mg by mouth daily      oxybutynin (DITROPAN XL) 10 MG extended release tablet Take 1 tablet by mouth daily 30 tablet 1    Estradiol (IMVEXXY MAINTENANCE PACK) 10 MCG INST Place 1 suppository vaginally Twice a Week 24 each 4    amphetamine-dextroamphetamine (ADDERALL) 20 MG tablet Take 1 tablet by mouth 2 times daily for 30 days. 60 tablet 0    fluticasone (FLONASE) 50 MCG/ACT nasal spray INHALE 2 SPRAYS NASALLY TWO TIMES A DAY  16 g 3    ibuprofen (ADVIL;MOTRIN) 800 MG tablet TAKE 1 TABLET BY MOUTH EVERY SIX HOURS WITH FOOD or milk 120 tablet 1    triamcinolone (KENALOG) 0.1 % cream Apply topically 2 times daily. 60 g 0    vitamin B-12 (CYANOCOBALAMIN) 1000 MCG tablet Take 1,000 mcg by mouth daily      Multiple Vitamins-Minerals (THERAPEUTIC MULTIVITAMIN-MINERALS) tablet Take 1 tablet by mouth daily      diphenhydrAMINE (BENADRYL) 25 MG tablet Take 25 mg by mouth every 6 hours as needed for Itching      Sennosides-Docusate Sodium (STOOL SOFTENER LAXATIVE PO) Take 1 tablet by mouth 2 times daily as needed       Lactobacillus (PROBIOTIC ACIDOPHILUS PO) Take 1 tablet by mouth daily       No current facility-administered medications for this visit.       No Known Allergies    Health Maintenance   Topic Date Due    Varicella vaccine (1 of 2 - 2-dose childhood series) 1982    Flu vaccine (1) 2020    Cervical cancer screen  10/11/2022    DTaP/Tdap/Td vaccine (2 - Td) 2028    HIV screen  Completed    Hepatitis A vaccine  Aged Out    Hepatitis B vaccine  Aged Out    Hib vaccine  Aged Out  Meningococcal (ACWY) vaccine  Aged Out    Pneumococcal 0-64 years Vaccine  Aged Out       Subjective:     Review of Systems   Constitutional: Negative for activity change, appetite change, fatigue, fever and unexpected weight change. /76 (Site: Right Upper Arm, Position: Sitting, Cuff Size: Medium Adult)   Pulse 80   Temp 97.2 °F (36.2 °C) (Tympanic)   Ht 5' 7\" (1.702 m)   Wt 192 lb (87.1 kg)   SpO2 99%   BMI 30.07 kg/m²    Respiratory: Negative for cough and shortness of breath. Cardiovascular: Negative for chest pain. Gastrointestinal: Negative for abdominal pain, constipation, diarrhea, nausea and vomiting. Musculoskeletal: Positive for back pain (chronic). Skin: Negative for color change, pallor, rash and wound. Hematological: Negative for adenopathy. Psychiatric/Behavioral: Negative for sleep disturbance. The patient is not nervous/anxious. Objective:     Physical Exam  Vitals signs and nursing note reviewed. Constitutional:       General: She is not in acute distress. Appearance: Normal appearance. She is well-developed. She is not ill-appearing. HENT:      Head: Normocephalic and atraumatic. Cardiovascular:      Rate and Rhythm: Normal rate and regular rhythm. Heart sounds: No murmur. Pulmonary:      Effort: Pulmonary effort is normal. No respiratory distress. Breath sounds: Normal breath sounds. No wheezing or rales. Skin:     General: Skin is warm and dry. Coloration: Skin is not pale. Findings: No erythema or rash. Neurological:      Mental Status: She is alert and oriented to person, place, and time. Psychiatric:         Mood and Affect: Mood normal.         Behavior: Behavior normal.         Thought Content:  Thought content normal.         Judgment: Judgment normal.       /76 (Site: Right Upper Arm, Position: Sitting, Cuff Size: Medium Adult)   Pulse 80   Temp 97.2 °F (36.2 °C) (Tympanic)   Ht 5' 7\" (1.702 m)   Wt 192 lb (87.1 kg)   SpO2 99%   BMI 30.07 kg/m²     Assessment:       Diagnosis Orders   1. Degenerative disc disease, lumbar  tiZANidine (ZANAFLEX) 2 MG tablet   2. Urge incontinence  oxybutynin (DITROPAN XL) 10 MG extended release tablet   3. Amenorrhea  Estrogens, Fractionated    Follicle Stimulating Hormone    Luteinizing Hormone    Testosterone Free and Total Male   4. Tobacco abuse               Plan:      Return in about 6 months (around 2/25/2021) for med review. Cont with neurosurgeon as planned  Refill on mm relaxor given  Patient requsting change in OAB product. Will DC triospium and start ditropan XL  Patient feeling better on HRT. Will check new lab levels for her. Copy to GYN  Stressed need for smoking cessation  Cont with oncology local and [facility] as planned. Requested copy of genetic work up once completed  Patient agreed with treatment plan      Orders Placed This Encounter   Medications    tiZANidine (ZANAFLEX) 2 MG tablet     Sig: TAKE 1 TABLET BY MOUTH THREE TIMES A DAY AS NEEDED FOR MUSCLE SPASM     Dispense:  30 tablet     Refill:  3    oxybutynin (DITROPAN XL) 10 MG extended release tablet     Sig: Take 1 tablet by mouth daily     Dispense:  30 tablet     Refill:  1       Patient given educational materials - see patient instructions. Discussed use, benefit, and side effects of prescribed medications. All patientquestions answered. Pt voiced understanding. Reviewed health maintenance. Instructedto continue current medications, diet and exercise. Patient agreed with treatmentplan. Follow up as directed.      Electronically signed by Ara Wolf PA-C on 8/25/2020 at 2:36 PM

## 2020-09-08 ENCOUNTER — HOSPITAL ENCOUNTER (OUTPATIENT)
Age: 40
Setting detail: SPECIMEN
Discharge: HOME | End: 2020-09-08
Payer: COMMERCIAL

## 2020-09-08 LAB
FOLLICLE STIMULATING HORMONE: 10.9 U/L (ref 1.7–21.5)
LH: 20.3 U/L (ref 1–95.6)
SEX HORMONE BINDING GLOBULIN: 50 NMOL/L (ref 30–135)
TESTOSTERONE FREE-NONMALE: 2.1 PG/ML (ref 1.3–9.2)
TESTOSTERONE TOTAL: 15 NG/DL (ref 20–70)

## 2020-09-13 LAB
ESTRADIOL LEVEL: 185 PG/ML
ESTROGEN TOTAL: 306 PG/ML
ESTRONE: 121 PG/ML

## 2020-09-14 ENCOUNTER — PATIENT MESSAGE (OUTPATIENT)
Dept: FAMILY MEDICINE CLINIC | Age: 40
End: 2020-09-14

## 2020-09-14 RX ORDER — TOLTERODINE 4 MG/1
4 CAPSULE, EXTENDED RELEASE ORAL DAILY
Qty: 30 CAPSULE | Refills: 3 | Status: SHIPPED | OUTPATIENT
Start: 2020-09-14 | End: 2020-11-25

## 2020-09-14 NOTE — TELEPHONE ENCOUNTER
From: Jeferson Rosales  To: Sidney Gil PA-C  Sent: 9/14/2020 9:55 AM EDT  Subject: Prescription Question    Hello. So I gave the Ditropan almost a month to work. It doesn't seem like it works any better than the trospium chloride. Would you be able to switch it to something else?

## 2020-10-12 ENCOUNTER — HOSPITAL ENCOUNTER (OUTPATIENT)
Age: 40
Setting detail: SPECIMEN
Discharge: HOME | End: 2020-10-12
Payer: COMMERCIAL

## 2020-10-12 ENCOUNTER — OFFICE VISIT (OUTPATIENT)
Dept: FAMILY MEDICINE CLINIC | Age: 40
End: 2020-10-12
Payer: COMMERCIAL

## 2020-10-12 VITALS
OXYGEN SATURATION: 100 % | BODY MASS INDEX: 30.45 KG/M2 | TEMPERATURE: 97 F | HEIGHT: 67 IN | DIASTOLIC BLOOD PRESSURE: 72 MMHG | WEIGHT: 194 LBS | SYSTOLIC BLOOD PRESSURE: 116 MMHG | HEART RATE: 58 BPM

## 2020-10-12 LAB
ABSOLUTE EOS #: 0.08 K/UL (ref 0–0.44)
ABSOLUTE IMMATURE GRANULOCYTE: 0.04 K/UL (ref 0–0.3)
ABSOLUTE LYMPH #: 2.6 K/UL (ref 1.1–3.7)
ABSOLUTE MONO #: 0.84 K/UL (ref 0.1–1.2)
ALBUMIN SERPL-MCNC: 4.3 G/DL (ref 3.5–5.2)
ALBUMIN/GLOBULIN RATIO: 1.5 (ref 1–2.5)
ALP BLD-CCNC: 69 U/L (ref 35–104)
ALT SERPL-CCNC: 22 U/L (ref 5–33)
ANION GAP SERPL CALCULATED.3IONS-SCNC: 15 MMOL/L (ref 9–17)
AST SERPL-CCNC: 16 U/L
BASOPHILS # BLD: 0 % (ref 0–2)
BASOPHILS ABSOLUTE: 0.03 K/UL (ref 0–0.2)
BILIRUB SERPL-MCNC: 0.28 MG/DL (ref 0.3–1.2)
BUN BLDV-MCNC: 15 MG/DL (ref 6–20)
BUN/CREAT BLD: ABNORMAL (ref 9–20)
CALCIUM SERPL-MCNC: 9.7 MG/DL (ref 8.6–10.4)
CHLORIDE BLD-SCNC: 105 MMOL/L (ref 98–107)
CO2: 23 MMOL/L (ref 20–31)
CREAT SERPL-MCNC: 0.55 MG/DL (ref 0.5–0.9)
DIFFERENTIAL TYPE: ABNORMAL
EOSINOPHILS RELATIVE PERCENT: 1 % (ref 1–4)
GFR AFRICAN AMERICAN: >60 ML/MIN
GFR NON-AFRICAN AMERICAN: >60 ML/MIN
GFR SERPL CREATININE-BSD FRML MDRD: ABNORMAL ML/MIN/{1.73_M2}
GFR SERPL CREATININE-BSD FRML MDRD: ABNORMAL ML/MIN/{1.73_M2}
GLUCOSE BLD-MCNC: 75 MG/DL (ref 70–99)
HCT VFR BLD CALC: 46.3 % (ref 36.3–47.1)
HEMOGLOBIN: 14.3 G/DL (ref 11.9–15.1)
IMMATURE GRANULOCYTES: 0 %
LYMPHOCYTES # BLD: 22 % (ref 24–43)
MCH RBC QN AUTO: 29.6 PG (ref 25.2–33.5)
MCHC RBC AUTO-ENTMCNC: 30.9 G/DL (ref 28.4–34.8)
MCV RBC AUTO: 95.9 FL (ref 82.6–102.9)
MONOCYTES # BLD: 7 % (ref 3–12)
NRBC AUTOMATED: 0 PER 100 WBC
PDW BLD-RTO: 12.8 % (ref 11.8–14.4)
PLATELET # BLD: 286 K/UL (ref 138–453)
PLATELET ESTIMATE: ABNORMAL
PMV BLD AUTO: 10.9 FL (ref 8.1–13.5)
POTASSIUM SERPL-SCNC: 4.5 MMOL/L (ref 3.7–5.3)
RBC # BLD: 4.83 M/UL (ref 3.95–5.11)
RBC # BLD: ABNORMAL 10*6/UL
SEG NEUTROPHILS: 70 % (ref 36–65)
SEGMENTED NEUTROPHILS ABSOLUTE COUNT: 8.02 K/UL (ref 1.5–8.1)
SODIUM BLD-SCNC: 143 MMOL/L (ref 135–144)
TOTAL PROTEIN: 7.2 G/DL (ref 6.4–8.3)
WBC # BLD: 11.6 K/UL (ref 3.5–11.3)
WBC # BLD: ABNORMAL 10*3/UL

## 2020-10-12 PROCEDURE — 99395 PREV VISIT EST AGE 18-39: CPT | Performed by: PHYSICIAN ASSISTANT

## 2020-10-12 PROCEDURE — G8484 FLU IMMUNIZE NO ADMIN: HCPCS | Performed by: PHYSICIAN ASSISTANT

## 2020-10-12 RX ORDER — TIZANIDINE 4 MG/1
4 TABLET ORAL EVERY 8 HOURS PRN
Qty: 30 TABLET | Refills: 1 | Status: SHIPPED | OUTPATIENT
Start: 2020-10-12 | End: 2020-11-25

## 2020-10-12 RX ORDER — IBUPROFEN 800 MG/1
TABLET ORAL
Qty: 120 TABLET | Refills: 0 | Status: SHIPPED | OUTPATIENT
Start: 2020-10-12 | End: 2021-04-21

## 2020-10-12 NOTE — PROGRESS NOTES
[address]  [address]  [phone]      Alma Benedict is a 45 y.o. female who presents today for her  medical conditions/complaints as noted below. Alma Benedict is c/o of Gynecologic Exam  .    HPI:     HPI    GYN History:  Menstrual Hx:       DOLP: 25 days ago  ST1 Hx: many years ago  Ectopic pregnancy Hx: no  Menarche: 14  Cycle every 30 days  Duration of Cycle: 4  Dysmenorrhea:  na  Sexually Active:  yes  Dyspareunia: no  No new partners, no concern for STIs    Patient also reporting still following with her neurologist and pain management. Having more cramps in her legs and asking to try higher dose zanaflex. Patient also having urinary urgency for years. Medication not that helpful. Has seen urology in the past but many years ago. Does not remember doing any testing for this. Past Medical History:   Diagnosis Date    ADD (attention deficit disorder)     Anxiety     Back pain     Carpal tunnel syndrome, bilateral 05/19/2016    Chronic back pain     started pain mgmnt 2/218    Depression     Foot pain     left (d/t back)    History of cervical cancer age 17    History of colon polyps     x 1 precancerous polyp    History of gastroesophageal reflux (GERD)     \"mild\"  no medications    History of miscarriage     x 2    Hx of blood clots     right leg superficial blood clot    Hyperlipidemia     watching diet    Lumbago     Neuroendocrine tumor     OAB (overactive bladder) 7/12/2017    Obesity (BMI 30-39. 9)     PUD (peptic ulcer disease)     Sciatica     Urge incontinence       Past Surgical History:   Procedure Laterality Date    BACK SURGERY  09/19/2016    Lumbar interbody fusion posterior, L5-S1    COLONOSCOPY  05/18/2020    DILATION AND CURETTAGE OF UTERUS      LEEP  age 13    OTHER SURGICAL HISTORY  03/26/2018    myelogram    [place] LAMINECTOMY,>2 SGMT,LUMBAR N/A 4/23/2018    LUMBAR MICRO DISKECTOMY   L45 performed by Viki Chaves MD at [address] [place] N/A 2018    LUMBAR INTERBODY FUSION POSTERIOR L45 performed by [facility]iki [facility]aves MD at [facility]      UPPER GASTROINTESTINAL ENDOSCOPY  2020    UPPER GASTROINTESTINAL ENDOSCOPY  2020    polypectomy    UPPER GASTROINTESTINAL ENDOSCOPY N/A 2020    EGD BIOPSY **CASE IN OR. WITH G.I. STAFF**  EGD ESOPHAGOGASTRODUODENOSCOPY performed by Vel Roe MD at [facility]    UPPER GASTROINTESTINAL ENDOSCOPY  2020    EGD CONTROL HEMORRHAGE. APC performed by Vel Roe MD at [facility]    WISDOM TOOTH EXTRACTION       Family History   Problem Relation Age of Onset    High Cholesterol Mother     Other Mother         premature menopause    Hypertension Father     Diabetes Neg Hx     Cancer Neg Hx      Social History     Tobacco Use    Smoking status: Current Every Day Smoker     Packs/day: 0.50     Years: 15.00     Pack years: 7.50     Types: Cigarettes     Last attempt to quit: 2016     Years since quittin.2    Smokeless tobacco: Never Used   Substance Use Topics    Alcohol use: Yes     Types: 1 Glasses of wine per week     Comment: Maybe a couple drinks a month      Current Outpatient Medications   Medication Sig Dispense Refill    tiZANidine (ZANAFLEX) 4 MG tablet Take 1 tablet by mouth every 8 hours as needed (spasm) 30 tablet 1    tolterodine (DETROL LA) 4 MG extended release capsule Take 1 capsule by mouth daily 30 capsule 3    pantoprazole (PROTONIX) 40 MG tablet Take 40 mg by mouth daily      Estradiol (IMVEXXY MAINTENANCE PACK) 10 MCG INST Place 1 suppository vaginally Twice a Week 24 each 4    amphetamine-dextroamphetamine (ADDERALL) 20 MG tablet Take 1 tablet by mouth 2 times daily for 30 days.  60 tablet 0    fluticasone (FLONASE) 50 MCG/ACT nasal spray INHALE 2 SPRAYS NASALLY TWO TIMES A DAY  16 g 3    ibuprofen lymphadenopathy  Pulmonary/Chest: clear to auscultation bilaterally- no wheezes, rales or rhonchi, normal air movement, no respiratory distress  Cardiovascular: normal rate, regular rhythm, normal S1 and S2, no murmurs, rubs, clicks, or gallops  Abdomen: soft, non-tender, non-distended, normal bowel sounds, no masses or organomegaly  Pelvic: normal external genitalia, vulva, vagina, cervix, uterus and adnexa. No CMT. No noted discharge or lesions. No masses noted. Breast: appear normal, no suspicious masses, no skin or nipple changes or axillary nodes bilaterally. Psych: pleasant, cooperative      Assessment:    annual pap exam   Diagnosis Orders   1. Pap smear for cervical cancer screening  PAP SMEAR       Plan:      Return in about 1 year (around 10/12/2021) for annual exam.     Office will call pt with pap results in the next 10 days. If no call received pt agreed to call in the next 2 weeks for results. Declined flu shot  Increase tizanidine to 4mg trial  Cont with pain management  Patient agreed with treatment plan    Orders Placed This Encounter   Procedures    PAP SMEAR     Order Specific Question:   Collection Type     Answer: Thin Prep     Order Specific Question:   Prior Abnormal Pap Test     Answer:   No     Order Specific Question:   Screening or Diagnostic     Answer:   Screening     Order Specific Question:   HPV Requested? Answer:   Yes     Order Specific Question:   High Risk Patient     Answer:   Lack of Screening         Patient given educational materials - see patient instructions. Discussed use, benefit, and side effects of prescribed medications. All patient questions answered. Pt voiced understanding. Reviewed health maintenance. Instructed to continue current medications, diet and exercise. Patient agreed with treatment plan. Follow up as directed below.      Electronically signed by LAURA Alexandre on 10/12/2020 at 1:27 PM

## 2020-10-18 LAB
HPV SOURCE: NORMAL
HPV, GENOTYPE 16: NOT DETECTED
HPV, GENOTYPE 18: NOT DETECTED
HPV, HIGH RISK OTHER: NOT DETECTED

## 2020-10-20 LAB — CYTOLOGY REPORT: NORMAL

## 2020-10-21 PROBLEM — R35.1 NOCTURIA: Status: ACTIVE | Noted: 2020-10-21

## 2020-10-21 PROBLEM — R35.0 URINARY FREQUENCY: Status: ACTIVE | Noted: 2020-10-21

## 2021-01-12 ENCOUNTER — OFFICE VISIT (OUTPATIENT)
Dept: FAMILY MEDICINE CLINIC | Age: 41
End: 2021-01-12
Payer: COMMERCIAL

## 2021-01-12 VITALS
HEART RATE: 60 BPM | HEIGHT: 67 IN | OXYGEN SATURATION: 99 % | DIASTOLIC BLOOD PRESSURE: 82 MMHG | WEIGHT: 201 LBS | TEMPERATURE: 97 F | BODY MASS INDEX: 31.55 KG/M2 | SYSTOLIC BLOOD PRESSURE: 118 MMHG

## 2021-01-12 DIAGNOSIS — M51.36 DEGENERATIVE DISC DISEASE, LUMBAR: Primary | ICD-10-CM

## 2021-01-12 PROCEDURE — 99213 OFFICE O/P EST LOW 20 MIN: CPT | Performed by: PHYSICIAN ASSISTANT

## 2021-01-12 ASSESSMENT — PATIENT HEALTH QUESTIONNAIRE - PHQ9
SUM OF ALL RESPONSES TO PHQ9 QUESTIONS 1 & 2: 0
SUM OF ALL RESPONSES TO PHQ QUESTIONS 1-9: 0
SUM OF ALL RESPONSES TO PHQ QUESTIONS 1-9: 0
2. FEELING DOWN, DEPRESSED OR HOPELESS: 0

## 2021-01-12 ASSESSMENT — ENCOUNTER SYMPTOMS: COLOR CHANGE: 0

## 2021-01-12 NOTE — PROGRESS NOTES
 PUD (peptic ulcer disease)     Sciatica     Urge incontinence       Past Surgical History:   Procedure Laterality Date    BACK SURGERY  2016    Lumbar interbody fusion posterior, L5-S1    BACK SURGERY      COLONOSCOPY  2020    DILATION AND CURETTAGE OF UTERUS      EYE SURGERY      R    LEEP  age 13    OTHER SURGICAL HISTORY  2018    myelogram    NE LAMINECTOMY,>2 SGMT,LUMBAR N/A 2018    LUMBAR MICRO DISKECTOMY   L45 performed by Kaveh Mcgill MD at [address] [place] [address] N/A 2018    LUMBAR INTERBODY FUSION POSTERIOR L45 [facility]      UPPER GASTROINTESTINAL ENDOSCOPY  2020    UPPER GASTROINTESTINAL ENDOSCOPY  2020    polypectomy    UPPER GASTROINTESTINAL ENDOSCOPY N/A 2020    EGD BIOPSY **CASE IN OR. WITH G.I. STAFF**  EGD ESOPHAGOGASTRODUODENOSCOPY performed by [facility]riam Goddard MD at [facility]    UPPER GASTROINTESTINAL ENDOSCOPY  2020    EGD CONTROL HEMORRHAGE.  APC performed by Myriam Goddard MD at [facility]    WISDOM TOOTH EXTRACTION         Family History   Problem Relation Age of Onset    High Cholesterol Mother     Other Mother         premature menopause    Hypertension Father     Diabetes Neg Hx     Cancer Neg Hx        Social History     Tobacco Use    Smoking status: Current Every Day Smoker     Packs/day: 0.50     Years: 15.00     Pack years: 7.50     Types: Cigarettes     Last attempt to quit: 2016     Years since quittin.5    Smokeless tobacco: Never Used   Substance Use Topics    Alcohol use: Yes     Types: 1 Glasses of wine per week     Comment: Maybe a couple drinks a month      Current Outpatient Medications   Medication Sig Dispense Refill    solifenacin (VESICARE) 5 MG tablet Take 1 tablet by mouth daily 30 tablet 2  carisoprodol (SOMA) 250 MG tablet Take 50 mg by mouth 4 times daily.  ibuprofen (ADVIL;MOTRIN) 800 MG tablet TAKE 1 TABLET BY MOUTH EVERY SIX HOURS WITH FOOD or milk 120 tablet 0    pantoprazole (PROTONIX) 40 MG tablet Take 40 mg by mouth daily      Estradiol (IMVEXXY MAINTENANCE PACK) 10 MCG INST Place 1 suppository vaginally Twice a Week 24 each 4    amphetamine-dextroamphetamine (ADDERALL) 20 MG tablet Take 1 tablet by mouth 2 times daily for 30 days. 60 tablet 0    fluticasone (FLONASE) 50 MCG/ACT nasal spray INHALE 2 SPRAYS NASALLY TWO TIMES A DAY  16 g 3    triamcinolone (KENALOG) 0.1 % cream Apply topically 2 times daily. 60 g 0    vitamin B-12 (CYANOCOBALAMIN) 1000 MCG tablet Take 1,000 mcg by mouth daily      Multiple Vitamins-Minerals (THERAPEUTIC MULTIVITAMIN-MINERALS) tablet Take 1 tablet by mouth daily      diphenhydrAMINE (BENADRYL) 25 MG tablet Take 25 mg by mouth every 6 hours as needed for Itching      Sennosides-Docusate Sodium (STOOL SOFTENER LAXATIVE PO) Take 1 tablet by mouth 2 times daily as needed       Lactobacillus (PROBIOTIC ACIDOPHILUS PO) Take 1 tablet by mouth daily       No current facility-administered medications for this visit. No Known Allergies    Health Maintenance   Topic Date Due    Hepatitis C screen  1981    Varicella vaccine (1 of 2 - 2-dose childhood series) 03/22/1982    Pneumococcal 0-64 years Vaccine (1 of 1 - PPSV23) 03/22/1987    Flu vaccine (1) 10/12/2021 (Originally 9/1/2020)    Cervical cancer screen  10/12/2025    DTaP/Tdap/Td vaccine (2 - Td) 08/28/2028    HIV screen  Completed    Hepatitis A vaccine  Aged Out    Hepatitis B vaccine  Aged Out    Hib vaccine  Aged Out    Meningococcal (ACWY) vaccine  Aged Out       Subjective:     Review of Systems   Constitutional: Negative for activity change, appetite change, fatigue and fever. Skin: Negative for color change, pallor, rash and wound. Neurological: Negative for weakness and numbness. Hematological: Negative for adenopathy. Psychiatric/Behavioral: The patient is not nervous/anxious. Objective:     Physical Exam  Vitals signs and nursing note reviewed. Constitutional:       Appearance: She is well-developed. HENT:      Head: Normocephalic and atraumatic. Skin:     General: Skin is warm and dry. Coloration: Skin is not pale. Findings: No erythema or rash. Neurological:      Mental Status: She is alert and oriented to person, place, and time. Psychiatric:         Behavior: Behavior normal.         Thought Content: Thought content normal.         Judgment: Judgment normal.       /82 (Site: Right Upper Arm, Position: Sitting, Cuff Size: Medium Adult)   Pulse 60   Temp 97 °F (36.1 °C) (Tympanic)   Ht 5' 7\" (1.702 m)   Wt 201 lb (91.2 kg)   SpO2 99%   BMI 31.48 kg/m²     Assessment:       Diagnosis Orders   1. Degenerative disc disease, lumbar               Plan:      Return in about 1 year (around 1/12/2022). Chronic back pain stable with present treatment  Cont with pain management as planned  Updated FMLA forms today  Recheck in 6-12 mo sooner prn  Patient agreed with treatment plan      Patient given educational materials - see patient instructions. Discussed use, benefit, and side effects of prescribed medications. All patientquestions answered. Pt voiced understanding. Reviewed health maintenance. Instructedto continue current medications, diet and exercise. Patient agreed with treatmentplan. Follow up as directed.      Electronically signed by Bernardo Camp PA-C on 1/12/2021 at 3:01 PM

## 2021-02-25 ENCOUNTER — PATIENT MESSAGE (OUTPATIENT)
Dept: FAMILY MEDICINE CLINIC | Age: 41
End: 2021-02-25

## 2021-02-25 DIAGNOSIS — M51.36 DEGENERATIVE DISC DISEASE, LUMBAR: Primary | ICD-10-CM

## 2021-02-25 DIAGNOSIS — M54.32 SCIATICA OF LEFT SIDE: ICD-10-CM

## 2021-02-25 NOTE — TELEPHONE ENCOUNTER
From: Sixto Gibson  To: Blas Hodgkin, PA-C  Sent: 2/25/2021 10:56 AM EST  Subject: Non-Urgent Medical Question    Hello! I've been once again getting pretty severe leg cramps while sleeping since I've been back to work. I don't really Pleas Poplin follow up with Dr Patricia Coppola because I'm not sure there's much for him to do. I've been using my 's massage gun before bed, with some relief. I have a friend that goes to [facility] for her lupus and they have used dry needling and cupping on her and she feels much better. Would you be able to send me a referral to the [facility] in [facility] to see if that could help my sciatica? I'm willing to do some actual PT if need be, but really hoping to try dry needling and cupping as I'm on a few sciatica forums and quite a few people have also found relief with those. Thank you!

## 2021-04-10 ENCOUNTER — PATIENT MESSAGE (OUTPATIENT)
Dept: FAMILY MEDICINE CLINIC | Age: 41
End: 2021-04-10

## 2021-04-19 ENCOUNTER — PATIENT MESSAGE (OUTPATIENT)
Dept: FAMILY MEDICINE CLINIC | Age: 41
End: 2021-04-19

## 2021-04-20 NOTE — TELEPHONE ENCOUNTER
From: Devaughn Kaufman  To: Charlene Moise PA-C  Sent: 4/19/2021 9:08 PM EDT  Subject: Prescription Question    Hello again! I just looked up contrave on my prescription nahun and it looks like it's now covered again. I've been having eating issues since I had to stop the Saxenda and was hoping I could restart contrave since that's the only appetite suppressant that has helped me.

## 2021-04-21 ENCOUNTER — TELEPHONE (OUTPATIENT)
Dept: FAMILY MEDICINE CLINIC | Age: 41
End: 2021-04-21

## 2021-05-20 RX ORDER — NALTREXONE HYDROCHLORIDE AND BUPROPION HYDROCHLORIDE 8; 90 MG/1; MG/1
TABLET, EXTENDED RELEASE ORAL
Qty: 120 TABLET | Refills: 0 | Status: SHIPPED | OUTPATIENT
Start: 2021-05-20 | End: 2021-06-22 | Stop reason: SDUPTHER

## 2021-06-22 ENCOUNTER — OFFICE VISIT (OUTPATIENT)
Dept: FAMILY MEDICINE CLINIC | Age: 41
End: 2021-06-22
Payer: COMMERCIAL

## 2021-06-22 VITALS
HEIGHT: 67 IN | OXYGEN SATURATION: 98 % | DIASTOLIC BLOOD PRESSURE: 78 MMHG | WEIGHT: 189 LBS | BODY MASS INDEX: 29.66 KG/M2 | SYSTOLIC BLOOD PRESSURE: 118 MMHG | TEMPERATURE: 97 F | HEART RATE: 78 BPM

## 2021-06-22 DIAGNOSIS — R53.83 FATIGUE, UNSPECIFIED TYPE: Primary | ICD-10-CM

## 2021-06-22 DIAGNOSIS — L65.9 HAIR LOSS: ICD-10-CM

## 2021-06-22 DIAGNOSIS — E66.3 OVERWEIGHT (BMI 25.0-29.9): ICD-10-CM

## 2021-06-22 PROCEDURE — 99213 OFFICE O/P EST LOW 20 MIN: CPT | Performed by: PHYSICIAN ASSISTANT

## 2021-06-22 RX ORDER — DEXAMETHASONE 1 MG
TABLET ORAL
Qty: 1 TABLET | Refills: 0 | Status: SHIPPED | OUTPATIENT
Start: 2021-06-22 | End: 2021-07-26

## 2021-06-22 RX ORDER — FLUTICASONE PROPIONATE 50 MCG
SPRAY, SUSPENSION (ML) NASAL
Qty: 16 G | Refills: 3 | Status: SHIPPED | OUTPATIENT
Start: 2021-06-22 | End: 2022-01-03

## 2021-06-22 RX ORDER — NALTREXONE HYDROCHLORIDE AND BUPROPION HYDROCHLORIDE 8; 90 MG/1; MG/1
TABLET, EXTENDED RELEASE ORAL
Qty: 120 TABLET | Refills: 0 | Status: SHIPPED | OUTPATIENT
Start: 2021-06-22 | End: 2021-07-19

## 2021-06-22 ASSESSMENT — ENCOUNTER SYMPTOMS
WHEEZING: 0
ABDOMINAL PAIN: 0
NAUSEA: 0
VOMITING: 0
COLOR CHANGE: 0
SHORTNESS OF BREATH: 0
COUGH: 0
CONSTIPATION: 0
DIARRHEA: 0

## 2021-06-22 NOTE — PROGRESS NOTES
[address] [facility]  [address] [address]  Dept: [phone]  Dept Fax: [phone]    Jason Fuentes is a 37 y.o. female who presents today for her medical conditions/complaintsas noted below. Jason Fuentes is c/o of   Chief Complaint   Patient presents with    Weight Management     contrave follow up         HPI:     HPI    Patient here for follow up on weight loss. She has been using contrave now for 2 months and is down 12 pounds. She states tolerating it well. She does still feel tired and note hair loss. She does feel she has enough hours to sleep. Reports she does not snore. Most days she wakes up feeling refreshed but gets tired easily through the day. She is a heavy sleeper. Does not nap  She is also asking to have her hormones including thyroid checked. She did have a menses last month. She states she is not that predictable with her menses most months. Does note that her monthly periods seem to get further apart. She is seeing [facility] for compounding    She also updates a little right knee pain. No trauma. Just won't crack that well and makes noises on the stairs.    She is having updated PET scan later this month for GI lesion [facility] is helping her monitor  She has new MRI lumbar update for her neurosurgeon  Pain management is also exchanging her pain stimulator battery this month    No results found for: LABA1C          ( goal A1Cis < 7)   No results found for: LABMICR  LDL Cholesterol (mg/dL)   Date Value   01/29/2019 100   05/18/2018 145 (H)   01/20/2017 130       (goal LDL is <100)   AST (U/L)   Date Value   10/12/2020 16     ALT (U/L)   Date Value   10/12/2020 22     BUN (mg/dL)   Date Value   10/12/2020 15     BP Readings from Last 3 Encounters:   06/22/21 118/78   01/12/21 118/82   10/12/20 116/72          (goal 120/80)    Past Medical History:   Diagnosis Date    ADD (attention deficit disorder)     Anxiety     Back pain     Carpal tunnel syndrome, bilateral 05/19/2016    Chronic back pain     started pain mgmnt 2/218    Depression     Foot pain     left (d/t back)    History of cervical cancer age 17    History of colon polyps     x 1 precancerous polyp    History of gastroesophageal reflux (GERD)     \"mild\"  no medications    History of miscarriage     x 2    Hx of blood clots     right leg superficial blood clot    Hyperlipidemia     watching diet    Lumbago     Neuroendocrine tumor     OAB (overactive bladder) 7/12/2017    Obesity (BMI 30-39. 9)     PUD (peptic ulcer disease)     Sciatica     Urge incontinence       Past Surgical History:   Procedure Laterality Date    BACK SURGERY  09/19/2016    Lumbar interbody fusion posterior, L5-S1    BACK SURGERY      COLONOSCOPY  05/18/2020    DILATION AND CURETTAGE OF UTERUS      EYE SURGERY      R    LEEP  age 13    OTHER SURGICAL HISTORY  03/26/2018    myelogram    TX LAMINECTOMY,>2 SGMT,LUMBAR N/A 4/23/2018    LUMBAR MICRO DISKECTOMY   L45 performed by Shonda Proctor MD at [address] [place] [address] N/A 6/25/2018    LUMBAR INTERBODY FUSION POSTERIOR L45 performed by [facility]ctor MD at [facility]      UPPER GASTROINTESTINAL ENDOSCOPY  05/18/2020    UPPER GASTROINTESTINAL ENDOSCOPY  05/28/2020    polypectomy    UPPER GASTROINTESTINAL ENDOSCOPY N/A 5/28/2020    EGD BIOPSY **CASE IN OR. WITH G.I. STAFF**  EGD ESOPHAGOGASTRODUODENOSCOPY performed by Delgado Sesay MD at [facility]    UPPER GASTROINTESTINAL ENDOSCOPY  5/28/2020    EGD CONTROL HEMORRHAGE.  APC performed by Del[facility] MD at [facility]    WISDOM TOOTH EXTRACTION         Family History   Problem Relation Age of Onset    High Cholesterol Mother     Other Mother         premature menopause    Hypertension Father     Diabetes Neg Hx     Cancer Neg Hx        Social History     Tobacco Use    Smoking status: Current Every Day Smoker     Packs/day: 0.50     Years: 15.00     Pack years: 7.50     Types: Cigarettes     Last attempt to quit: 2016     Years since quittin.9    Smokeless tobacco: Never Used   Substance Use Topics    Alcohol use: Yes     Types: 1 Glasses of wine per week     Comment: Maybe a couple drinks a month      Current Outpatient Medications   Medication Sig Dispense Refill    naltrexone-buPROPion (CONTRAVE) 8-90 MG per extended release tablet WEEK 1: TAKE 1 TAB BY MOUTH IN THE AM, WEEK 2: 1 TAB 2 TIMES A DAY, WEEK 3: 2 TABS IN THE AM AND 1 IN THE PM WEEK 4: 2 TABS 2 TIMES A  tablet 0    fluticasone (FLONASE) 50 MCG/ACT nasal spray INHALE 2 SPRAYS NASALLY TWO TIMES A DAY 16 g 3    dexamethasone (DECADRON) 1 MG tablet Take 1 tablet at 11 pm the night before planned test 1 tablet 0    solifenacin (VESICARE) 10 MG tablet Take 1 tablet by mouth daily 90 tablet 1    MYRBETRIQ 50 MG TB24 Take 50 mg by mouth daily 90 tablet 3    carisoprodol (SOMA) 250 MG tablet Take 50 mg by mouth 4 times daily.  vitamin B-12 (CYANOCOBALAMIN) 1000 MCG tablet Take 1,000 mcg by mouth daily      Multiple Vitamins-Minerals (THERAPEUTIC MULTIVITAMIN-MINERALS) tablet Take 1 tablet by mouth daily      Lactobacillus (PROBIOTIC ACIDOPHILUS PO) Take 1 tablet by mouth daily      ibuprofen (ADVIL;MOTRIN) 800 MG tablet TAKE 1 TABLET BY MOUTH EVERY SIX HOURS WITH FOOD or milk as needed pain (Patient not taking: Reported on 2021) 30 tablet 0    Estradiol (IMVEXXY MAINTENANCE PACK) 10 MCG INST Place 1 suppository vaginally Twice a Week 24 each 4    amphetamine-dextroamphetamine (ADDERALL) 20 MG tablet Take 1 tablet by mouth 2 times daily for 30 days. 60 tablet 0    triamcinolone (KENALOG) 0.1 % cream Apply topically 2 times daily.  (Patient not taking: Reported on 2021) 60 g 0    diphenhydrAMINE (BENADRYL) 25 MG tablet Take 25 mg by mouth every 6 hours as needed for Itching (Patient not taking: Reported on 6/22/2021)      Sennosides-Docusate Sodium (STOOL SOFTENER LAXATIVE PO) Take 1 tablet by mouth 2 times daily as needed  (Patient not taking: Reported on 6/22/2021)       No current facility-administered medications for this visit. No Known Allergies    Health Maintenance   Topic Date Due    Hepatitis C screen  Never done    Varicella vaccine (1 of 2 - 2-dose childhood series) Never done    Pneumococcal 0-64 years Vaccine (1 of 2 - PPSV23) Never done    COVID-19 Vaccine (1) Never done    Flu vaccine (Season Ended) 10/12/2021 (Originally 9/1/2021)    Lipid screen  01/29/2024    Cervical cancer screen  10/12/2025    DTaP/Tdap/Td vaccine (2 - Td or Tdap) 08/28/2028    HIV screen  Completed    Hepatitis A vaccine  Aged Out    Hepatitis B vaccine  Aged Out    Hib vaccine  Aged Out    Meningococcal (ACWY) vaccine  Aged Out       Subjective:     Review of Systems   Constitutional: Positive for fatigue. Negative for activity change, appetite change, fever and unexpected weight change. /78 (Site: Left Upper Arm, Position: Sitting, Cuff Size: Medium Adult)   Pulse 78   Temp 97 °F (36.1 °C) (Tympanic)   Ht 5' 7\" (1.702 m)   Wt 189 lb (85.7 kg)   SpO2 98%   BMI 29.60 kg/m²    Respiratory: Negative for cough, shortness of breath and wheezing. Cardiovascular: Negative for chest pain and palpitations. Gastrointestinal: Negative for abdominal pain, constipation, diarrhea, nausea and vomiting. Endocrine: Negative for cold intolerance and heat intolerance. Skin: Negative for color change, pallor, rash and wound. Neurological: Negative for weakness. Psychiatric/Behavioral: The patient is not nervous/anxious. Objective:     Physical Exam  Vitals and nursing note reviewed. Constitutional:       General: She is not in acute distress. Appearance: Normal appearance. She is well-developed. She is not ill-appearing.    HENT: Fractionated     Standing Status:   Future     Standing Expiration Date:   9/90/9879   [facility] Follicle Stimulating Hormone     Standing Status:   Future     Standing Expiration Date:   6/22/2022    T4, Free     Standing Status:   Future     Standing Expiration Date:   6/22/2022    TSH 3RD GENERATION     Standing Status:   Future     Standing Expiration Date:   6/22/2022    Luteinizing Hormone     Standing Status:   Future     Standing Expiration Date:   6/22/2022    Testosterone Free and Total Male     Needs obtained first thing in the morning, fasting     Standing Status:   Future     Standing Expiration Date:   6/22/2022    DHEA     Standing Status:   Future     Standing Expiration Date:   6/22/2022    T3     Standing Status:   Future     Standing Expiration Date:   6/22/2022    Cortisol     Standing Status:   Future     Standing Expiration Date:   6/22/2022     Order Specific Question:   8AM or 4PM?     Answer:   8AM    Dexamethasone     Standing Status:   Future     Standing Expiration Date:   6/22/2022     Orders Placed This Encounter   Medications    naltrexone-buPROPion (CONTRAVE) 8-90 MG per extended release tablet     Sig: WEEK 1: TAKE 1 TAB BY MOUTH IN THE AM, WEEK 2: 1 TAB 2 TIMES A DAY, WEEK 3: 2 TABS IN THE AM AND 1 IN THE PM WEEK 4: 2 TABS 2 TIMES A DAY     Dispense:  120 tablet     Refill:  0    fluticasone (FLONASE) 50 MCG/ACT nasal spray     Sig: INHALE 2 SPRAYS NASALLY TWO TIMES A DAY     Dispense:  16 g     Refill:  3    dexamethasone (DECADRON) 1 MG tablet     Sig: Take 1 tablet at 11 pm the night before planned test     Dispense:  1 tablet     Refill:  0       Patient given educational materials - see patient instructions. Discussed use, benefit, and side effects of prescribed medications. All patientquestions answered. Pt voiced understanding. Reviewed health maintenance. Instructedto continue current medications, diet and exercise. Patient agreed with treatmentplan.  Follow up as directed.      Electronically signed by Anayeli Ahn PA-C on 6/22/2021 at 2:52 PM

## 2021-06-22 NOTE — PROGRESS NOTES
Visit Information    Have you changed or started any medications since your last visit including any over-the-counter medicines, vitamins, or herbal medicines? no   Are you having any side effects from any of your medications? -  no  Have you stopped taking any of your medications? Is so, why? -  no    Have you seen any other physician or provider since your last visit? No  Have you had any other diagnostic tests since your last visit? No  Have you been seen in the emergency room and/or had an admission to a hospital since we last saw you? No  Have you had your routine dental cleaning in the past 6 months? no    Have you activated your [employer] account? If not, what are your barriers?  Yes     Patient Care Team:  Francisco Cuenca PA-C as PCP - General (Family Medicine)  Francisco Cuenca PA-C as PCP - [facility]  Zaheer Meza MD as Consulting Physician (Urology)    Medical History Review  Past Medical, Family, and Social History reviewed and  contribute to the patient presenting condition    Health Maintenance   Topic Date Due    Hepatitis C screen  Never done    Varicella vaccine (1 of 2 - 2-dose childhood series) Never done    Pneumococcal 0-64 years Vaccine (1 of 2 - PPSV23) Never done    COVID-19 Vaccine (1) Never done    Flu vaccine (Season Ended) 10/12/2021 (Originally 9/1/2021)    Lipid screen  01/29/2024    Cervical cancer screen  10/12/2025    DTaP/Tdap/Td vaccine (2 - Td or Tdap) 08/28/2028    HIV screen  Completed    Hepatitis A vaccine  Aged Out    Hepatitis B vaccine  Aged Out    Hib vaccine  Aged Out    Meningococcal (ACWY) vaccine  Aged Out

## 2021-07-06 ENCOUNTER — HOSPITAL ENCOUNTER (OUTPATIENT)
Age: 41
Setting detail: SPECIMEN
Discharge: HOME | End: 2021-07-06
Payer: COMMERCIAL

## 2021-07-06 DIAGNOSIS — R53.83 FATIGUE, UNSPECIFIED TYPE: ICD-10-CM

## 2021-07-06 DIAGNOSIS — E66.3 OVERWEIGHT (BMI 25.0-29.9): ICD-10-CM

## 2021-07-06 DIAGNOSIS — L65.9 HAIR LOSS: ICD-10-CM

## 2021-07-06 LAB
ABSOLUTE EOS #: 0.03 K/UL (ref 0–0.44)
ABSOLUTE IMMATURE GRANULOCYTE: <0.03 K/UL (ref 0–0.3)
ABSOLUTE LYMPH #: 1.56 K/UL (ref 1.1–3.7)
ABSOLUTE MONO #: 0.39 K/UL (ref 0.1–1.2)
ALBUMIN SERPL-MCNC: 4.3 G/DL (ref 3.5–5.2)
ALBUMIN/GLOBULIN RATIO: 1.5 (ref 1–2.5)
ALP BLD-CCNC: 73 U/L (ref 35–104)
ALT SERPL-CCNC: 16 U/L (ref 5–33)
ANION GAP SERPL CALCULATED.3IONS-SCNC: 12 MMOL/L (ref 9–17)
AST SERPL-CCNC: 17 U/L
BASOPHILS # BLD: 0 % (ref 0–2)
BASOPHILS ABSOLUTE: <0.03 K/UL (ref 0–0.2)
BILIRUB SERPL-MCNC: 0.39 MG/DL (ref 0.3–1.2)
BUN BLDV-MCNC: 13 MG/DL (ref 6–20)
BUN/CREAT BLD: ABNORMAL (ref 9–20)
CALCIUM SERPL-MCNC: 9.3 MG/DL (ref 8.6–10.4)
CHLORIDE BLD-SCNC: 104 MMOL/L (ref 98–107)
CO2: 26 MMOL/L (ref 20–31)
CORTISOL COLLECTION INFO: ABNORMAL
CORTISOL: 0.4 UG/DL (ref 2.7–18.4)
CREAT SERPL-MCNC: 0.65 MG/DL (ref 0.5–0.9)
DIFFERENTIAL TYPE: ABNORMAL
EOSINOPHILS RELATIVE PERCENT: 0 % (ref 1–4)
FOLLICLE STIMULATING HORMONE: 61.1 U/L (ref 1.7–21.5)
GFR AFRICAN AMERICAN: >60 ML/MIN
GFR NON-AFRICAN AMERICAN: >60 ML/MIN
GFR SERPL CREATININE-BSD FRML MDRD: ABNORMAL ML/MIN/{1.73_M2}
GFR SERPL CREATININE-BSD FRML MDRD: ABNORMAL ML/MIN/{1.73_M2}
GLUCOSE BLD-MCNC: 112 MG/DL (ref 70–99)
HCT VFR BLD CALC: 43.5 % (ref 36.3–47.1)
HEMOGLOBIN: 13.9 G/DL (ref 11.9–15.1)
IMMATURE GRANULOCYTES: 0 %
LH: 58.5 U/L (ref 1–95.6)
LYMPHOCYTES # BLD: 21 % (ref 24–43)
MCH RBC QN AUTO: 29.8 PG (ref 25.2–33.5)
MCHC RBC AUTO-ENTMCNC: 32 G/DL (ref 28.4–34.8)
MCV RBC AUTO: 93.1 FL (ref 82.6–102.9)
MONOCYTES # BLD: 5 % (ref 3–12)
NRBC AUTOMATED: 0 PER 100 WBC
PDW BLD-RTO: 12.2 % (ref 11.8–14.4)
PLATELET # BLD: 273 K/UL (ref 138–453)
PLATELET ESTIMATE: ABNORMAL
PMV BLD AUTO: 11.1 FL (ref 8.1–13.5)
POTASSIUM SERPL-SCNC: 4.6 MMOL/L (ref 3.7–5.3)
RBC # BLD: 4.67 M/UL (ref 3.95–5.11)
RBC # BLD: ABNORMAL 10*6/UL
SEG NEUTROPHILS: 74 % (ref 36–65)
SEGMENTED NEUTROPHILS ABSOLUTE COUNT: 5.4 K/UL (ref 1.5–8.1)
SEX HORMONE BINDING GLOBULIN: 57 NMOL/L (ref 30–135)
SODIUM BLD-SCNC: 142 MMOL/L (ref 135–144)
T3 TOTAL: 84 NG/DL (ref 60–181)
TESTOSTERONE FREE-NONMALE: <1 PG/ML (ref 1.3–9.2)
TESTOSTERONE TOTAL: 3 NG/DL (ref 20–70)
THYROXINE, FREE: 1.17 NG/DL (ref 0.93–1.7)
TOTAL PROTEIN: 7.2 G/DL (ref 6.4–8.3)
TSH SERPL DL<=0.05 MIU/L-ACNC: 1.55 MIU/L (ref 0.3–5)
WBC # BLD: 7.4 K/UL (ref 3.5–11.3)
WBC # BLD: ABNORMAL 10*3/UL

## 2021-07-08 LAB — CHROMOGRANIN A: 47 NG/ML (ref 0–103)

## 2021-07-09 LAB
DEXAMETHASONE: 297 NG/DL
DHEA: 1.29 NG/ML (ref 0.63–4.7)
ESTRADIOL LEVEL: 16.8 PG/ML
ESTROGEN TOTAL: 140.8 PG/ML
ESTRONE: 124 PG/ML

## 2021-07-10 ENCOUNTER — PATIENT MESSAGE (OUTPATIENT)
Dept: FAMILY MEDICINE CLINIC | Age: 41
End: 2021-07-10

## 2021-07-10 DIAGNOSIS — E66.3 OVERWEIGHT (BMI 25.0-29.9): Primary | ICD-10-CM

## 2021-07-10 DIAGNOSIS — N92.6 IRREGULAR MENSES: ICD-10-CM

## 2021-07-11 NOTE — TELEPHONE ENCOUNTER
From: James Shields  To: Arnold Ganser, PA-C  Sent: 7/10/2021 6:20 PM EDT  Subject: Test Results Question    Hello. I saw that I have a few test results that were flagged between you and Dr Roseanna luu. Should I be concerned about them, specifically my cortisol? Of course I looked it up and read that Addisons disease is associated with low cortisol.

## 2021-07-19 DIAGNOSIS — E66.3 OVERWEIGHT (BMI 25.0-29.9): ICD-10-CM

## 2021-07-19 RX ORDER — NALTREXONE HYDROCHLORIDE AND BUPROPION HYDROCHLORIDE 8; 90 MG/1; MG/1
TABLET, EXTENDED RELEASE ORAL
Qty: 120 TABLET | Refills: 0 | Status: SHIPPED | OUTPATIENT
Start: 2021-07-19 | End: 2021-08-19

## 2021-07-26 ENCOUNTER — OFFICE VISIT (OUTPATIENT)
Dept: OBGYN CLINIC | Age: 41
End: 2021-07-26
Payer: COMMERCIAL

## 2021-07-26 VITALS — BODY MASS INDEX: 29.57 KG/M2 | HEIGHT: 67 IN | WEIGHT: 188.4 LBS

## 2021-07-26 DIAGNOSIS — E28.39 PRIMARY OVARIAN INSUFFICIENCY: Primary | ICD-10-CM

## 2021-07-26 DIAGNOSIS — N95.1 HOT FLASHES DUE TO MENOPAUSE: ICD-10-CM

## 2021-07-26 PROBLEM — D3A.8 NEUROENDOCRINE TUMOR: Status: ACTIVE | Noted: 2021-07-26

## 2021-07-26 PROCEDURE — 99213 OFFICE O/P EST LOW 20 MIN: CPT | Performed by: NURSE PRACTITIONER

## 2021-07-26 NOTE — PATIENT INSTRUCTIONS
Patient Education        Learning About Calcium  What is calcium? Calcium keeps your bones and musclesincluding your hearthealthy and strong. Your body needs vitamin D to absorb calcium. People who don't get enough calcium and vitamin D throughout life have an increased chance of having thin and brittle bones (osteoporosis) in their later years. Thin and brittle bones break easily. They can lead to serious injuries. This is why it's important for you to get enough calcium and vitamin D as a child and as an adult. It helps keep your bones strong as you get older. And it protects you against possible breaks. Your body also uses vitamin D to help your muscles absorb calcium and work well. If your muscles don't get enough calcium, then they can cramp, hurt, or feel weak. You may have long-term (chronic) muscle aches and pains. How much calcium do you need? How much calcium you need each day changes as you age. Here are the recommended daily allowances (RDAs) for calcium:  · Ages 1 to 3 years: 700 milligrams  · Ages 4 to 8 years: 1,000 milligrams  · Ages 5 to 25 years: 1,300 milligrams  · Ages 23 to 48 years: 1,000 milligrams  · Males 46 to 79 years: 1,000 milligrams  · Females 46 to 79 years: 1,200 milligrams  · Ages 70 and older: 1,200 milligrams  Women who are pregnant or breastfeeding need the same amount of calcium and vitamin D as other women their age. How can you get enough calcium? Calcium is in foods such as milk, cheese, and yogurt. Vegetables like broccoli, kale, and Chinese cabbage also have it. You can get calcium if you eat the soft edible bones in canned sardines and canned salmon. Foods with added (fortified) calcium include some cereals, juices, soy drinks, and tofu. The food label will show how much of it was added. You can figure out how much calcium is in a food by looking at the percent daily value section on the nutrition facts label.  The food label assumes the daily value of calcium is 1,300 mg. So if one serving of a food has a daily value of 20% of calcium, that food has 260 mg of calcium in one serving. Some people who don't get enough calcium may need supplements. Two common calcium supplements are calcium citrate and calcium carbonate. Calcium carbonate is best absorbed when it is taken with food. Calcium citrate can be absorbed well with or without food. Spreading calcium out over the course of the day can reduce stomach upset. And your body absorbs it better when it is spread over the day. Try not to take more than 500 mg of calcium supplement at a time. Where can you learn more? Go to [email or web address] and sign in to your [employer] account. Enter S264 in the Spot[employer]ero box to learn more about \"Learning About Calcium. \"     If you do not have an account, please click on the \"Sign Up Now\" link. Current as of: December 17, 2020               Content Version: 12.9  © 2006-2021 Healthwise, Incorporated. Care instructions adapted under license by [facility] ([facility]). If you have questions about a medical condition or this instruction, always ask your healthcare professional. [place] [address] any warranty or liability for your use of this information.

## 2021-07-26 NOTE — PROGRESS NOTES
[facility]   [facility] [address] [facility],  [address]    Problem Visit      Liedy Benavidez  7/26/2021                       Primary Care Physician: Alex Blank    CC:   Chief Complaint   Patient presents with    Results     discuss hormone testing done 7/6/21 done for irregular cycles          HPI: Leidy Benavidez is a 37 y.o. female [id number] Patient's last menstrual period was 06/17/2021. The patient was seen and examined. She is here for to review labs and is complaining of irregular periods. She was dx with POI several years ago. Was on OCPs, had some issues w/vaginal lubrication and achieving orgasm and started on imvexxy and compounding hormones through [facility]. She doesn't like not knowing when she is going to have her period. Reports having about 9 periods in the last 12 months. Discussed POI and that the treatments/meds are being used to help supplement estrogen and manage symptoms of menopause. Discussing treatments and pt admits to being a smoker- vapes w/nicotine. Reviewed contraindications for estrogen use w/smoking over 27 y/o. Advised to call [facility] and review our conversation today and to discontinue estrogen. May continue Imvexxy and can use prometrium at night if having moderate to severe sx. Also discussed increasing calcium intake for bone health/protection. Pt agreeable to POC. She complains of irregular. Her periods are irregular and last 4 days. She describes them as light. Her bowel habits are regular. She denies any bloating. She denies dysuria. She denies urinary leaking. She denies vaginal discharge. She is not sexually active with single partner, contraception - none. She uses no method for contraception and is not desiring pregnancy.     REVIEW OF SYSTEMS:  Constitutional: negative fever, negative chills  HEENT: negative visual disturbances, negative headaches  Respiratory: negative dyspnea, negative cough  Cardiovascular: negative chest pain, negative palpitations  Gastrointestinal: negative abdominal pain, negative RUQ pain, negative N/V, negative diarrhea, negative constipation  Genitourinary: negative dysuria, negative vaginal discharge  Dermatological: negative rash  Hematologic: negative bruising  Immunologic/Lymphatic: negative recent illness, negative recent sick contact  Musculoskeletal: negative back pain, negative myalgias, negative arthralgias  Neurological:  negative dizziness, negative weakness  Behavior/Psych: negative depression, negative anxiety    OBSTETRICAL HISTORY:  OB History    Para Term  AB Living   2 0 0 0 2     SAB TAB Ectopic Molar Multiple Live Births   2 0 0            # Outcome Date GA Lbr Mazin/2nd Weight Sex Delivery Anes PTL Lv   2 SAB            1 SAB                PAST MEDICAL HISTORY:      Diagnosis Date    ADD (attention deficit disorder)     Anxiety     Back pain     Carpal tunnel syndrome, bilateral 2016    Chronic back pain     started pain mgmnt     Depression     Foot pain     left (d/t back)    History of cervical cancer age 17    History of colon polyps     x 1 precancerous polyp    History of gastroesophageal reflux (GERD)     \"mild\"  no medications    History of miscarriage     x 2    Hx of blood clots     right leg superficial blood clot    Hyperlipidemia     watching diet    Lumbago     Neuroendocrine tumor     OAB (overactive bladder) 2017    Obesity (BMI 30-39. 9)     PUD (peptic ulcer disease)     Sciatica     Urge incontinence        PAST SURGICAL HISTORY:                                                                    Procedure Laterality Date    BACK SURGERY  2016    Lumbar interbody fusion posterior, L5-S1    BACK SURGERY      COLONOSCOPY  2020    DILATION AND CURETTAGE OF UTERUS      EYE SURGERY      R    LEEP  age 17    OTHER SURGICAL HISTORY  2018    myelogram    MD LAMINECTOMY,>2 SGMT,LUMBAR N/A 2018    LUMBAR MICRO DISKECTOMY   L45 performed by Dereje Weiss MD at [address] [place] [address] [facility] N/A 6/25/2018    LUMBAR INTERBODY FUSION POSTERIOR L45 performed by [facility]je [facility] MD at [facility]      UPPER GASTROINTESTINAL ENDOSCOPY  05/18/2020    UPPER GASTROINTESTINAL ENDOSCOPY  05/28/2020    polypectomy    UPPER GASTROINTESTINAL ENDOSCOPY N/A 5/28/2020    EGD BIOPSY **CASE IN OR. WITH G.I. STAFF**  EGD ESOPHAGOGASTRODUODENOSCOPY performed by Suzy Rodriguez MD at [facility]    UPPER GASTROINTESTINAL ENDOSCOPY  5/28/2020    EGD CONTROL HEMORRHAGE. APC performed by Suzy Rodriguez MD at [facility]    WISDOM TOOTH EXTRACTION         MEDICATIONS:  Current Outpatient Medications   Medication Sig Dispense Refill    BIOTIN PO Take by mouth      MAGNESIUM PO Take by mouth      NONFORMULARY Estriol Estadiol Progesterone Testosterone DHEA Naltrexone Finasteride Compounding Drug from [address] 8-90 MG per extended release tablet WEEK 1: TAKE 1 TAB BY MOUTH IN THE AM, WEEK 2: 1 TAB 2 TIMES A DAY, WEEK 3: 2 TABS IN THE AM AND 1 IN THE PM WEEK 4: 2 TABS 2 TIMES A  tablet 0    fluticasone (FLONASE) 50 MCG/ACT nasal spray INHALE 2 SPRAYS NASALLY TWO TIMES A DAY 16 g 3    ibuprofen (ADVIL;MOTRIN) 800 MG tablet TAKE 1 TABLET BY MOUTH EVERY SIX HOURS WITH FOOD or milk as needed pain 30 tablet 0    solifenacin (VESICARE) 10 MG tablet Take 1 tablet by mouth daily 90 tablet 1    MYRBETRIQ 50 MG TB24 Take 50 mg by mouth daily 90 tablet 3    carisoprodol (SOMA) 250 MG tablet Take 50 mg by mouth 4 times daily.  Estradiol (IMVEXXY MAINTENANCE PACK) 10 MCG INST Place 1 suppository vaginally Twice a Week 24 each 4    amphetamine-dextroamphetamine (ADDERALL) 20 MG tablet Take 1 tablet by mouth 2 times daily for 30 days. 60 tablet 0    triamcinolone (KENALOG) 0.1 % cream Apply topically 2 times daily.  60 g 0    vitamin B-12 (CYANOCOBALAMIN) 1000 MCG tablet Take 1,000 mcg by mouth daily      Multiple Vitamins-Minerals (THERAPEUTIC MULTIVITAMIN-MINERALS) tablet Take 1 tablet by mouth daily      diphenhydrAMINE (BENADRYL) 25 MG tablet Take 25 mg by mouth every 6 hours as needed for Itching       Sennosides-Docusate Sodium (STOOL SOFTENER LAXATIVE PO) Take 1 tablet by mouth 2 times daily as needed       Lactobacillus (PROBIOTIC ACIDOPHILUS PO) Take 1 tablet by mouth daily       No current facility-administered medications for this visit. ALLERGIES:   Allergies as of 07/26/2021    (No Known Allergies)                                   VITALS:  As charted                                                                                                                                                                      PHYSICAL EXAM:   Physical Exam  Constitutional:       Appearance: Normal appearance. She is normal weight. HENT:      Head: Normocephalic. Nose: Nose normal.      Mouth/Throat:      Mouth: Mucous membranes are moist.   Eyes:      Extraocular Movements: Extraocular movements intact. Conjunctiva/sclera: Conjunctivae normal.   Pulmonary:      Effort: Pulmonary effort is normal.   Musculoskeletal:         General: Normal range of motion. Skin:     General: Skin is dry. Neurological:      General: No focal deficit present. Mental Status: She is alert and oriented to person, place, and time. Mental status is at baseline. Psychiatric:         Mood and Affect: Mood normal.         Behavior: Behavior normal.         Thought Content: Thought content normal.         Judgment: Judgment normal.           DATA:  No results found for this visit on 07/26/21. ASSESSMENT & PLAN:    Mehreen Blount is a 37 y.o. female [id number] Patient's last menstrual period was 06/17/2021.     Patient Active Problem List    Diagnosis Date Noted    Neuroendocrine tumor 07/26/2021    Urinary frequency 10/21/2020    Nocturia

## 2021-07-29 ENCOUNTER — TELEPHONE (OUTPATIENT)
Dept: OBGYN CLINIC | Age: 41
End: 2021-07-29

## 2021-07-29 RX ORDER — PROGESTERONE 100 MG/1
100 CAPSULE ORAL NIGHTLY
Qty: 30 CAPSULE | Refills: 3 | Status: SHIPPED | OUTPATIENT
Start: 2021-07-29 | End: 2021-11-15

## 2021-07-29 RX ORDER — VENLAFAXINE HYDROCHLORIDE 37.5 MG/1
37.5 CAPSULE, EXTENDED RELEASE ORAL DAILY
Qty: 30 CAPSULE | Refills: 3 | Status: SHIPPED | OUTPATIENT
Start: 2021-07-29 | End: 2021-11-15

## 2021-07-29 NOTE — TELEPHONE ENCOUNTER
36 y/o Pt calling to let Kavita SWANN-NP know that she is responding back to her [employer] message. Shesconfirming she would like to try progesterone, effexor together and please send to pharmacy asap. Pharmacy:  [facility]    Advised:  -will forward her request to he provider.

## 2021-08-03 RX ORDER — ESTRADIOL 10 UG/1
INSERT VAGINAL
Qty: 24 EACH | Refills: 0 | Status: SHIPPED | OUTPATIENT
Start: 2021-08-03 | End: 2021-10-19

## 2021-08-19 DIAGNOSIS — E66.3 OVERWEIGHT (BMI 25.0-29.9): ICD-10-CM

## 2021-08-19 RX ORDER — NALTREXONE HYDROCHLORIDE AND BUPROPION HYDROCHLORIDE 8; 90 MG/1; MG/1
TABLET, EXTENDED RELEASE ORAL
Qty: 120 TABLET | Refills: 0 | Status: SHIPPED | OUTPATIENT
Start: 2021-08-19 | End: 2021-09-20

## 2021-09-20 DIAGNOSIS — E66.3 OVERWEIGHT (BMI 25.0-29.9): ICD-10-CM

## 2021-09-20 RX ORDER — NALTREXONE HYDROCHLORIDE AND BUPROPION HYDROCHLORIDE 8; 90 MG/1; MG/1
TABLET, EXTENDED RELEASE ORAL
Qty: 120 TABLET | Refills: 0 | Status: SHIPPED | OUTPATIENT
Start: 2021-09-20 | End: 2021-09-26 | Stop reason: SDUPTHER

## 2021-09-24 ENCOUNTER — PATIENT MESSAGE (OUTPATIENT)
Dept: FAMILY MEDICINE CLINIC | Age: 41
End: 2021-09-24

## 2021-09-24 DIAGNOSIS — E66.3 OVERWEIGHT (BMI 25.0-29.9): ICD-10-CM

## 2021-09-24 NOTE — TELEPHONE ENCOUNTER
From: Yoav Leroy  To: Garcia Larose PA-C  Sent: 9/24/2021 4:48 PM EDT  Subject: Prescription Question    Hello! I was hoping you could update my contrave rx with Kareem Gomez. Your office keeps refilling the starter dose, so I only get 70 at a time, then have to go back later on for the rest. Could they please send in a rx for 120 pills?

## 2021-09-26 RX ORDER — NALTREXONE HYDROCHLORIDE AND BUPROPION HYDROCHLORIDE 8; 90 MG/1; MG/1
TABLET, EXTENDED RELEASE ORAL
Qty: 120 TABLET | Refills: 3 | Status: SHIPPED | OUTPATIENT
Start: 2021-09-26 | End: 2022-02-03

## 2021-10-08 ENCOUNTER — VIRTUAL VISIT (OUTPATIENT)
Dept: FAMILY MEDICINE CLINIC | Age: 41
End: 2021-10-08
Payer: COMMERCIAL

## 2021-10-08 DIAGNOSIS — M54.32 SCIATICA OF LEFT SIDE: ICD-10-CM

## 2021-10-08 DIAGNOSIS — M51.36 DEGENERATIVE DISC DISEASE, LUMBAR: Primary | ICD-10-CM

## 2021-10-08 PROCEDURE — 99213 OFFICE O/P EST LOW 20 MIN: CPT | Performed by: NURSE PRACTITIONER

## 2021-10-08 RX ORDER — SODIUM FLUORIDE 1.1 G/100G
GEL, DENTIFRICE ORAL
COMMUNITY
Start: 2021-07-15

## 2021-10-08 NOTE — PROGRESS NOTES
Rajesh Gunn (:  1980) is a 37 y.o. female,Established patient, here for evaluation of the following chief complaint(s): Back Pain (renew FMLA)         ASSESSMENT/PLAN:  1. Degenerative disc disease, lumbar  Will update FMLA and have him start date 2021 and will fax on Monday  Continue with neurosurgeon and home stretches/physical therapy  Continue other same medications  Continue weight loss efforts  2. Sciatica of left side  See above    Return if symptoms worsen or fail to improve. SUBJECTIVE/OBJECTIVE:  HPI  Patient calling in today to have FMLA updated for low back pain and sciatica. She works for [employer] and has to have this done every 6 months. She rarely has to call off and is only had to call off 1 time in the last 3 months. She would like this backdated to 2021 as it  in 2021 but had not called off since . She had a recent MRI of her lumbar spine earlier this year ordered by her neurosurgeon and was told there were no new changes. She also recently completed physical therapy and did find this to be somewhat helpful in reducing her back spasms. She is continuing with home exercises at this time as well as weight loss efforts also relaxers and anti-inflammatories.   She otherwise has no other concerns    Review of Systems    Patient-Reported Vitals 10/8/2021   Patient-Reported Weight 188   Patient-Reported Height 5'7        Physical Exam    [INSTRUCTIONS:  \"[x]\" Indicates a positive item  \"[]\" Indicates a negative item  -- DELETE ALL ITEMS NOT EXAMINED]    Constitutional: [x] Appears well-developed and well-nourished [x] No apparent distress      [] Abnormal -     Mental status: [x] Alert and awake  [x] Oriented to person/place/time [x] Able to follow commands    [] Abnormal -     Eyes:   EOM    [x]  Normal    [] Abnormal -   Sclera  [x]  Normal    [] Abnormal -          Discharge [x]  None visible   [] Abnormal -     HENT: [x] Normocephalic, atraumatic  [] Abnormal -   [x] Mouth/Throat: Mucous membranes are moist    External Ears [x] Normal  [] Abnormal -    Neck: [x] No visualized mass [] Abnormal -     Pulmonary/Chest: [x] Respiratory effort normal   [x] No visualized signs of difficulty breathing or respiratory distress        [] Abnormal -      Musculoskeletal:   [x] Normal gait with no signs of ataxia         [x] Normal range of motion of neck        [] Abnormal -     Neurological:        [x] No Facial Asymmetry (Cranial nerve 7 motor function) (limited exam due to video visit)          [x] No gaze palsy        [] Abnormal -          Skin:        [x] No significant exanthematous lesions or discoloration noted on facial skin         [] Abnormal -            Psychiatric:       [x] Normal Affect [] Abnormal -        [x] No Hallucinations    Other pertinent observable physical exam findings:-        Victor Hugo Corona, was evaluated through a synchronous (real-time) audio-video encounter. The patient (or guardian if applicable) is aware that this is a billable service. Verbal consent to proceed has been obtained within the past 12 months. The visit was conducted pursuant to the emergency declaration under the [address] [place] [address], [address] [place] [address] authority and the [employer] [place] Act. Patient identification was verified, and a caregiver was present when appropriate. The patient was located in a state where the provider was credentialed to provide care. An electronic signature was used to authenticate this note.     --Kike Aranda, APRN - CNP

## 2021-10-12 ASSESSMENT — ENCOUNTER SYMPTOMS
DIARRHEA: 0
ABDOMINAL PAIN: 0
ABDOMINAL DISTENTION: 0
BACK PAIN: 0
SHORTNESS OF BREATH: 0
COUGH: 0
CONSTIPATION: 0

## 2021-10-13 ENCOUNTER — HOSPITAL ENCOUNTER (OUTPATIENT)
Age: 41
Setting detail: SPECIMEN
Discharge: HOME | End: 2021-10-13
Payer: COMMERCIAL

## 2021-10-13 ENCOUNTER — OFFICE VISIT (OUTPATIENT)
Dept: OBGYN CLINIC | Age: 41
End: 2021-10-13
Payer: COMMERCIAL

## 2021-10-13 VITALS
SYSTOLIC BLOOD PRESSURE: 122 MMHG | DIASTOLIC BLOOD PRESSURE: 74 MMHG | WEIGHT: 189 LBS | BODY MASS INDEX: 29.66 KG/M2 | HEIGHT: 67 IN

## 2021-10-13 DIAGNOSIS — Z12.31 ENCOUNTER FOR SCREENING MAMMOGRAM FOR BREAST CANCER: ICD-10-CM

## 2021-10-13 DIAGNOSIS — E28.39 PRIMARY OVARIAN INSUFFICIENCY: ICD-10-CM

## 2021-10-13 DIAGNOSIS — Z01.419 ENCOUNTER FOR GYNECOLOGICAL EXAMINATION: Primary | ICD-10-CM

## 2021-10-13 PROCEDURE — 99396 PREV VISIT EST AGE 40-64: CPT | Performed by: NURSE PRACTITIONER

## 2021-10-13 NOTE — PROGRESS NOTES
[facility] PHYSICIANS  [facility] [place] [address]  [address]  [address]  Dept: [phone]  DATE OF VISIT:  10/12/21        History and Physical    Victor Hugo Corona    :  1980  CHIEF COMPLAINT:  No chief complaint on file. HPI :       Victor Hugo Corona is a 37 y.o. female presents for her annual exam.   Works at [employer]- 8 years. no children. Walks often, working on eating healthy. Menarche 16. She reports still having periods occasionally- has POI. Went 3 months without a cycle and then had a 2 day cycle. Mild hot flushes or vaginal dryness- using Imvexxy. Hx elevated FSH. Uses nothing for contraception. ? Hot flashes vs. Just being hot? Imvexxy helping.  _____________________________________________________________________  Past Medical History:   Diagnosis Date    ADD (attention deficit disorder)     Anxiety     Back pain     Carpal tunnel syndrome, bilateral 2016    Chronic back pain     started pain mgmnt     Depression     Foot pain     left (d/t back)    History of cervical cancer age 17    History of colon polyps     x 1 precancerous polyp    History of gastroesophageal reflux (GERD)     \"mild\"  no medications    History of miscarriage     x 2    Hx of blood clots     right leg superficial blood clot    Hyperlipidemia     watching diet    Lumbago     Neuroendocrine tumor     OAB (overactive bladder) 2017    Obesity (BMI 30-39. 9)     PUD (peptic ulcer disease)     Sciatica     Urge incontinence                                                                    Past Surgical History:   Procedure Laterality Date    BACK SURGERY  2016    Lumbar interbody fusion posterior, L5-S1, had 5 surgeries total    COLONOSCOPY  2020    DILATION AND CURETTAGE OF UTERUS      EYE SURGERY      R    LEEP  age 17    OTHER SURGICAL HISTORY  2018    myelogram    [place] LAMINECTOMY,>2 SGMT,LUMBAR N/A 2018    LUMBAR MICRO DISKECTOMY   L45 performed by Frances Molina MD at [address] [place] [address] [facility]    LUMBAR INTERBODY FUSION POSTERIOR L45 performed by [facility] MD at [facility]      UPPER GASTROINTESTINAL ENDOSCOPY  2020    UPPER GASTROINTESTINAL ENDOSCOPY  2020    polypectomy    UPPER GASTROINTESTINAL ENDOSCOPY N/A 2020    EGD BIOPSY **CASE IN OR. WITH G.I. STAFF**  EGD ESOPHAGOGASTRODUODENOSCOPY performed by Oralia Weston MD at \[facility]"" Endoscopy    UPPER GASTROINTESTINAL ENDOSCOPY  2020    EGD CONTROL HEMORRHAGE. APC performed by Oralia Weston MD at [facility] Endoscopy    WISDOM TOOTH EXTRACTION       Family History   Problem Relation Age of Onset    High Cholesterol Mother     Other Mother         premature menopause    Hypertension Father     Diabetes Neg Hx     Cancer Neg Hx      Social History     Tobacco Use   Smoking Status Current Every Day Smoker    Packs/day: 0.50    Years: 15.00    Pack years: 7.50    Types: Cigarettes    Last attempt to quit: 2016    Years since quittin.2   Smokeless Tobacco Never Used     Social History     Substance and Sexual Activity   Alcohol Use Yes    Types: 1 Glasses of wine per week    Comment: Maybe a couple drinks a month     Current Outpatient Medications   Medication Sig Dispense Refill    DENTA 5000 PLUS 1.1 % CREA BRUSH WITH THIN RIBBON FOR 2 MINUTES AT BEDTIME. DO NOT EAT OR DRINK FOR 30 MINUTES AFTER.       solifenacin (VESICARE) 10 MG tablet TAKE 1 TABLET DAILY 90 tablet 1    naltrexone-buPROPion (CONTRAVE) 8-90 MG per extended release tablet Take 2 tablets po bid 120 tablet 3    IMVEXXY MAINTENANCE PACK 10 MCG INST INSERT 1 SUPPOSITORY       VAGINALLY 2 TIMES A WEEK 24 each 0    venlafaxine (EFFEXOR XR) 37.5 MG extended release capsule Take 1 capsule by mouth daily 30 capsule 3    progesterone (PROMETRIUM) 100 MG CAPS capsule Take 1 capsule by mouth nightly 30 capsule 3    BIOTIN PO Take by mouth      MAGNESIUM PO Take by mouth      NONFORMULARY Estriol Estadiol Progesterone Testosterone DHEA Naltrexone Finasteride Compounding Drug from [facility]      fluticasone (FLONASE) 50 MCG/ACT nasal spray INHALE 2 SPRAYS NASALLY TWO TIMES A DAY 16 g 3    ibuprofen (ADVIL;MOTRIN) 800 MG tablet TAKE 1 TABLET BY MOUTH EVERY SIX HOURS WITH FOOD or milk as needed pain 30 tablet 0    MYRBETRIQ 50 MG TB24 Take 50 mg by mouth daily 90 tablet 3    carisoprodol (SOMA) 250 MG tablet Take 50 mg by mouth 4 times daily.  amphetamine-dextroamphetamine (ADDERALL) 20 MG tablet Take 1 tablet by mouth 2 times daily for 30 days. 60 tablet 0    triamcinolone (KENALOG) 0.1 % cream Apply topically 2 times daily. 60 g 0    vitamin B-12 (CYANOCOBALAMIN) 1000 MCG tablet Take 1,000 mcg by mouth daily      Multiple Vitamins-Minerals (THERAPEUTIC MULTIVITAMIN-MINERALS) tablet Take 1 tablet by mouth daily      diphenhydrAMINE (BENADRYL) 25 MG tablet Take 25 mg by mouth every 6 hours as needed for Itching       Sennosides-Docusate Sodium (STOOL SOFTENER LAXATIVE PO) Take 1 tablet by mouth 2 times daily as needed       Lactobacillus (PROBIOTIC ACIDOPHILUS PO) Take 1 tablet by mouth daily       No current facility-administered medications for this visit. Allergies:  Patient has no known allergies. Gynecologic History:  No LMP recorded. Patient is perimenopausal.  Sexually Active:Yes  Partners: male/monogamous  Dyspareunia: none  STD History: No  Pap Smear History: 10.12.20- wnl  Mammogram: ordered  Colonoscopy: NA  Fam Hx Breast, Ovarian, Colorectal Ca: denies    OB History    Para Term  AB Living   2 0 0 0 2 0   SAB TAB Ectopic Molar Multiple Live Births   2 0 0 0 0 0       Review of Systems   Constitutional: Negative for appetite change and fatigue. HENT: Negative for congestion and hearing loss.     Eyes: Negative for visual disturbance. Respiratory: Negative for cough and shortness of breath. Cardiovascular: Negative for chest pain and palpitations. Gastrointestinal: Negative for abdominal distention, abdominal pain, constipation and diarrhea. Genitourinary: Negative for flank pain, frequency, menstrual problem, pelvic pain and vaginal discharge. Musculoskeletal: Negative for back pain. Neurological: Negative for syncope and headaches. Psychiatric/Behavioral: Negative for behavioral problems. There were no vitals taken for this visit. Physical Exam  Constitutional:       Appearance: She is well-developed. HENT:      Head: Normocephalic. Eyes:      Extraocular Movements: Extraocular movements intact. Conjunctiva/sclera: Conjunctivae normal.   Neck:      Thyroid: No thyromegaly. Trachea: No tracheal deviation. Pulmonary:      Effort: Pulmonary effort is normal. No respiratory distress. Chest:      Breasts: Breasts are symmetrical.         Right: No inverted nipple. Left: No inverted nipple, mass, nipple discharge, skin change or tenderness. Abdominal:      General: There is no distension. Palpations: Abdomen is soft. There is no mass. Tenderness: There is no abdominal tenderness. Genitourinary:     Labia:         Right: No rash or lesion. Left: No rash or lesion. Vagina: No vaginal discharge or tenderness. Cervix: No cervical motion tenderness, discharge or friability. Uterus: Not deviated, not enlarged and not fixed. Adnexa:         Right: No mass, tenderness or fullness. Left: No mass, tenderness or fullness. Musculoskeletal:         General: No tenderness. Normal range of motion. Cervical back: Normal range of motion. Skin:     General: Skin is warm and dry. Neurological:      General: No focal deficit present. Mental Status: She is alert and oriented to person, place, and time. Mental status is at baseline. Psychiatric:         Mood and Affect: Mood normal.         Behavior: Behavior normal.         Thought Content: Thought content normal.         Judgment: Judgment normal.                 ASSESSMENT:    37 y.o. Female; Annual   Diagnosis Orders   1. Encounter for gynecological examination  PAP SMEAR   2. Encounter for screening mammogram for breast cancer  KALEB DIGITAL SCREEN W OR WO CAD BILATERAL     No follow-ups on file. Hereditary Breast, Ovarian, Colon and Uterine Cancer screening Done. Tobacco & Secondary smoke risks reviewed; instructed on cessation and avoidance    - Pap collected per ASCCP guidelines.  -Discussed menopausal symptoms, HRT, incontinence. - Screening mammogram discussed and advised yearly if normalstarting at age 37.  - Calcium and Vitamin D dosing reviewed. - Colonoscopy screening reviewed. - General diet and exercise reviewed. - Routine health maintenance per patients PCP.     Electronically signed by SHREE Lam CNP on 10/12/21 at 8:38 PM EDT  [address] [facility]

## 2021-10-20 LAB — CYTOLOGY REPORT: NORMAL

## 2021-10-23 LAB
HPV SAMPLE: NORMAL
HPV, GENOTYPE 16: NOT DETECTED
HPV, GENOTYPE 18: NOT DETECTED
HPV, HIGH RISK OTHER: NOT DETECTED
HPV, INTERPRETATION: NORMAL
SPECIMEN DESCRIPTION: NORMAL

## 2021-10-27 RX ORDER — ESTRADIOL 10 UG/1
INSERT VAGINAL
Qty: 8 EACH | Refills: 11 | Status: SHIPPED | OUTPATIENT
Start: 2021-10-27 | End: 2021-11-03 | Stop reason: SDUPTHER

## 2021-11-03 RX ORDER — ESTRADIOL 10 UG/1
INSERT VAGINAL
Qty: 24 EACH | Refills: 3 | Status: SHIPPED | OUTPATIENT
Start: 2021-11-03 | End: 2021-11-18

## 2021-11-08 ENCOUNTER — OFFICE VISIT (OUTPATIENT)
Dept: PODIATRY | Age: 41
End: 2021-11-08
Payer: COMMERCIAL

## 2021-11-08 VITALS — HEIGHT: 67 IN | BODY MASS INDEX: 29.66 KG/M2 | WEIGHT: 189 LBS | RESPIRATION RATE: 16 BRPM

## 2021-11-08 DIAGNOSIS — M79.604 PAIN IN BOTH LOWER EXTREMITIES: ICD-10-CM

## 2021-11-08 DIAGNOSIS — M72.2 PLANTAR FASCIITIS, BILATERAL: Primary | ICD-10-CM

## 2021-11-08 DIAGNOSIS — M79.605 PAIN IN BOTH LOWER EXTREMITIES: ICD-10-CM

## 2021-11-08 DIAGNOSIS — Q66.70 PES CAVUS: ICD-10-CM

## 2021-11-08 PROCEDURE — 99203 OFFICE O/P NEW LOW 30 MIN: CPT | Performed by: PODIATRIST

## 2021-11-08 PROCEDURE — L3020 FOOT LONGITUD/METATARSAL SUP: HCPCS | Performed by: PODIATRIST

## 2021-11-08 NOTE — PROGRESS NOTES
SHREE Lam CNP   Calcium Carb-Cholecalciferol (CALCIUM 500 + D3 PO) Take by mouth   Yes Historical Provider, MD   DENTA 5000 PLUS 1.1 % CREA BRUSH WITH THIN RIBBON FOR 2 MINUTES AT BEDTIME. DO NOT EAT OR DRINK FOR 30 MINUTES AFTER. 7/15/21  Yes Historical Provider, MD   solifenacin (VESICARE) 10 MG tablet TAKE 1 TABLET DAILY 10/4/21  Yes Inder Hernandez MD   naltrexone-buPROPion (CONTRAVE) 8-90 MG per extended release tablet Take 2 tablets po bid 9/26/21  Yes Renee Valdez PA-C   venlafaxine (EFFEXOR XR) 37.5 MG extended release capsule Take 1 capsule by mouth daily 7/29/21  Yes SHREE Costa CNP   progesterone (PROMETRIUM) 100 MG CAPS capsule Take 1 capsule by mouth nightly 7/29/21  Yes SHREE Costa CNP   BIOTIN PO Take by mouth   Yes Historical Provider, MD   MAGNESIUM PO Take by mouth   Yes Historical Provider, MD   fluticasone (FLONASE) 50 MCG/ACT nasal spray INHALE 2 SPRAYS NASALLY TWO TIMES A DAY 6/22/21  Yes Renee Valdez PA-C   ibuprofen (ADVIL;MOTRIN) 800 MG tablet TAKE 1 TABLET BY MOUTH EVERY SIX HOURS WITH FOOD or milk as needed pain 4/21/21  Yes Renee Valdez PA-C   MYRBETRIQ 50 MG TB24 Take 50 mg by mouth daily 1/20/21  Yes Inder Hernandez MD   carisoprodol (SOMA) 250 MG tablet Take 50 mg by mouth 4 times daily.    Yes Historical Provider, MD   vitamin B-12 (CYANOCOBALAMIN) 1000 MCG tablet Take 1,000 mcg by mouth daily   Yes Historical Provider, MD   Multiple Vitamins-Minerals (THERAPEUTIC MULTIVITAMIN-MINERALS) tablet Take 1 tablet by mouth daily   Yes Historical Provider, MD   Lactobacillus (PROBIOTIC ACIDOPHILUS PO) Take 1 tablet by mouth daily   Yes Historical Provider, MD   NONFORMULARY Estriol Estadiol Progesterone Testosterone DHEA Naltrexone Finasteride Compounding Drug from [facility]  Patient not taking: Reported on 10/13/2021    Historical Provider, MD   amphetamine-dextroamphetamine (ADDERALL) 20 MG tablet Take 1 tablet by mouth 2 times daily for 30 days. 10/24/19 7/26/21  Renee Valdez PA-C   triamcinolone (KENALOG) 0.1 % cream Apply topically 2 times daily. 18   Renee Valdez PA-C   diphenhydrAMINE (BENADRYL) 25 MG tablet Take 25 mg by mouth every 6 hours as needed for Itching     Historical Provider, MD   Sennosides-Docusate Sodium (STOOL SOFTENER LAXATIVE PO) Take 1 tablet by mouth 2 times daily as needed     Historical Provider, MD       Past Surgical History:   Procedure Laterality Date    BACK SURGERY  2016    Lumbar interbody fusion posterior, L5-S1, had 5 surgeries total    COLONOSCOPY  2020    DILATION AND CURETTAGE OF UTERUS      EYE SURGERY      R    LEEP  age 13    OTHER SURGICAL HISTORY  2018    myelogram    [place] LAMINECTOMY,>2 SGMT,LUMBAR N/A 2018    LUMBAR MICRO DISKECTOMY   L45 performed by Zhao Perez [place] at [address] [place] N/A 2018    LUMBAR INTERBODY FUSION POSTERIOR L45 performed by Zhao Perez MD at [address] ENDOSCOPY  2020    UPPER GASTROINTESTINAL ENDOSCOPY  2020    polypectomy    UPPER GASTROINTESTINAL ENDOSCOPY N/A 2020    EGD BIOPSY **CASE IN OR. WITH G.I. STAFF**  EGD ESOPHAGOGASTRODUODENOSCOPY performed by Tristian Garza MD at [address] [place] [address]  2020    EGD CONTROL HEMORRHAGE.  APC performed by Tristian Garza MD at [facility] Endoscopy    WISDOM TOOTH EXTRACTION         Family History   Problem Relation Age of Onset    High Cholesterol Mother     Other Mother         premature menopause    Hypertension Father     Diabetes Neg Hx     Cancer Neg Hx        Social History     Tobacco Use    Smoking status: Current Every Day Smoker     Packs/day: 0.50     Years: 15.00     Pack years: 7.50     Types: Cigarettes     Last attempt to quit: 2016     Years since quittin.3    Smokeless tobacco: Never Used   Substance Use Topics    Alcohol use: Yes     Types: 1 Glasses of wine per week     Comment: Maybe a couple drinks a month       Review of Systems    Review of Systems:   History obtained from chart review and the patient  General ROS: negative for - chills, fatigue, fever, night sweats or weight gain  Constitutional: Negative for chills, diaphoresis, fatigue, fever and unexpected weight change. Musculoskeletal: Positive for arthralgias, gait problem and joint swelling. Neurological ROS: negative for - behavioral changes, confusion, headaches or seizures. Negative for weakness and numbness. Dermatological ROS: negative for - mole changes, rash  Cardiovascular: Negative for leg swelling. Gastrointestinal: Negative for constipation, diarrhea, nausea and vomiting. Lower Extremity Physical Examination:   Vitals:   Vitals:    11/08/21 1258   Resp: 16     General: AAO x 3 in NAD. Dermatologic Exam:  Skin lesion/ulceration Absent . Skin No rashes or nodules noted. .       Musculoskeletal:     1st MPJ ROM decreased, Bilateral.  Muscle strength 5/5, Bilateral. POP of the right and left plantar medial calcaneal tuberosity. Pain increased with Dorsiflexion of the right and left lesser toes. Negative pain on compression of the calcaneus bilaterally Medial longitudinal arch, Bilateral WNL.   Ankle ROM WNL,Bilateral.    Dorsally contracted digits absent digits 1-5 Bilateral.     Vascular: DP and PT pulses palpable 2/4, Bilateral.  CFT <3 seconds, Bilateral.  Hair growth present to the level of the digits, Bilateral.  Edema absent, Bilateral.  Varicosities absent, Bilateral. Erythema absent, Bilateral    Neurological: Sensation intact to light touch to level of digits, Bilateral.  Protective sensation intact 10/10 sites via 5.07/10g Revloc-Abbi Monofilament, Bilateral.  negative Tinel's, Bilateral.  negative Valleix sign, Bilateral.      Integument: Warm, dry, supple, Bilateral. Open lesion absent, Bilateral.  Interdigital maceration absent to web spaces 1-4, Bilateral.  Nails are normal in length, thickness and color 1-5 bilateral.  Fissures absent, Bilateral.           Asessment: Patient is a 37 y.o. female with:    Diagnosis Orders   1. Plantar fasciitis, bilateral  MD FOOT LONGITUD/METATARSAL SUP   2. Pain in both lower extremities  MD FOOT LONGITUD/METATARSAL SUP   3. Pes cavus  MD FOOT LONGITUD/METATARSAL SUP         Plan: Patient examined and evaluated. Current condition and treatment options discussed in detail. Discussed conservative and surgical options with the patient. Patient was casted for custom molded orthotics today. I feel that these appliances are medically necessary for my patients well being and in my opinion are both reasonable and necessary to the accepted medical practice in the treatment of the above conditions. Custom molded orthotics prevent the over pronation which is leading to excessive tension of the plantar fascia. Abnormal foot/leg functions demands mechanical, functional protections and control. Without custom molded orthotics, the patient may develop chronic pain in her feet. Later on in life, the patient may develop ankle, shin, knee and hip pain as well. In trial usage of felt pads with Lo-Dye ankle strapping to mimic the effects of the orthotics, the patient responded with reduced symptoms and better foot function. Description of the devices: These devices are Root biomechanical orthotic devices made of a plastic polymer with a leather or Spenco-type top cover. These appliances control biomechanical aberrations of the patients feet and accommodate painful areas. Orthotics are not intended to be worn in lieu of shoes, but are removable devices formed from casts of the patients feet and then sent to an independent laboratory for fabrication.      Due to the nature of my patients condition, I feel that this therapy is necessary indefinitely, as this is a form of continuous therapy. This is NOT a convenience item. It is my opinion that these devices will prevent the need for costly hospital surgery, further pain and discomfort. The total fee has been itemized to include biomechanical examination, casting of the feet and actual fabrication by the outside laboratory. Patient Instructions: Plantar Fasciitis    Plantar Fasciitis is inflammation of the long fibrous band on the bottom of the foot (plantar fascia), especially in the area of its attachment to the heel bone (calcaneus). The plantar fascia is also intimately related to the Achilles tendon and therefore stretching of the calf muscles is also important for treatment. 1. Stretching: Perform 3-4 times daily, 2-3 minutes per side      -Before getting out of bed, place a towel around the ball of your foot and       pull back, holding for 30 seconds. Repeat with other foot.      -Place a tennis ball on the floor. Roll the tennis ball under your foot for      10-15 minutes. Repeat with other foot. -Perform calf stretches: stand facing a wall with your hands on the wall,      place one leg a step behind the other. Keeping your back heel on the      floor, bend your front knee (lean into the wall), holding for 30 seconds. Repeat with other leg. 2. Icing: Perform 2-3 times daily      -Place a 12 oz plastic water bottle in the freezer. Once frozen, remove     and roll the bottle under your foot for 10-15 min. 3. Anti-inflammatory Medication      -Begin daily regimen of anti-inflammatory medication (Ibuprofen,       Naproxen, Naprosyn) as discussed during your visit. Verbal and written instructions given to patient. Contact office with any questions/problems/concerns. RTC in 1month(s).     11/8/2021    Electronically signed by Braxton Mckinnon DPM on 11/8/2021 at 1:02 PM  11/8/2021

## 2021-11-18 ENCOUNTER — OFFICE VISIT (OUTPATIENT)
Dept: FAMILY MEDICINE CLINIC | Age: 41
End: 2021-11-18
Payer: COMMERCIAL

## 2021-11-18 VITALS
DIASTOLIC BLOOD PRESSURE: 68 MMHG | HEIGHT: 67 IN | WEIGHT: 193 LBS | BODY MASS INDEX: 30.29 KG/M2 | HEART RATE: 71 BPM | SYSTOLIC BLOOD PRESSURE: 128 MMHG | TEMPERATURE: 97.8 F | OXYGEN SATURATION: 98 %

## 2021-11-18 DIAGNOSIS — K14.6 TONGUE PAIN: Primary | ICD-10-CM

## 2021-11-18 DIAGNOSIS — H92.02 LEFT EAR PAIN: ICD-10-CM

## 2021-11-18 PROCEDURE — 99213 OFFICE O/P EST LOW 20 MIN: CPT | Performed by: PHYSICIAN ASSISTANT

## 2021-11-18 ASSESSMENT — ENCOUNTER SYMPTOMS
WHEEZING: 0
SORE THROAT: 0
SHORTNESS OF BREATH: 0
COLOR CHANGE: 0
COUGH: 0

## 2021-11-18 NOTE — PROGRESS NOTES
[address] [facility]  [address] [facility] [address] [address]  Dept: [phone]  Dept Fax: [phone]    Geraldo Finch is a 37 y.o. female who presents today for her medical conditions/complaintsas noted below. Geraldo Finch is c/o of   Chief Complaint   Patient presents with    Oral Pain     burning on the end of her tongue    Otalgia     left ear, feels like there is water in her ear    Back Pain     medication refilled- from neuro, doing well         HPI:     HPI    Patient states 2 months ago she started noticing that her tongue has been very sensitive. She has trouble with salt and rough textures hurting her tongue. Spices also but she tends not to eat them much. Symptoms worse on the left side and the front of the tongue. Temperature differences don't bother her. Symptom is not constant, just comes and goes  She does have known dry mouth. Her dentist has given her toothpaste and biotene for that. Her mother also has this. Left ear also hurting her the last month. Suspect may be wax impaction. Patient states she has been using Soma 250mg from Dr. Ksenia Ferrell. She was mainly using this during PT but now done with that. She still notes that she flares with tension in the low back. Typically taking 1-2 tablets maybe 2 times a week. She states overall doing well.  Back pain well controlled    No results found for: LABA1C          ( goal A1Cis < 7)   No results found for: LABMICR  LDL Cholesterol (mg/dL)   Date Value   01/29/2019 100   05/18/2018 145 (H)   01/20/2017 130       (goal LDL is <100)   AST (U/L)   Date Value   07/06/2021 17     ALT (U/L)   Date Value   07/06/2021 16     BUN (mg/dL)   Date Value   07/06/2021 13     BP Readings from Last 3 Encounters:   11/18/21 128/68   10/13/21 122/74   06/22/21 118/78          (goal 120/80)    Past Medical History:   Diagnosis Date    ADD (attention deficit disorder)     Anxiety     Back pain     Carpal tunnel syndrome, bilateral 05/19/2016    Chronic back pain     started pain mgmnt 2/218    Depression     Foot pain     left (d/t back)    History of cervical cancer age 17    History of colon polyps     x 1 precancerous polyp    History of gastroesophageal reflux (GERD)     \"mild\"  no medications    History of miscarriage     x 2    Hx of blood clots     right leg superficial blood clot    Hyperlipidemia     watching diet    Lumbago     Neuroendocrine tumor     OAB (overactive bladder) 7/12/2017    Obesity (BMI 30-39. 9)     PUD (peptic ulcer disease)     Sciatica     Urge incontinence       Past Surgical History:   Procedure Laterality Date    BACK SURGERY  09/19/2016    Lumbar interbody fusion posterior, L5-S1, had 5 surgeries total    COLONOSCOPY  05/18/2020    DILATION AND CURETTAGE OF UTERUS      EYE SURGERY      R    LEEP  age 13    OTHER SURGICAL HISTORY  03/26/2018    myelogram    [place] LAMINECTOMY,>2 SGMT,LUMBAR N/A 04/23/2018    LUMBAR MICRO DISKECTOMY   L45 performed by Josselin Neal MD at [address] [place] [address] N/A 06/25/2018    LUMBAR INTERBODY FUSION POSTERIOR L45 performed [facility] at [facility]      UPPER GASTROINTESTINAL ENDOSCOPY  05/18/2020    UPPER GASTROINTESTINAL ENDOSCOPY  05/28/2020    polypectomy    UPPER GASTROINTESTINAL ENDOSCOPY N/A 05/28/2020    EGD BIOPSY **CASE IN OR. WITH G.I. STAFF**  EGD ESOPHAGOGASTRODUODENOSCOPY performed by Nathalie Parr MD at [address] [place] [address] ENDOSCOPY  05/28/2020    EGD CONTROL HEMORRHAGE.  APC performed by Nathalie Parr MD at [facility] Endoscopy    WISDOM TOOTH EXTRACTION         Family History   Problem Relation Age of Onset    High Cholesterol Mother     Other Mother         premature menopause    Hypertension Father     Diabetes Neg Hx     Cancer Neg Hx        Social History     Tobacco Use    Smoking status: Current Every Day Smoker     Packs/day: 0.50     Years: 15.00     Pack years: 7.50     Types: Cigarettes     Last attempt to quit: 2016     Years since quittin.3    Smokeless tobacco: Never Used   Substance Use Topics    Alcohol use: Yes     Types: 1 Glasses of wine per week     Comment: Maybe a couple drinks a month      Current Outpatient Medications   Medication Sig Dispense Refill    nystatin (MYCOSTATIN) 325825 UNIT/ML suspension Take 5 mLs by mouth 4 times daily for 7 days 1 each 0    progesterone (PROMETRIUM) 100 MG CAPS capsule TAKE 1 CAPSULE BY MOUTH EVERY DAY AT NIGHT 30 capsule 6    venlafaxine (EFFEXOR XR) 37.5 MG extended release capsule TAKE 1 CAPSULE BY MOUTH EVERY DAY 30 capsule 6    Calcium Carb-Cholecalciferol (CALCIUM 500 + D3 PO) Take by mouth      DENTA 5000 PLUS 1.1 % CREA BRUSH WITH THIN RIBBON FOR 2 MINUTES AT BEDTIME. DO NOT EAT OR DRINK FOR 30 MINUTES AFTER.  solifenacin (VESICARE) 10 MG tablet TAKE 1 TABLET DAILY 90 tablet 1    naltrexone-buPROPion (CONTRAVE) 8-90 MG per extended release tablet Take 2 tablets po bid 120 tablet 3    BIOTIN PO Take by mouth      MAGNESIUM PO Take by mouth      fluticasone (FLONASE) 50 MCG/ACT nasal spray INHALE 2 SPRAYS NASALLY TWO TIMES A DAY 16 g 3    ibuprofen (ADVIL;MOTRIN) 800 MG tablet TAKE 1 TABLET BY MOUTH EVERY SIX HOURS WITH FOOD or milk as needed pain 30 tablet 0    MYRBETRIQ 50 MG TB24 Take 50 mg by mouth daily 90 tablet 3    carisoprodol (SOMA) 250 MG tablet Take 50 mg by mouth 4 times daily.  amphetamine-dextroamphetamine (ADDERALL) 20 MG tablet Take 1 tablet by mouth 2 times daily for 30 days.  60 tablet 0    vitamin B-12 (CYANOCOBALAMIN) 1000 MCG tablet Take 1,000 mcg by mouth daily      Multiple Vitamins-Minerals (THERAPEUTIC MULTIVITAMIN-MINERALS) tablet Take 1 tablet by mouth daily      Lactobacillus (PROBIOTIC ACIDOPHILUS PO) Take 1 tablet by mouth daily       No current facility-administered medications for this visit. No Known Allergies    Health Maintenance   Topic Date Due    Hepatitis C screen  Never done    Varicella vaccine (1 of 2 - 2-dose childhood series) Never done    Pneumococcal 0-64 years Vaccine (1 of 2 - PPSV23) Never done    Diabetes screen  03/22/2021    Flu vaccine (1) Never done    COVID-19 Vaccine (2 - Booster for Pancho series) 09/12/2021    Lipid screen  01/29/2024    Cervical cancer screen  10/13/2026    DTaP/Tdap/Td vaccine (2 - Td or Tdap) 08/28/2028    HIV screen  Completed    Hepatitis A vaccine  Aged Out    Hepatitis B vaccine  Aged Out    Hib vaccine  Aged Out    Meningococcal (ACWY) vaccine  Aged Out       Subjective:     Review of Systems   Constitutional: Negative for activity change, appetite change, fatigue, fever and unexpected weight change. /68 (Site: Left Upper Arm, Position: Sitting, Cuff Size: Large Adult)   Pulse 71   Temp 97.8 °F (36.6 °C) (Tympanic)   Ht 5' 7\" (1.702 m)   Wt 193 lb (87.5 kg)   SpO2 98%   BMI 30.23 kg/m²    HENT: Negative for congestion, mouth sores and sore throat. Respiratory: Negative for cough, shortness of breath and wheezing. Cardiovascular: Negative for chest pain and palpitations. Skin: Negative for color change, pallor, rash and wound. Neurological: Negative for weakness. Psychiatric/Behavioral: Negative for sleep disturbance. The patient is not nervous/anxious. Objective:     Physical Exam  Vitals and nursing note reviewed. Constitutional:       General: She is not in acute distress. Appearance: Normal appearance. She is well-developed. She is not ill-appearing. HENT:      Head: Normocephalic and atraumatic. Right Ear: Tympanic membrane, ear canal and external ear normal. There is no impacted cerumen. Left Ear: Tympanic membrane, ear canal and external ear normal. There is no impacted cerumen.       Mouth/Throat:      Mouth: Mucous membranes are moist. No injury, lacerations or oral lesions. Pharynx: Oropharynx is clear. Comments: Tongue moist but overlying white coating to the tongue. Cardiovascular:      Rate and Rhythm: Normal rate and regular rhythm. Heart sounds: No murmur heard. Pulmonary:      Effort: Pulmonary effort is normal. No respiratory distress. Breath sounds: Normal breath sounds. No wheezing, rhonchi or rales. Musculoskeletal:      Cervical back: Neck supple. Lymphadenopathy:      Cervical: No cervical adenopathy. Skin:     General: Skin is warm and dry. Coloration: Skin is not pale. Findings: No erythema or rash. Neurological:      Mental Status: She is alert. Psychiatric:         Behavior: Behavior normal.         Thought Content: Thought content normal.         Judgment: Judgment normal.       /68 (Site: Left Upper Arm, Position: Sitting, Cuff Size: Large Adult)   Pulse 71   Temp 97.8 °F (36.6 °C) (Tympanic)   Ht 5' 7\" (1.702 m)   Wt 193 lb (87.5 kg)   SpO2 98%   BMI 30.23 kg/m²     Assessment:       Diagnosis Orders   1. Tongue pain  Vitamin K03    Basic Metabolic Panel    nystatin (MYCOSTATIN) 936410 UNIT/ML suspension   2. Left ear pain               Plan:      Return if symptoms worsen or fail to improve. Recommended check vit b12 lab and will also check bmp due to tongue irritation  Will treat with nystatin as well. Use reviewed with patient. Ears are wnl today.  Monitor  Cont with neurosurgeon for back management  Patient agreed with treatment plan    Orders Placed This Encounter   Procedures    Vitamin B12     Standing Status:   Future     Standing Expiration Date:   11/18/2022    Basic Metabolic Panel     Standing Status:   Future     Standing Expiration Date:   11/18/2022     Orders Placed This Encounter   Medications    nystatin (MYCOSTATIN) 266235 UNIT/ML suspension     Sig: Take 5 mLs by mouth 4 times daily for 7 days     Dispense:  1 each     Refill:  0 Patient given educational materials - see patient instructions. Discussed use, benefit, and side effects of prescribed medications. All patientquestions answered. Pt voiced understanding. Reviewed health maintenance. Instructedto continue current medications, diet and exercise. Patient agreed with treatmentplan. Follow up as directed.      Electronically signed by Izabela Castellanos PA-C on 11/18/2021 at 4:15 PM

## 2021-11-18 NOTE — PROGRESS NOTES
Visit Information    Have you changed or started any medications since your last visit including any over-the-counter medicines, vitamins, or herbal medicines? no   Are you having any side effects from any of your medications? -  no  Have you stopped taking any of your medications? Is so, why? -  no    Have you seen any other physician or provider since your last visit? No  Have you had any other diagnostic tests since your last visit? No  Have you been seen in the emergency room and/or had an admission to a hospital since we last saw you? No  Have you had your routine dental cleaning in the past 6 months? no    Have you activated your [employer] account? If not, what are your barriers?  Yes     Patient Care Team:  Jaya Howard PA-C as PCP - General (Family Medicine)  Jaya Howard PA-C as PCP - [facility]  Inder Hernandez MD as Consulting Physician (Urology)    Medical History Review  Past Medical, Family, and Social History reviewed and  contribute to the patient presenting condition    Health Maintenance   Topic Date Due    Hepatitis C screen  Never done    Varicella vaccine (1 of 2 - 2-dose childhood series) Never done    Pneumococcal 0-64 years Vaccine (1 of 2 - PPSV23) Never done    Diabetes screen  03/22/2021    Flu vaccine (1) Never done    COVID-19 Vaccine (2 - Booster for Pancho series) 09/12/2021    Lipid screen  01/29/2024    Cervical cancer screen  10/13/2026    DTaP/Tdap/Td vaccine (2 - Td or Tdap) 08/28/2028    HIV screen  Completed    Hepatitis A vaccine  Aged Out    Hepatitis B vaccine  Aged Out    Hib vaccine  Aged Out    Meningococcal (ACWY) vaccine  Aged Out

## 2021-11-23 ENCOUNTER — HOSPITAL ENCOUNTER (OUTPATIENT)
Age: 41
Setting detail: SPECIMEN
Discharge: HOME | End: 2021-11-23

## 2021-11-23 DIAGNOSIS — K14.6 TONGUE PAIN: ICD-10-CM

## 2021-11-23 LAB
ANION GAP SERPL CALCULATED.3IONS-SCNC: 11 MMOL/L (ref 9–17)
BUN BLDV-MCNC: 10 MG/DL (ref 6–20)
BUN/CREAT BLD: NORMAL (ref 9–20)
CALCIUM SERPL-MCNC: 9.5 MG/DL (ref 8.6–10.4)
CHLORIDE BLD-SCNC: 103 MMOL/L (ref 98–107)
CO2: 27 MMOL/L (ref 20–31)
CREAT SERPL-MCNC: 0.67 MG/DL (ref 0.5–0.9)
GFR AFRICAN AMERICAN: >60 ML/MIN
GFR NON-AFRICAN AMERICAN: >60 ML/MIN
GFR SERPL CREATININE-BSD FRML MDRD: NORMAL ML/MIN/{1.73_M2}
GFR SERPL CREATININE-BSD FRML MDRD: NORMAL ML/MIN/{1.73_M2}
GLUCOSE BLD-MCNC: 92 MG/DL (ref 70–99)
POTASSIUM SERPL-SCNC: 5 MMOL/L (ref 3.7–5.3)
SODIUM BLD-SCNC: 141 MMOL/L (ref 135–144)
VITAMIN B-12: 923 PG/ML (ref 232–1245)

## 2021-11-24 ENCOUNTER — TELEPHONE (OUTPATIENT)
Dept: FAMILY MEDICINE CLINIC | Age: 41
End: 2021-11-24

## 2021-11-24 DIAGNOSIS — K14.6 TONGUE PAIN: Primary | ICD-10-CM

## 2021-11-24 NOTE — TELEPHONE ENCOUNTER
Ok to stop the nystatin then. It is likely not thrush if not improving. See referral I placed to  [facility] ENT, have patient call for appointment, lets get his opinion.    She can try Biotene mouth wash OTC if has not yet, may help a little  B12 labs were ok

## 2021-12-08 ENCOUNTER — HOSPITAL ENCOUNTER (OUTPATIENT)
Dept: MAMMOGRAPHY | Age: 41
Discharge: HOME | End: 2021-12-10
Payer: COMMERCIAL

## 2021-12-08 DIAGNOSIS — Z12.31 ENCOUNTER FOR SCREENING MAMMOGRAM FOR BREAST CANCER: ICD-10-CM

## 2021-12-08 PROCEDURE — 77063 BREAST TOMOSYNTHESIS BI: CPT

## 2022-01-03 RX ORDER — FLUTICASONE PROPIONATE 50 MCG
SPRAY, SUSPENSION (ML) NASAL
Qty: 16 G | Refills: 0 | Status: SHIPPED | OUTPATIENT
Start: 2022-01-03 | End: 2022-05-30

## 2022-01-03 RX ORDER — IBUPROFEN 800 MG/1
TABLET ORAL
Qty: 30 TABLET | Refills: 0 | Status: SHIPPED | OUTPATIENT
Start: 2022-01-03 | End: 2022-05-30

## 2022-01-13 ENCOUNTER — OFFICE VISIT (OUTPATIENT)
Dept: FAMILY MEDICINE CLINIC | Age: 42
End: 2022-01-13
Payer: COMMERCIAL

## 2022-01-13 VITALS
OXYGEN SATURATION: 97 % | SYSTOLIC BLOOD PRESSURE: 110 MMHG | HEART RATE: 67 BPM | TEMPERATURE: 97.3 F | BODY MASS INDEX: 29.98 KG/M2 | DIASTOLIC BLOOD PRESSURE: 64 MMHG | HEIGHT: 67 IN | WEIGHT: 191 LBS

## 2022-01-13 DIAGNOSIS — K08.89 DENTALGIA: Primary | ICD-10-CM

## 2022-01-13 DIAGNOSIS — M54.50 CHRONIC MIDLINE LOW BACK PAIN WITHOUT SCIATICA: ICD-10-CM

## 2022-01-13 DIAGNOSIS — M79.644 PAIN OF FINGER OF RIGHT HAND: ICD-10-CM

## 2022-01-13 DIAGNOSIS — G89.29 CHRONIC MIDLINE LOW BACK PAIN WITHOUT SCIATICA: ICD-10-CM

## 2022-01-13 DIAGNOSIS — Z13.220 SCREENING, LIPID: ICD-10-CM

## 2022-01-13 PROCEDURE — 4004F PT TOBACCO SCREEN RCVD TLK: CPT | Performed by: PHYSICIAN ASSISTANT

## 2022-01-13 PROCEDURE — G8484 FLU IMMUNIZE NO ADMIN: HCPCS | Performed by: PHYSICIAN ASSISTANT

## 2022-01-13 PROCEDURE — 99214 OFFICE O/P EST MOD 30 MIN: CPT | Performed by: PHYSICIAN ASSISTANT

## 2022-01-13 PROCEDURE — G8417 CALC BMI ABV UP PARAM F/U: HCPCS | Performed by: PHYSICIAN ASSISTANT

## 2022-01-13 PROCEDURE — G8427 DOCREV CUR MEDS BY ELIG CLIN: HCPCS | Performed by: PHYSICIAN ASSISTANT

## 2022-01-13 RX ORDER — AMOXICILLIN 500 MG/1
500 CAPSULE ORAL 3 TIMES DAILY
Qty: 30 CAPSULE | Refills: 0 | Status: SHIPPED | OUTPATIENT
Start: 2022-01-13 | End: 2022-01-23

## 2022-01-13 ASSESSMENT — PATIENT HEALTH QUESTIONNAIRE - PHQ9
SUM OF ALL RESPONSES TO PHQ QUESTIONS 1-9: 0
SUM OF ALL RESPONSES TO PHQ9 QUESTIONS 1 & 2: 0
1. LITTLE INTEREST OR PLEASURE IN DOING THINGS: 0
SUM OF ALL RESPONSES TO PHQ QUESTIONS 1-9: 0
SUM OF ALL RESPONSES TO PHQ QUESTIONS 1-9: 0
2. FEELING DOWN, DEPRESSED OR HOPELESS: 0
SUM OF ALL RESPONSES TO PHQ QUESTIONS 1-9: 0

## 2022-01-13 ASSESSMENT — ENCOUNTER SYMPTOMS
COLOR CHANGE: 0
BACK PAIN: 1
COUGH: 0
WHEEZING: 0
SORE THROAT: 1
SHORTNESS OF BREATH: 0

## 2022-01-13 NOTE — PROGRESS NOTES
Visit Information    Have you changed or started any medications since your last visit including any over-the-counter medicines, vitamins, or herbal medicines? no   Are you having any side effects from any of your medications? -  no  Have you stopped taking any of your medications? Is so, why? -  no    Have you seen any other physician or provider since your last visit? No  Have you had any other diagnostic tests since your last visit? No  Have you been seen in the emergency room and/or had an admission to a hospital since we last saw you? No  Have you had your routine dental cleaning in the past 6 months? no    Have you activated your [employer] account? If not, what are your barriers?  Yes     Patient Care Team:  Robbie Covarrubias PA-C as PCP - General (Family Medicine)  Robbie Covarrubias PA-C as PCP - [facility] Provider  Ella Nam MD as Consulting Physician (Urology)    Medical History Review  Past Medical, Family, and Social History reviewed and  contribute to the patient presenting condition    Health Maintenance   Topic Date Due    Hepatitis C screen  Never done    Varicella vaccine (1 of 2 - 2-dose childhood series) Never done    Pneumococcal 0-64 years Vaccine (1 of 2 - PPSV23) Never done    Depression Screen  Never done    Flu vaccine (1) Never done    COVID-19 Vaccine (2 - Booster for Pancho series) 09/12/2021    Lipid screen  01/29/2024    Cervical cancer screen  10/13/2026    DTaP/Tdap/Td vaccine (2 - Td or Tdap) 08/28/2028    HIV screen  Completed    Hepatitis A vaccine  Aged Out    Hepatitis B vaccine  Aged Out    Hib vaccine  Aged Out    Meningococcal (ACWY) vaccine  Aged Out

## 2022-01-13 NOTE — PROGRESS NOTES
Medical History:   Diagnosis Date    ADD (attention deficit disorder)     Anxiety     Back pain     Carpal tunnel syndrome, bilateral 05/19/2016    Chronic back pain     started pain mgmnt 2/218    Depression     Foot pain     left (d/t back)    History of cervical cancer age 17    History of colon polyps     x 1 precancerous polyp    History of gastroesophageal reflux (GERD)     \"mild\"  no medications    History of miscarriage     x 2    Hx of blood clots     right leg superficial blood clot    Hyperlipidemia     watching diet    Lumbago     Neuroendocrine tumor     OAB (overactive bladder) 7/12/2017    Obesity (BMI 30-39. 9)     PUD (peptic ulcer disease)     Sciatica     Urge incontinence       Past Surgical History:   Procedure Laterality Date    BACK SURGERY  09/19/2016    Lumbar interbody fusion posterior, L5-S1, had 5 surgeries total    COLONOSCOPY  05/18/2020    DILATION AND CURETTAGE OF UTERUS      EYE SURGERY      R    LEEP  age 13    OTHER SURGICAL HISTORY  03/26/2018    myelogram    [place] LAMINECTOMY,>2 SGMT,LUMBAR N/A 04/23/2018    LUMBAR MICRO DISKECTOMY   L45 performed by Reilly Loyd MD at [address] [place] [address] N/A 06/25/2018    LUMBAR INTERBODY FUSION POSTERIOR L45 performed by [facility]illy Loyd MD at [facility]      UPPER GASTROINTESTINAL ENDOSCOPY  05/18/2020    UPPER GASTROINTESTINAL ENDOSCOPY  05/28/2020    polypectomy    UPPER GASTROINTESTINAL ENDOSCOPY N/A 05/28/2020    EGD BIOPSY **CASE IN OR. WITH G.I. STAFF**  EGD ESOPHAGOGASTRODUODENOSCOPY performed by Emma Aguilar MD at [address] [place] [address] ENDOSCOPY  05/28/2020    EGD CONTROL HEMORRHAGE.  APC performed by [facility] MD at [facility] Endoscopy    WISDOM TOOTH EXTRACTION         Family History   Problem Relation Age of Onset    High Cholesterol Mother     Other Mother         premature menopause  Hypertension Father     Diabetes Neg Hx     Cancer Neg Hx        Social History     Tobacco Use    Smoking status: Current Every Day Smoker     Packs/day: 0.50     Years: 15.00     Pack years: 7.50     Types: Cigarettes     Last attempt to quit: 2016     Years since quittin.5    Smokeless tobacco: Never Used   Substance Use Topics    Alcohol use: Yes     Types: 1 Glasses of wine per week     Comment: Maybe a couple drinks a month      Current Outpatient Medications   Medication Sig Dispense Refill    amoxicillin (AMOXIL) 500 MG capsule Take 1 capsule by mouth 3 times daily for 10 days 30 capsule 0    MYRBETRIQ 50 MG TB24 TAKE 1 TABLET DAILY 90 tablet 0    ibuprofen (ADVIL;MOTRIN) 800 MG tablet TAKE 1 TABLET BY MOUTH EVERY SIX HOURS WITH FOOD or milk AS NEEDED FOR PAIN 30 tablet 0    fluticasone (FLONASE) 50 MCG/ACT nasal spray INHALE 2 SPRAYS NASALLY TWO TIMES A DAY 16 g 0    progesterone (PROMETRIUM) 100 MG CAPS capsule TAKE 1 CAPSULE BY MOUTH EVERY DAY AT NIGHT 30 capsule 6    venlafaxine (EFFEXOR XR) 37.5 MG extended release capsule TAKE 1 CAPSULE BY MOUTH EVERY DAY 30 capsule 6    Calcium Carb-Cholecalciferol (CALCIUM 500 + D3 PO) Take by mouth      DENTA 5000 PLUS 1.1 % CREA BRUSH WITH THIN RIBBON FOR 2 MINUTES AT BEDTIME. DO NOT EAT OR DRINK FOR 30 MINUTES AFTER.  solifenacin (VESICARE) 10 MG tablet TAKE 1 TABLET DAILY 90 tablet 1    naltrexone-buPROPion (CONTRAVE) 8-90 MG per extended release tablet Take 2 tablets po bid 120 tablet 3    BIOTIN PO Take by mouth      MAGNESIUM PO Take by mouth      carisoprodol (SOMA) 250 MG tablet Take 50 mg by mouth 4 times daily.       vitamin B-12 (CYANOCOBALAMIN) 1000 MCG tablet Take 1,000 mcg by mouth daily      Multiple Vitamins-Minerals (THERAPEUTIC MULTIVITAMIN-MINERALS) tablet Take 1 tablet by mouth daily      Lactobacillus (PROBIOTIC ACIDOPHILUS PO) Take 1 tablet by mouth daily      amphetamine-dextroamphetamine (ADDERALL) 20 MG tablet Take 1 tablet by mouth 2 times daily for 30 days. 60 tablet 0     No current facility-administered medications for this visit. No Known Allergies    Health Maintenance   Topic Date Due    Hepatitis C screen  Never done    Varicella vaccine (1 of 2 - 2-dose childhood series) Never done    Pneumococcal 0-64 years Vaccine (1 of 2 - PPSV23) Never done    Depression Screen  Never done    Flu vaccine (1) Never done    COVID-19 Vaccine (2 - Booster for Pancho series) 09/12/2021    Lipid screen  01/29/2024    Cervical cancer screen  10/13/2026    DTaP/Tdap/Td vaccine (2 - Td or Tdap) 08/28/2028    HIV screen  Completed    Hepatitis A vaccine  Aged Out    Hepatitis B vaccine  Aged Out    Hib vaccine  Aged Out    Meningococcal (ACWY) vaccine  Aged Out       Subjective:     Review of Systems   Constitutional: Negative for activity change, appetite change, fatigue and fever. /64 (Site: Right Upper Arm, Position: Sitting, Cuff Size: Large Adult)   Pulse 67   Temp 97.3 °F (36.3 °C) (Tympanic)   Ht 5' 7\" (1.702 m)   Wt 191 lb (86.6 kg)   SpO2 97%   BMI 29.91 kg/m²    HENT: Positive for dental problem and sore throat. Respiratory: Negative for cough, shortness of breath and wheezing. Cardiovascular: Negative for chest pain and palpitations. Musculoskeletal: Positive for arthralgias (right 3rd finger) and back pain (chronic). Skin: Negative for color change, pallor, rash and wound. Neurological: Negative for weakness. Hematological: Negative for adenopathy. Psychiatric/Behavioral: Negative for sleep disturbance. The patient is not nervous/anxious. Objective:     Physical Exam  Vitals and nursing note reviewed. Constitutional:       General: She is not in acute distress. Appearance: Normal appearance. She is well-developed. She is not ill-appearing. HENT:      Head: Normocephalic and atraumatic.       Mouth/Throat:      Mouth: Mucous membranes are moist. Dentition: Normal dentition. No dental caries. Pharynx: No pharyngeal swelling, oropharyngeal exudate or posterior oropharyngeal erythema. Tonsils: No tonsillar exudate. Cardiovascular:      Rate and Rhythm: Normal rate and regular rhythm. Heart sounds: No murmur heard. Pulmonary:      Effort: Pulmonary effort is normal. No respiratory distress. Breath sounds: Normal breath sounds. No wheezing, rhonchi or rales. Musculoskeletal:      Right hand: Tenderness present. No swelling, deformity, lacerations or bony tenderness. Normal range of motion. Normal strength. Normal sensation. Comments: Tenderness to the third metacarpal phalangeal joint with palpation. Range of motion is normal and does not cause pain. No visual abnormalities   Skin:     General: Skin is warm and dry. Coloration: Skin is not pale. Findings: No erythema or rash. Neurological:      Mental Status: She is alert and oriented to person, place, and time. Psychiatric:         Mood and Affect: Mood normal.         Behavior: Behavior normal.         Thought Content: Thought content normal.         Judgment: Judgment normal.       /64 (Site: Right Upper Arm, Position: Sitting, Cuff Size: Large Adult)   Pulse 67   Temp 97.3 °F (36.3 °C) (Tympanic)   Ht 5' 7\" (1.702 m)   Wt 191 lb (86.6 kg)   SpO2 97%   BMI 29.91 kg/m²     Assessment:       Diagnosis Orders   1. Dentalgia  amoxicillin (AMOXIL) 500 MG capsule   2. Screening, lipid  Comprehensive Metabolic Panel    Lipid Panel    CBC Auto Differential   3. Pain of finger of right hand     4. Chronic midline low back pain without sciatica               Plan:      Return if symptoms worsen or fail to improve. We will have her start an antibiotic based on recent dental surgery and now having irritation and sore throat. Follow-up with the dentist if this continues to persist.  Update screening labs, order given.   Encourage patient to do this fasting  Suspect this may be the start of some arthritis in the finger. Patient interested in continuing use of Voltaren topical gel. She is just been using this once a day, I encouraged her to go up to 3-4 times daily as needed. We discussed x-ray if this continues to worsen. We will update her FMLA for her chronic back pain. She is doing better with her pain stimulator. She has an appointment next month to meet with a neurologist for his opinion on her chronic pain. This was a referral from her neurosurgeon  Follow-up if any further concerns  Patient agreed with treatment plan      Orders Placed This Encounter   Procedures    Comprehensive Metabolic Panel     Standing Status:   Future     Standing Expiration Date:   1/13/2023    Lipid Panel     Standing Status:   Future     Standing Expiration Date:   1/13/2023     Order Specific Question:   Is Patient Fasting?/# of Hours     Answer:   yes    CBC Auto Differential     Standing Status:   Future     Standing Expiration Date:   1/13/2023     Orders Placed This Encounter   Medications    amoxicillin (AMOXIL) 500 MG capsule     Sig: Take 1 capsule by mouth 3 times daily for 10 days     Dispense:  30 capsule     Refill:  0       Patient given educational materials - see patient instructions. Discussed use, benefit, and side effects of prescribed medications. All patientquestions answered. Pt voiced understanding. Reviewed health maintenance. Instructedto continue current medications, diet and exercise. Patient agreed with treatmentplan. Follow up as directed.      Electronically signed by Yesy Strickland PA-C on 1/13/2022 at 2:53 PM

## 2022-01-19 ENCOUNTER — PROCEDURE VISIT (OUTPATIENT)
Dept: PODIATRY | Age: 42
End: 2022-01-19
Payer: COMMERCIAL

## 2022-01-19 VITALS — RESPIRATION RATE: 16 BRPM | BODY MASS INDEX: 29.98 KG/M2 | WEIGHT: 191 LBS | HEIGHT: 67 IN

## 2022-01-19 DIAGNOSIS — M79.604 PAIN OF RIGHT LOWER EXTREMITY: ICD-10-CM

## 2022-01-19 DIAGNOSIS — L60.0 INGROWN NAIL OF GREAT TOE OF RIGHT FOOT: Primary | ICD-10-CM

## 2022-01-19 PROCEDURE — G8427 DOCREV CUR MEDS BY ELIG CLIN: HCPCS | Performed by: PODIATRIST

## 2022-01-19 PROCEDURE — G8417 CALC BMI ABV UP PARAM F/U: HCPCS | Performed by: PODIATRIST

## 2022-01-19 PROCEDURE — 11750 EXCISION NAIL&NAIL MATRIX: CPT | Performed by: PODIATRIST

## 2022-01-19 PROCEDURE — G8484 FLU IMMUNIZE NO ADMIN: HCPCS | Performed by: PODIATRIST

## 2022-01-19 PROCEDURE — 4004F PT TOBACCO SCREEN RCVD TLK: CPT | Performed by: PODIATRIST

## 2022-01-19 PROCEDURE — 99214 OFFICE O/P EST MOD 30 MIN: CPT | Performed by: PODIATRIST

## 2022-01-19 NOTE — PROGRESS NOTES
[address] [facility] PODIATRY [facility]  [address] [place] [address]  Dept: [phone]  Dept Fax: [phone]    RETURN PATIENT PROGRESS NOTE  Date of patient's visit: 1/19/2022  Patient's Name:  Florentin Lara YOB: 1980            Patient Care Team:  Belem Centeno PA-C as PCP - General (Family Medicine)  Belem Centeno PA-C as PCP - [facility] Empaneled Provider  Julianna Melendez MD as Consulting Physician (Urology)       Florentin Lara 37 y.o. female that presents for follow-up of   Chief Complaint   Patient presents with    Ingrown Toenail     Pt's primary care physician is Heather Nguyen PA-C last seen 01/13/2022  Symptoms began 1 month(s) ago and are unchanged . Patient relates pain is present to right great toenail. Pain is rated 0 out of 10 and is described as intermittent, mild. Treatments prior to today's visit include: none. Currently denies F/C/N/V. No Known Allergies    Past Medical History:   Diagnosis Date    ADD (attention deficit disorder)     Anxiety     Back pain     Carpal tunnel syndrome, bilateral 05/19/2016    Chronic back pain     started pain mgmnt 2/218    Depression     Foot pain     left (d/t back)    History of cervical cancer age 17    History of colon polyps     x 1 precancerous polyp    History of gastroesophageal reflux (GERD)     \"mild\"  no medications    History of miscarriage     x 2    Hx of blood clots     right leg superficial blood clot    Hyperlipidemia     watching diet    Lumbago     Neuroendocrine tumor     OAB (overactive bladder) 7/12/2017    Obesity (BMI 30-39. 9)     PUD (peptic ulcer disease)     Sciatica     Urge incontinence        Prior to Admission medications    Medication Sig Start Date End Date Taking?  Authorizing Provider   amoxicillin (AMOXIL) 500 MG capsule Take 1 capsule by mouth 3 times daily for 10 days 1/13/22 1/23/22 Yes Renee Taylor PRASHANT   MYRBETRIQ 50 MG TB24 TAKE 1 TABLET DAILY 1/3/22  Yes Gi Gibbs MD   ibuprofen (ADVIL;MOTRIN) 800 MG tablet TAKE 1 TABLET BY MOUTH EVERY SIX HOURS WITH FOOD or milk AS NEEDED FOR PAIN 1/3/22  Yes Renee Valdez PA-C   fluticasone (FLONASE) 50 MCG/ACT nasal spray INHALE 2 SPRAYS NASALLY TWO TIMES A DAY 1/3/22  Yes Renee Valdez PA-C   progesterone (PROMETRIUM) 100 MG CAPS capsule TAKE 1 CAPSULE BY MOUTH EVERY DAY AT NIGHT 11/15/21  Yes Kavita Quinn APRN - CNP   venlafaxine (EFFEXOR XR) 37.5 MG extended release capsule TAKE 1 CAPSULE BY MOUTH EVERY DAY 11/15/21  Yes Kavita Watts APRN - CNP   Calcium Carb-Cholecalciferol (CALCIUM 500 + D3 PO) Take by mouth   Yes Historical Provider, MD   DENTA 5000 PLUS 1.1 % CREA BRUSH WITH THIN RIBBON FOR 2 MINUTES AT BEDTIME. DO NOT EAT OR DRINK FOR 30 MINUTES AFTER. 7/15/21  Yes Historical Provider, MD   solifenacin (VESICARE) 10 MG tablet TAKE 1 TABLET DAILY 10/4/21  Yes Gi Gibbs MD   naltrexone-buPROPion (CONTRAVE) 8-90 MG per extended release tablet Take 2 tablets po bid 9/26/21  Yes Renee Valdez PA-C   BIOTIN PO Take by mouth   Yes Historical Provider, MD   MAGNESIUM PO Take by mouth   Yes Historical Provider, MD   carisoprodol (SOMA) 250 MG tablet Take 50 mg by mouth 4 times daily. Yes Historical Provider, MD   vitamin B-12 (CYANOCOBALAMIN) 1000 MCG tablet Take 1,000 mcg by mouth daily   Yes Historical Provider, MD   Multiple Vitamins-Minerals (THERAPEUTIC MULTIVITAMIN-MINERALS) tablet Take 1 tablet by mouth daily   Yes Historical Provider, MD   Lactobacillus (PROBIOTIC ACIDOPHILUS PO) Take 1 tablet by mouth daily   Yes Historical Provider, MD   amphetamine-dextroamphetamine (ADDERALL) 20 MG tablet Take 1 tablet by mouth 2 times daily for 30 days.  10/24/19 11/18/21  Charlie Sarabia PA-C       Review of Systems    Review of Systems:  History obtained from chart review and the patient  General ROS: negative for - chills, fatigue, fever, night sweats or weight gain  Constitutional: Negative for chills, diaphoresis, fatigue, fever and unexpected weight change. Musculoskeletal: Positive for arthralgias, gait problem and joint swelling. Neurological ROS: negative for - behavioral changes, confusion, headaches or seizures. Negative for weakness and numbness. Dermatological ROS: negative for - mole changes, rash  Cardiovascular: Negative for leg swelling. Gastrointestinal: Negative for constipation, diarrhea, nausea and vomiting. Lower Extremity Physical Examination:     Vitals:   Vitals:    01/19/22 1300   Resp: 16     General: AAO x 3 in NAD. Dermatologic Exam:  Right lateral hallux nail is incurvated with increased edema    Musculoskeletal:     1st MPJ ROM decreased, Bilateral.  Muscle strength 5/5, Bilateral.  Pain present upon palpation of right hallux nail. Medial longitudinal arch, Bilateral WNL. Ankle ROM WNL,Bilateral.    Dorsally contracted digits absent digits 1-5 Bilateral.     Vascular: DP and PT pulses palpable 2/4, Bilateral.  CFT <3 seconds, Bilateral.  Hair growth present to the level of the digits, Bilateral.  Edema absent, Bilateral.  Varicosities absent, Bilateral. Erythema absent, Bilateral    Neurological: Sensation intact to light touch to level of digits, Bilateral.  Protective sensation intact 10/10 sites via 5.07/10g [place] Monofilament, Bilateral.  negative Tinel's, Bilateral.  negative Valleix sign, Bilateral.      Integument: Warm, dry, supple, Bilateral.  Open lesion absent, Bilateral.  Interdigital maceration absent to web spaces 1-4, Bilateral.  Fissures absent, Bilateral.       Asessment: Patient is a 37 y.o. female with:    Diagnosis Orders   1. Ingrown nail of great toe of right foot  [place] REMOVAL OF NAIL BED   2. Pain of right lower extremity  KY REMOVAL OF NAIL BED     Plan: Patient examined and evaluated. Current condition and treatment options discussed in detail.       Verbal

## 2022-01-26 ENCOUNTER — OFFICE VISIT (OUTPATIENT)
Dept: PODIATRY | Age: 42
End: 2022-01-26

## 2022-01-26 VITALS — RESPIRATION RATE: 16 BRPM | HEIGHT: 67 IN | WEIGHT: 191 LBS | BODY MASS INDEX: 29.98 KG/M2

## 2022-01-26 DIAGNOSIS — Z98.890 POST-OPERATIVE STATE: Primary | ICD-10-CM

## 2022-01-26 PROCEDURE — 99024 POSTOP FOLLOW-UP VISIT: CPT | Performed by: PODIATRIST

## 2022-01-26 NOTE — PROGRESS NOTES
[address] [facility] PODIATRY [facility]  [address] [place] [address]  Dept: [phone]  Dept Fax: [phone]    POST-OP PROGRESS NOTE  Date of patient's visit: 1/26/2022  Patient's Name:  Chrsi Marquez YOB: 1980            Patient Care Team:  Francisco Cuenca PA-C as PCP - General (Family Medicine)  Francisco Cuenca PA-C as PCP - [facility]  Zaheer Meza MD as Consulting Physician (Urology)        Chief Complaint   Patient presents with    Post-Op Check    Ingrown Toenail       Pt's primary care physician is Lary Morgan PA-C last seen 01/13/2022     Subjective: Chris Marquez is a 37 y.o. female who presents to the office today 1week(s)  S/P right hallux nail avulsion for correction of ingrown nail  Problem List Items Addressed This Visit     None      Visit Diagnoses     Post-operative state    -  Primary      . Patient relates pain is present and improved. Pain is rated 1 out of 10 and is described as mild. Currently denies F/C/N/V. Physical Examination:   Minimal bleeding post operatively. Edema present. No erythema. No Pus. Operative correction is satisfactory. Assessment: Chris Marquez is status post as above  Normal post operative course. Doing well          ICD-10-CM    1. Post-operative state  Z98.890          Plan:  Patient examined and evaluated. Current condition and treatment options discussed in detail. Advised pt to soak once daily for one week. Apply triple abx and DSD. Monitor daily for infection. Verbal and written instructions given to patient. Orders: No orders of the defined types were placed in this encounter. Contact office with any questions/problems/concerns. RTC in 2month(s).      Electronically signed by Johanna Gomez DPM on 1/26/2022 at 1:55 PM  1/26/2022

## 2022-02-03 DIAGNOSIS — E66.3 OVERWEIGHT (BMI 25.0-29.9): ICD-10-CM

## 2022-02-03 RX ORDER — NALTREXONE HYDROCHLORIDE AND BUPROPION HYDROCHLORIDE 8; 90 MG/1; MG/1
TABLET, EXTENDED RELEASE ORAL
Qty: 120 TABLET | Refills: 0 | Status: SHIPPED | OUTPATIENT
Start: 2022-02-03 | End: 2022-03-06

## 2022-02-14 ENCOUNTER — HOSPITAL ENCOUNTER (OUTPATIENT)
Age: 42
Setting detail: SPECIMEN
Discharge: HOME | End: 2022-02-14

## 2022-02-14 DIAGNOSIS — Z13.220 SCREENING, LIPID: ICD-10-CM

## 2022-02-14 LAB
ABSOLUTE EOS #: 0.15 K/UL (ref 0–0.44)
ABSOLUTE IMMATURE GRANULOCYTE: <0.03 K/UL (ref 0–0.3)
ABSOLUTE LYMPH #: 2.38 K/UL (ref 1.1–3.7)
ABSOLUTE MONO #: 0.51 K/UL (ref 0.1–1.2)
ALBUMIN SERPL-MCNC: 4.7 G/DL (ref 3.5–5.2)
ALBUMIN/GLOBULIN RATIO: 2 (ref 1–2.5)
ALP BLD-CCNC: 75 U/L (ref 35–104)
ALT SERPL-CCNC: 18 U/L (ref 5–33)
ANION GAP SERPL CALCULATED.3IONS-SCNC: 23 MMOL/L (ref 9–17)
AST SERPL-CCNC: 20 U/L
BASOPHILS # BLD: 1 % (ref 0–2)
BASOPHILS ABSOLUTE: 0.03 K/UL (ref 0–0.2)
BILIRUB SERPL-MCNC: 0.31 MG/DL (ref 0.3–1.2)
BUN BLDV-MCNC: 14 MG/DL (ref 6–20)
BUN/CREAT BLD: ABNORMAL (ref 9–20)
CALCIUM SERPL-MCNC: 9.9 MG/DL (ref 8.6–10.4)
CHLORIDE BLD-SCNC: 103 MMOL/L (ref 98–107)
CHOLESTEROL/HDL RATIO: 3.7
CHOLESTEROL: 201 MG/DL
CO2: 22 MMOL/L (ref 20–31)
CREAT SERPL-MCNC: 0.9 MG/DL (ref 0.5–0.9)
DIFFERENTIAL TYPE: NORMAL
EOSINOPHILS RELATIVE PERCENT: 2 % (ref 1–4)
GFR AFRICAN AMERICAN: >60 ML/MIN
GFR NON-AFRICAN AMERICAN: >60 ML/MIN
GFR SERPL CREATININE-BSD FRML MDRD: ABNORMAL ML/MIN/{1.73_M2}
GFR SERPL CREATININE-BSD FRML MDRD: ABNORMAL ML/MIN/{1.73_M2}
GLUCOSE BLD-MCNC: 96 MG/DL (ref 70–99)
HCT VFR BLD CALC: 44.1 % (ref 36.3–47.1)
HDLC SERPL-MCNC: 54 MG/DL
HEMOGLOBIN: 14.4 G/DL (ref 11.9–15.1)
IMMATURE GRANULOCYTES: 0 %
LDL CHOLESTEROL: 126 MG/DL (ref 0–130)
LYMPHOCYTES # BLD: 37 % (ref 24–43)
MCH RBC QN AUTO: 30.4 PG (ref 25.2–33.5)
MCHC RBC AUTO-ENTMCNC: 32.7 G/DL (ref 28.4–34.8)
MCV RBC AUTO: 93.2 FL (ref 82.6–102.9)
MONOCYTES # BLD: 8 % (ref 3–12)
NRBC AUTOMATED: 0 PER 100 WBC
PDW BLD-RTO: 11.9 % (ref 11.8–14.4)
PLATELET # BLD: 283 K/UL (ref 138–453)
PLATELET ESTIMATE: NORMAL
PMV BLD AUTO: 10.8 FL (ref 8.1–13.5)
POTASSIUM SERPL-SCNC: 4.8 MMOL/L (ref 3.7–5.3)
RBC # BLD: 4.73 M/UL (ref 3.95–5.11)
RBC # BLD: NORMAL 10*6/UL
SEG NEUTROPHILS: 52 % (ref 36–65)
SEGMENTED NEUTROPHILS ABSOLUTE COUNT: 3.31 K/UL (ref 1.5–8.1)
SODIUM BLD-SCNC: 148 MMOL/L (ref 135–144)
TOTAL PROTEIN: 7.1 G/DL (ref 6.4–8.3)
TRIGL SERPL-MCNC: 103 MG/DL
VLDLC SERPL CALC-MCNC: ABNORMAL MG/DL (ref 1–30)
WBC # BLD: 6.4 K/UL (ref 3.5–11.3)
WBC # BLD: NORMAL 10*3/UL

## 2022-03-05 DIAGNOSIS — E66.3 OVERWEIGHT (BMI 25.0-29.9): ICD-10-CM

## 2022-03-06 RX ORDER — NALTREXONE HYDROCHLORIDE AND BUPROPION HYDROCHLORIDE 8; 90 MG/1; MG/1
TABLET, EXTENDED RELEASE ORAL
Qty: 120 TABLET | Refills: 0 | Status: SHIPPED | OUTPATIENT
Start: 2022-03-06 | End: 2022-04-04

## 2022-03-06 NOTE — TELEPHONE ENCOUNTER
Gwen Mcdermott is calling to request a refill on the following medication(s):    Medication Request:  Requested Prescriptions     Pending Prescriptions Disp Refills    CONTRAVE 8-90 MG per extended release tablet [Pharmacy Med Name: Contrave Oral Tablet Extended Release 12 Hour 8-90 MG] 120 tablet 0     Sig: TAKE 2 TABLETS BY MOUTH TWO TIMES A DAY       Last Visit Date (If Applicable):  9/97/2971    Next Visit Date:    Visit date not found

## 2022-04-02 DIAGNOSIS — E66.3 OVERWEIGHT (BMI 25.0-29.9): ICD-10-CM

## 2022-04-04 RX ORDER — NALTREXONE HYDROCHLORIDE AND BUPROPION HYDROCHLORIDE 8; 90 MG/1; MG/1
TABLET, EXTENDED RELEASE ORAL
Qty: 120 TABLET | Refills: 0 | Status: SHIPPED | OUTPATIENT
Start: 2022-04-04 | End: 2022-05-06 | Stop reason: SDUPTHER

## 2022-04-25 ENCOUNTER — HOSPITAL ENCOUNTER (OUTPATIENT)
Age: 42
Setting detail: SPECIMEN
Discharge: HOME | End: 2022-04-25

## 2022-04-25 DIAGNOSIS — N30.00 ACUTE CYSTITIS WITHOUT HEMATURIA: ICD-10-CM

## 2022-04-27 LAB
CULTURE: ABNORMAL
CULTURE: ABNORMAL
SPECIMEN DESCRIPTION: ABNORMAL

## 2022-05-06 DIAGNOSIS — E66.3 OVERWEIGHT (BMI 25.0-29.9): ICD-10-CM

## 2022-05-06 RX ORDER — NALTREXONE HYDROCHLORIDE AND BUPROPION HYDROCHLORIDE 8; 90 MG/1; MG/1
TABLET, EXTENDED RELEASE ORAL
Qty: 120 TABLET | Refills: 0 | Status: SHIPPED | OUTPATIENT
Start: 2022-05-06

## 2022-05-10 ENCOUNTER — HOSPITAL ENCOUNTER (OUTPATIENT)
Age: 42
Setting detail: SPECIMEN
Discharge: HOME | End: 2022-05-10

## 2022-05-11 LAB
CULTURE: NORMAL
SPECIMEN DESCRIPTION: NORMAL

## 2022-05-30 RX ORDER — FLUTICASONE PROPIONATE 50 MCG
SPRAY, SUSPENSION (ML) NASAL
Qty: 16 G | Refills: 0 | Status: SHIPPED | OUTPATIENT
Start: 2022-05-30 | End: 2022-09-28

## 2022-05-30 RX ORDER — IBUPROFEN 800 MG/1
TABLET ORAL
Qty: 30 TABLET | Refills: 0 | Status: SHIPPED | OUTPATIENT
Start: 2022-05-30

## 2022-05-31 RX ORDER — PROGESTERONE 100 MG/1
CAPSULE ORAL
Qty: 30 CAPSULE | Refills: 5 | Status: SHIPPED | OUTPATIENT
Start: 2022-05-31

## 2022-06-01 ENCOUNTER — HOSPITAL ENCOUNTER (OUTPATIENT)
Age: 42
Setting detail: SPECIMEN
Discharge: HOME | End: 2022-06-01

## 2022-06-01 DIAGNOSIS — N30.00 ACUTE CYSTITIS WITHOUT HEMATURIA: ICD-10-CM

## 2022-06-04 LAB
CULTURE: ABNORMAL
SPECIMEN DESCRIPTION: ABNORMAL

## 2022-06-16 ENCOUNTER — HOSPITAL ENCOUNTER (OUTPATIENT)
Age: 42
Setting detail: SPECIMEN
Discharge: HOME | End: 2022-06-16

## 2022-06-16 DIAGNOSIS — N39.0 URINARY TRACT INFECTION WITH HEMATURIA, SITE UNSPECIFIED: ICD-10-CM

## 2022-06-16 DIAGNOSIS — R31.9 URINARY TRACT INFECTION WITH HEMATURIA, SITE UNSPECIFIED: ICD-10-CM

## 2022-06-17 LAB
CULTURE: NORMAL
SPECIMEN DESCRIPTION: NORMAL

## 2022-06-28 RX ORDER — VENLAFAXINE HYDROCHLORIDE 37.5 MG/1
CAPSULE, EXTENDED RELEASE ORAL
Qty: 30 CAPSULE | Refills: 4 | Status: SHIPPED | OUTPATIENT
Start: 2022-06-28

## 2022-07-19 ENCOUNTER — OFFICE VISIT (OUTPATIENT)
Dept: FAMILY MEDICINE CLINIC | Age: 42
End: 2022-07-19
Payer: COMMERCIAL

## 2022-07-19 VITALS
HEIGHT: 67 IN | DIASTOLIC BLOOD PRESSURE: 64 MMHG | HEART RATE: 63 BPM | OXYGEN SATURATION: 98 % | BODY MASS INDEX: 29.19 KG/M2 | SYSTOLIC BLOOD PRESSURE: 114 MMHG | WEIGHT: 186 LBS | TEMPERATURE: 97.3 F

## 2022-07-19 DIAGNOSIS — M54.50 CHRONIC MIDLINE LOW BACK PAIN WITHOUT SCIATICA: ICD-10-CM

## 2022-07-19 DIAGNOSIS — G89.29 CHRONIC MIDLINE LOW BACK PAIN WITHOUT SCIATICA: ICD-10-CM

## 2022-07-19 DIAGNOSIS — E66.3 OVERWEIGHT (BMI 25.0-29.9): Primary | ICD-10-CM

## 2022-07-19 PROCEDURE — G8417 CALC BMI ABV UP PARAM F/U: HCPCS | Performed by: PHYSICIAN ASSISTANT

## 2022-07-19 PROCEDURE — 99213 OFFICE O/P EST LOW 20 MIN: CPT | Performed by: PHYSICIAN ASSISTANT

## 2022-07-19 PROCEDURE — G8428 CUR MEDS NOT DOCUMENT: HCPCS | Performed by: PHYSICIAN ASSISTANT

## 2022-07-19 PROCEDURE — 4004F PT TOBACCO SCREEN RCVD TLK: CPT | Performed by: PHYSICIAN ASSISTANT

## 2022-07-19 RX ORDER — LIRAGLUTIDE 6 MG/ML
INJECTION, SOLUTION SUBCUTANEOUS
Qty: 3 PEN | Refills: 1 | Status: SHIPPED | OUTPATIENT
Start: 2022-07-19 | End: 2022-10-12

## 2022-07-19 RX ORDER — PEN NEEDLE, DIABETIC 31 GX5/16"
1 NEEDLE, DISPOSABLE MISCELLANEOUS DAILY
Qty: 100 EACH | Refills: 0 | Status: SHIPPED | OUTPATIENT
Start: 2022-07-19

## 2022-07-19 ASSESSMENT — ENCOUNTER SYMPTOMS
BACK PAIN: 1
COLOR CHANGE: 0
SHORTNESS OF BREATH: 0
COUGH: 0

## 2022-07-19 NOTE — PROGRESS NOTES
Visit Information    Have you changed or started any medications since your last visit including any over-the-counter medicines, vitamins, or herbal medicines? no   Are you having any side effects from any of your medications? -  no  Have you stopped taking any of your medications? Is so, why? -  no    Have you seen any other physician or provider since your last visit? No  Have you had any other diagnostic tests since your last visit? No  Have you been seen in the emergency room and/or had an admission to a hospital since we last saw you? No  Have you had your routine dental cleaning in the past 6 months? no    Have you activated your [employer] account? If not, what are your barriers?  Yes     Patient Care Team:  Dayan Perrin PA-C as PCP - General (Family Medicine)  Dayan Perrin PA-C as PCP - [facility] Provider  Antonette Brito MD as Consulting Physician (Urology)    Medical History Review  Past Medical, Family, and Social History reviewed and  contribute to the patient presenting condition    Health Maintenance   Topic Date Due    Varicella vaccine (1 of 2 - 2-dose childhood series) Never done    Pneumococcal 0-64 years Vaccine (1 - PCV) Never done    Hepatitis C screen  Never done    COVID-19 Vaccine (2 - Booster for Pancho series) 09/12/2021    Flu vaccine (1) 09/01/2022    Depression Screen  01/13/2023    Cervical cancer screen  10/13/2026    Lipids  02/14/2027    DTaP/Tdap/Td vaccine (2 - Td or Tdap) 08/28/2028    HIV screen  Completed    Hepatitis A vaccine  Aged Out    Hepatitis B vaccine  Aged Out    Hib vaccine  Aged Out    Meningococcal (ACWY) vaccine  Aged Out

## 2022-07-19 NOTE — PROGRESS NOTES
[address] [facility]  [address]  [address] [facility] [address]  Dept: [phone]  Dept Fax: [phone]    Destiny Mendoza is a 40 y.o. female who presents today for her medical conditions/complaintsas noted below. Destiny Mendoza is c/o of   Chief Complaint   Patient presents with    Back Pain     Chronic lower back pain- renew FMLA    Weight Gain     Looking to go back on medication      Headache     Will need a new script for tinted windshield         HPI:     Back Pain  Associated symptoms include headaches. Pertinent negatives include no chest pain, fever, numbness or weakness. Headache   Associated symptoms include back pain. Pertinent negatives include no coughing, fever, numbness or weakness. Patient here for recheck and update on FMLA for chronic back pain. She states doing well with present number of days she can take prn. Only takes off when needed. Back doing ok at this point. She has been seeing Dr Lily Montes De Oca neurologist and Dr. John Rutherford neurosurgery for chronic pain. Seeing nuerology for chronic leg cramps. Was diagnosed with RLS and started on treatment but no improvement. Started on iron and klonopin at night. This is working better for her. She is scheduled for an EMG 8/11/22. She is interested in restarting saxenda to help with weight loss. She checked with insurance and is covered better now. She would like to get back on this. Current BMI. She does walk a lot at work, drinks water well. Less active on weekends. Diet could be better. Patient also needing renewal on tinted windshield for chronic headaches.      No results found for: LABA1C          ( goal A1Cis < 7)   No results found for: LABMICR  LDL Cholesterol (mg/dL)   Date Value   02/14/2022 126   01/29/2019 100   05/18/2018 145 (H)       (goal LDL is <100)   AST (U/L)   Date Value   02/14/2022 20     ALT (U/L)   Date Value   02/14/2022 18     BUN (mg/dL)   Date Value   02/14/2022 14 BP Readings from Last 3 Encounters:   07/19/22 114/64   01/13/22 110/64   11/18/21 128/68          (goal 120/80)    Past Medical History:   Diagnosis Date    ADD (attention deficit disorder)     Anxiety     Back pain     Carpal tunnel syndrome, bilateral 05/19/2016    Chronic back pain     started pain mgmnt 2/218    Depression     Foot pain     left (d/t back)    History of cervical cancer age 17    History of colon polyps     x 1 precancerous polyp    History of gastroesophageal reflux (GERD)     \"mild\"  no medications    History of miscarriage     x 2    Hx of blood clots     right leg superficial blood clot    Hyperlipidemia     watching diet    Lumbago     Neuroendocrine tumor     OAB (overactive bladder) 7/12/2017    Obesity (BMI 30-39. 9)     PUD (peptic ulcer disease)     Sciatica     Urge incontinence       Past Surgical History:   Procedure Laterality Date    BACK SURGERY  09/19/2016    Lumbar interbody fusion posterior, L5-S1, had 5 surgeries total    COLONOSCOPY  05/18/2020    DILATION AND CURETTAGE OF UTERUS      EYE SURGERY      R    LEEP  age 17    OTHER SURGICAL HISTORY  03/26/2018    myelogram    FL LAMINECTOMY,>2 SGMT,LUMBAR N/A 04/23/2018    LUMBAR MICRO DISKECTOMY   L45 performed by Matt Aranda MD at [address] [place] [address],[address] N/A 06/25/2018    LUMBAR INTERBODY FUSION POSTERIOR L45 performed by Matt Aranda MD at [place] [address] ENDOSCOPY  05/18/2020    UPPER GASTROINTESTINAL ENDOSCOPY  05/28/2020    polypectomy    UPPER GASTROINTESTINAL ENDOSCOPY N/A 05/28/2020    EGD BIOPSY **CASE IN OR. WITH G.I. STAFF**  EGD ESOPHAGOGASTRODUODENOSCOPY performed by Matilde Preston MD at [address] [facility] ENDOSCOPY  05/28/2020    EGD CONTROL HEMORRHAGE.  APC performed by Matilde Preston MD at [facility] Endoscopy    WISDOM TOOTH EXTRACTION         Family History   Problem Relation Age of Onset    High Cholesterol Mother     Other Mother         premature menopause    Hypertension Father     Diabetes Neg Hx     Cancer Neg Hx        Social History     Tobacco Use    Smoking status: Every Day     Packs/day: 0.50     Years: 15.00     Pack years: 7.50     Types: Cigarettes     Last attempt to quit: 2016     Years since quittin.0    Smokeless tobacco: Never   Substance Use Topics    Alcohol use: Yes     Types: 1 Glasses of wine per week     Comment: Maybe a couple drinks a month      Current Outpatient Medications   Medication Sig Dispense Refill    liraglutide-weight management (SAXENDA) 18 MG/3ML SOPN Start with 0.6mg injection QD for week 1, then increase by 0.6mg once weekly thereafter, goal of 3mg injection QD 3 pen 1    Insulin Pen Needle (B-D ULTRAFINE III SHORT PEN) 31G X 8 MM MISC Inject 1 each into the skin daily May substitute with any brand 100 each 0    venlafaxine (EFFEXOR XR) 37.5 MG extended release capsule TAKE 1 CAPSULE BY MOUTH EVERY DAY 30 capsule 4    MYRBETRIQ 50 MG TB24 TAKE 1 TABLET DAILY 90 tablet 3    solifenacin (VESICARE) 10 MG tablet TAKE 1 TABLET DAILY 90 tablet 3    progesterone (PROMETRIUM) 100 MG CAPS capsule TAKE 1 CAPSULE BY MOUTH EVERY DAY AT NIGHT 30 capsule 5    sulfamethoxazole-trimethoprim (BACTRIM DS) 800-160 MG per tablet Take 1 tablet by mouth 2 times daily 14 tablet 0    fluticasone (FLONASE) 50 MCG/ACT nasal spray INHALE 2 SPRAYS NASALLY TWO TIMES A DAY 16 g 0    ibuprofen (ADVIL;MOTRIN) 800 MG tablet TAKE 1 TABLET BY MOUTH EVERY SIX HOURS WITH FOOD OR MILK AS NEEDED FOR PAIN 30 tablet 0    CONTRAVE 8-90 MG per extended release tablet TAKE 2 TABLETS BY MOUTH TWO TIMES A  tablet 0    IMVEXXY MAINTENANCE PACK 10 MCG INST       rOPINIRole (REQUIP) 0.5 MG tablet TAKE 1 TO 2 TABLETS BY MOUTH EVERY DAY 30 TO 60 MINUTES BEFORE BEDTIME      nitrofurantoin, macrocrystal-monohydrate, (MACROBID) 100 MG capsule Take 1 capsule by mouth 2 times daily 14 capsule 0    Calcium Carb-Cholecalciferol (CALCIUM 500 + D3 PO) Take by mouth      DENTA 5000 PLUS 1.1 % CREA BRUSH WITH THIN RIBBON FOR 2 MINUTES AT BEDTIME. DO NOT EAT OR DRINK FOR 30 MINUTES AFTER. BIOTIN PO Take by mouth      MAGNESIUM PO Take by mouth      carisoprodol (SOMA) 250 MG tablet Take 50 mg by mouth 4 times daily. amphetamine-dextroamphetamine (ADDERALL) 20 MG tablet Take 1 tablet by mouth 2 times daily for 30 days. 60 tablet 0    vitamin B-12 (CYANOCOBALAMIN) 1000 MCG tablet Take 1,000 mcg by mouth daily      Multiple Vitamins-Minerals (THERAPEUTIC MULTIVITAMIN-MINERALS) tablet Take 1 tablet by mouth daily      Lactobacillus (PROBIOTIC ACIDOPHILUS PO) Take 1 tablet by mouth daily       No current facility-administered medications for this visit. No Known Allergies    Health Maintenance   Topic Date Due    Varicella vaccine (1 of 2 - 2-dose childhood series) Never done    Pneumococcal 0-64 years Vaccine (1 - PCV) Never done    Hepatitis C screen  Never done    COVID-19 Vaccine (2 - Booster for Pancho series) 09/12/2021    Flu vaccine (1) 09/01/2022    Depression Screen  01/13/2023    Cervical cancer screen  10/13/2026    Lipids  02/14/2027    DTaP/Tdap/Td vaccine (2 - Td or Tdap) 08/28/2028    HIV screen  Completed    Hepatitis A vaccine  Aged Out    Hepatitis B vaccine  Aged Out    Hib vaccine  Aged Out    Meningococcal (ACWY) vaccine  Aged Out       Subjective:     Review of Systems   Constitutional:  Positive for unexpected weight change. Negative for activity change, appetite change, fatigue and fever. Respiratory:  Negative for cough and shortness of breath. Cardiovascular:  Negative for chest pain. Musculoskeletal:  Positive for back pain. Negative for arthralgias, gait problem, joint swelling and myalgias. Skin:  Negative for color change, pallor, rash and wound. Neurological:  Positive for headaches. Negative for weakness and numbness.    Hematological: Negative for adenopathy. Psychiatric/Behavioral:  Positive for sleep disturbance. The patient is not nervous/anxious. Objective:     Physical Exam  Vitals and nursing note reviewed. Constitutional:       General: She is not in acute distress. Appearance: Normal appearance. She is well-developed. She is not ill-appearing. HENT:      Head: Normocephalic and atraumatic. Cardiovascular:      Rate and Rhythm: Normal rate and regular rhythm. Pulmonary:      Effort: Pulmonary effort is normal. No respiratory distress. Breath sounds: Normal breath sounds. No wheezing or rales. Skin:     General: Skin is warm and dry. Coloration: Skin is not pale. Findings: No erythema or rash. Neurological:      Mental Status: She is alert. Psychiatric:         Behavior: Behavior normal.         Thought Content: Thought content normal.         Judgment: Judgment normal.     /64 (Site: Left Upper Arm, Position: Sitting, Cuff Size: Large Adult)   Pulse 63   Temp 97.3 °F (36.3 °C) (Tympanic)   Ht 5' 7\" (1.702 m)   Wt 186 lb (84.4 kg)   SpO2 98%   BMI 29.13 kg/m²     Assessment:       Diagnosis Orders   1. Overweight (BMI 25.0-29.9)  liraglutide-weight management (SAXENDA) 18 MG/3ML SOPN    Insulin Pen Needle (B-D ULTRAFINE III SHORT PEN) 31G X 8 MM MISC      2. Chronic midline low back pain without sciatica                  Plan:      Return in about 6 months (around 1/19/2023) for med review. Continue with neurology and neurosurgeon as needed for chronic back pain. Updated FMLA form for her today. Rewrote order for tinted windows to help with migraines. Patient states this has greatly helped over the last few years. Restart Saxenda. Patient tolerated well in the past.  Current BMI at 29.1. Limited exercise due to chronic back pain.   We discussed healthy diet and regular walking as this is most tolerable for her  Recheck in 4 to 6 months, sooner if any questions or concerns  Patient agreed with treatment plan    Orders Placed This Encounter   Medications    liraglutide-weight management (SAXENDA) 18 MG/3ML SOPN     Sig: Start with 0.6mg injection QD for week 1, then increase by 0.6mg once weekly thereafter, goal of 3mg injection QD     Dispense:  3 pen     Refill:  1    Insulin Pen Needle (B-D ULTRAFINE III SHORT PEN) 31G X 8 MM MISC     Sig: Inject 1 each into the skin daily May substitute with any brand     Dispense:  100 each     Refill:  0       Patient given educational materials - see patient instructions. Discussed use, benefit, and side effects of prescribed medications. All patientquestions answered. Pt voiced understanding. Reviewed health maintenance. Instructedto continue current medications, diet and exercise. Patient agreed with treatmentplan. Follow up as directed. Please note that this chart was generated using voice recognition Dragon dictation software. Although every effort was made to ensure the accuracy of this automated transcription, some errors in transcription may have occurred.      Electronically signed by Yeni Ma PA-C on 7/19/2022 at 3:11 PM

## 2022-07-26 ENCOUNTER — TELEPHONE (OUTPATIENT)
Dept: FAMILY MEDICINE CLINIC | Age: 42
End: 2022-07-26

## 2022-07-26 NOTE — TELEPHONE ENCOUNTER
Patient called back and she does not want to go back on Contrave. I am going  to try for an appeal on SAXENDA.   Will await out come

## 2022-07-26 NOTE — TELEPHONE ENCOUNTER
Patient's insurance will not cover the Saxenda- PA was denied. I called patient to see if she wanted to go back on Contrave?  We can send it through the speciality pharmacy for 99$/month

## 2022-08-02 NOTE — TELEPHONE ENCOUNTER
Called patient advised Jose Gandara was approved.  She can juliet the pharmacy and have them fill it

## 2022-08-08 ENCOUNTER — HOSPITAL ENCOUNTER (OUTPATIENT)
Age: 42
Setting detail: SPECIMEN
Discharge: HOME | End: 2022-08-08

## 2022-08-08 DIAGNOSIS — N39.0 URINARY TRACT INFECTION WITH HEMATURIA, SITE UNSPECIFIED: ICD-10-CM

## 2022-08-08 DIAGNOSIS — R31.9 URINARY TRACT INFECTION WITH HEMATURIA, SITE UNSPECIFIED: ICD-10-CM

## 2022-08-10 LAB
CULTURE: ABNORMAL
SPECIMEN DESCRIPTION: ABNORMAL

## 2022-08-19 RX ORDER — ESTRADIOL 10 UG/1
INSERT VAGINAL
Qty: 8 EACH | Refills: 0 | Status: SHIPPED | OUTPATIENT
Start: 2022-08-19 | End: 2022-08-23 | Stop reason: SDUPTHER

## 2022-08-22 ENCOUNTER — TELEPHONE (OUTPATIENT)
Dept: OBGYN CLINIC | Age: 42
End: 2022-08-22

## 2022-08-22 NOTE — TELEPHONE ENCOUNTER
Pt called her refills on her imvexxy was accidentally sent to [facility] and needs to be send to [facility] mail service I updated pharmacy can we resend and pt will schedule annual

## 2022-08-23 RX ORDER — ESTRADIOL 10 UG/1
INSERT VAGINAL
Qty: 8 EACH | Refills: 0 | Status: SHIPPED | OUTPATIENT
Start: 2022-08-23

## 2022-08-29 ENCOUNTER — HOSPITAL ENCOUNTER (OUTPATIENT)
Age: 42
Setting detail: SPECIMEN
Discharge: HOME | End: 2022-08-29

## 2022-08-30 LAB
CULTURE: NORMAL
SPECIMEN DESCRIPTION: NORMAL

## 2022-09-28 RX ORDER — FLUTICASONE PROPIONATE 50 MCG
SPRAY, SUSPENSION (ML) NASAL
Qty: 16 G | Refills: 0 | Status: SHIPPED | OUTPATIENT
Start: 2022-09-28

## 2022-10-12 DIAGNOSIS — E66.3 OVERWEIGHT (BMI 25.0-29.9): ICD-10-CM

## 2022-10-12 RX ORDER — LIRAGLUTIDE 6 MG/ML
INJECTION, SOLUTION SUBCUTANEOUS
Qty: 15 ML | Refills: 0 | Status: SHIPPED | OUTPATIENT
Start: 2022-10-12

## 2022-11-02 ENCOUNTER — PATIENT MESSAGE (OUTPATIENT)
Dept: FAMILY MEDICINE CLINIC | Age: 42
End: 2022-11-02

## 2022-11-02 DIAGNOSIS — G89.29 CHRONIC MIDLINE LOW BACK PAIN WITHOUT SCIATICA: ICD-10-CM

## 2022-11-02 DIAGNOSIS — M79.605 PAIN IN BOTH LOWER EXTREMITIES: Primary | ICD-10-CM

## 2022-11-02 DIAGNOSIS — M79.604 PAIN IN BOTH LOWER EXTREMITIES: Primary | ICD-10-CM

## 2022-11-02 DIAGNOSIS — M54.50 CHRONIC MIDLINE LOW BACK PAIN WITHOUT SCIATICA: ICD-10-CM

## 2022-11-08 RX ORDER — ESTRADIOL 10 UG/1
INSERT VAGINAL
Qty: 24 EACH | Refills: 3 | Status: SHIPPED | OUTPATIENT
Start: 2022-11-08

## 2022-11-10 DIAGNOSIS — E66.3 OVERWEIGHT (BMI 25.0-29.9): ICD-10-CM

## 2022-11-11 RX ORDER — LIRAGLUTIDE 6 MG/ML
INJECTION, SOLUTION SUBCUTANEOUS
Qty: 15 ML | Refills: 0 | Status: SHIPPED | OUTPATIENT
Start: 2022-11-11

## 2022-12-01 NOTE — TELEPHONE ENCOUNTER
Received refill request Progesterone & Effexor   Last seen [facility] 10/13/21  No future appts

## 2022-12-06 RX ORDER — VENLAFAXINE HYDROCHLORIDE 37.5 MG/1
CAPSULE, EXTENDED RELEASE ORAL
Qty: 90 CAPSULE | Refills: 0 | Status: SHIPPED | OUTPATIENT
Start: 2022-12-06 | End: 2022-12-06 | Stop reason: SDUPTHER

## 2022-12-06 RX ORDER — VENLAFAXINE HYDROCHLORIDE 37.5 MG/1
CAPSULE, EXTENDED RELEASE ORAL
Qty: 30 CAPSULE | Refills: 0 | Status: SHIPPED | OUTPATIENT
Start: 2022-12-06 | End: 2022-12-06

## 2022-12-06 RX ORDER — PROGESTERONE 100 MG/1
CAPSULE ORAL
Qty: 90 CAPSULE | Refills: 1 | Status: SHIPPED | OUTPATIENT
Start: 2022-12-06 | End: 2022-12-06 | Stop reason: SDUPTHER

## 2022-12-06 RX ORDER — PROGESTERONE 100 MG/1
CAPSULE ORAL
Qty: 30 CAPSULE | Refills: 1 | Status: SHIPPED | OUTPATIENT
Start: 2022-12-06 | End: 2022-12-06

## 2022-12-06 NOTE — TELEPHONE ENCOUNTER
Rx were sent to [facility] mail order but needs to be 90 days for both   Can we change the quantity

## 2022-12-07 RX ORDER — PROGESTERONE 100 MG/1
CAPSULE ORAL
Qty: 90 CAPSULE | Refills: 1 | Status: SHIPPED | OUTPATIENT
Start: 2022-12-07

## 2022-12-07 RX ORDER — VENLAFAXINE HYDROCHLORIDE 37.5 MG/1
CAPSULE, EXTENDED RELEASE ORAL
Qty: 90 CAPSULE | Refills: 0 | Status: SHIPPED | OUTPATIENT
Start: 2022-12-07

## 2022-12-17 DIAGNOSIS — E66.3 OVERWEIGHT (BMI 25.0-29.9): ICD-10-CM

## 2022-12-18 RX ORDER — LIRAGLUTIDE 6 MG/ML
INJECTION, SOLUTION SUBCUTANEOUS
Qty: 15 ML | Refills: 0 | Status: SHIPPED | OUTPATIENT
Start: 2022-12-18

## 2022-12-19 RX ORDER — FLUTICASONE PROPIONATE 50 MCG
SPRAY, SUSPENSION (ML) NASAL
Qty: 16 G | Refills: 0 | Status: SHIPPED | OUTPATIENT
Start: 2022-12-19

## 2023-01-05 ENCOUNTER — HOSPITAL ENCOUNTER (OUTPATIENT)
Age: 43
Setting detail: SPECIMEN
Discharge: HOME | End: 2023-01-05

## 2023-01-05 ENCOUNTER — OFFICE VISIT (OUTPATIENT)
Dept: OBGYN CLINIC | Age: 43
End: 2023-01-05
Payer: COMMERCIAL

## 2023-01-05 VITALS
SYSTOLIC BLOOD PRESSURE: 132 MMHG | WEIGHT: 196 LBS | BODY MASS INDEX: 30.76 KG/M2 | DIASTOLIC BLOOD PRESSURE: 68 MMHG | HEIGHT: 67 IN

## 2023-01-05 DIAGNOSIS — N92.6 IRREGULAR PERIODS: ICD-10-CM

## 2023-01-05 DIAGNOSIS — Z12.31 ENCOUNTER FOR SCREENING MAMMOGRAM FOR BREAST CANCER: ICD-10-CM

## 2023-01-05 DIAGNOSIS — Z01.419 ENCOUNTER FOR GYNECOLOGICAL EXAMINATION: Primary | ICD-10-CM

## 2023-01-05 DIAGNOSIS — E28.39 PRIMARY OVARIAN INSUFFICIENCY: ICD-10-CM

## 2023-01-05 PROCEDURE — 99396 PREV VISIT EST AGE 40-64: CPT

## 2023-01-05 PROCEDURE — G8484 FLU IMMUNIZE NO ADMIN: HCPCS

## 2023-01-05 NOTE — PROGRESS NOTES
[address] [facility] - [address] [address] [place] [address]  Dept: [phone]           Patient: Nellie Soler  Primary Care Physician: Keshia Bates PA-C   Chief Complaint   Patient presents with    Annual Exam     Prev Pap: 10/13/21 WNL/HPV Neg; Prev Asim: 21 WNL     Menstrual Problem     Recent period but before that it was       HPI: Nellie Soler is a 40 y.o.  who presents today for her annual women's wellness exam. She does work at [employer]. She , has no children. She is working on adding healthy habits to her routine. Completing a weight loss challenge. She quit smoking one year ago, does still use Nicotine free vape. .. She reports she has been [place], experiencing memory loss, weight gain and fatigue. Imvexxy, prometrium helpful with vaginal dryness. She states she had a period in December. Before that her last cycle was in . Typically goes 2-3 months without a cycle. Bleeding is moderate. Bleeds for 3-4 days. Denies having hot flashes    Her mother was in her late 35s when she went through menopause. OBSTETRICAL & GYNECOLOGICAL HISTORY:  OB History    Para Term  AB Living   2 0 0 0 2 0   SAB IAB Ectopic Molar Multiple Live Births   2 0 0 0 0 0      # Outcome Date GA Lbr Mazin/2nd Weight Sex Delivery Anes PTL Lv   2 SAB            1 SAB              Age of Menarche: 16  Patient's last menstrual period was 2022. Menopause status: history of POI  Taking Calcium/Vitamin D supplement    Sexually Active:  yes with   Dyspareunia: No  STD History: Yes remote history. Fully treated  Birth Control: none - aware she could still get pregnany    Stage 4 cervical cancer in high school requiring leep and colposcopy?     FAMILY HISTORY:  Family History of Breast, Ovarian, Colon or Uterine Cancer: No     family history includes High Cholesterol in her mother; Hypertension in her father; Other in her mother. SOCIAL HISTORY:    reports that she quit smoking about a year ago. Her smoking use included cigarettes. She has a 7.50 pack-year smoking history. She has never used smokeless tobacco. She reports current alcohol use. She reports current drug use. Drug: Marijuana Josee Xuan). MEDICAL HISTORY:  has No Known Allergies. CURRENT MEDS W/ ASSOC DIAG           Start Date End Date     amphetamine-dextroamphetamine (ADDERALL) 20 MG tablet ()  10/24/19  11/18/21     Take 1 tablet by mouth 2 times daily for 30 days. Associated Diagnoses:  Attention deficit disorder, unspecified hyperactivity presence     BIOTIN PO  --  --     Associated Diagnoses:  --     Calcium Carb-Cholecalciferol (CALCIUM 500 + D3 PO)  --  --     Associated Diagnoses:  --     carisoprodol (SOMA) 250 MG tablet  --  --     Associated Diagnoses:  --     CONTRAVE 8-90 MG per extended release tablet  22  --     TAKE 2 TABLETS BY MOUTH TWO TIMES A DAY     Associated Diagnoses:  Overweight (BMI 25.0-29. 9)     DENTA 5000 PLUS 1.1 % CREA  07/15/21  --     Associated Diagnoses:  --     fluticasone (FLONASE) 50 MCG/ACT nasal spray  22  --     INSTILL 2 SPRAYS IN EACH NOSTRIL TWO TIMES A DAY. Associated Diagnoses:  --     ibuprofen (ADVIL;MOTRIN) 800 MG tablet  22  --     TAKE 1 TABLET BY MOUTH EVERY SIX HOURS WITH FOOD OR MILK AS NEEDED FOR PAIN     Associated Diagnoses:  --     IMVEXXY MAINTENANCE PACK 10 MCG INST  22  --     INSERT 1 SUPPOSITORY       VAGINALLY 2 TIMES A WEEK     Associated Diagnoses:  --     Insulin Pen Needle (B-D ULTRAFINE III SHORT PEN) 31G X 8 MM MISC  22  --     Inject 1 each into the skin daily May substitute with any brand     Associated Diagnoses:  Overweight (BMI 25.0-29. 9)     Lactobacillus (PROBIOTIC ACIDOPHILUS PO)  --  --     Associated Diagnoses:  --     liraglutide-weight management (SAXENDA) 18 MG/3ML SOPN  22  --     START WITH 0.6MG UNDER THE SKIN DAILY FOR WEEK 1, THEN INCREASE BY 0.6MG ONCE WEEKLY THEREAFTER TO GOAL OF 3MG DAILY     Associated Diagnoses:  Overweight (BMI 25.0-29. 9)     MAGNESIUM PO  --  --     Associated Diagnoses:  --     Multiple Vitamins-Minerals (THERAPEUTIC MULTIVITAMIN-MINERALS) tablet  --  --     Associated Diagnoses:  --     MYRBETRIQ 50 MG TB24  06/22/22  --     TAKE 1 TABLET DAILY     Associated Diagnoses:  --     phenazopyridine (PYRIDIUM) 200 MG tablet  08/04/22  --     Take 1 tablet by mouth 3 times daily as needed for Pain     Associated Diagnoses:  --     progesterone (PROMETRIUM) 100 MG CAPS capsule  12/07/22  --     TAKE 1 CAPSULE BY MOUTH EVERY DAY AT NIGHT     Associated Diagnoses:  --     rOPINIRole (REQUIP) 0.5 MG tablet  04/22/22  --     Associated Diagnoses:  --     solifenacin (VESICARE) 10 MG tablet  06/22/22  --     TAKE 1 TABLET DAILY     Associated Diagnoses:  --     sulfamethoxazole-trimethoprim (BACTRIM DS) 800-160 MG per tablet  08/10/22  --     Take 1 tablet by mouth in the morning and 1 tablet before bedtime. Associated Diagnoses:  --     venlafaxine (EFFEXOR XR) 37.5 MG extended release capsule  12/07/22  --     TAKE 1 CAPSULE BY MOUTH EVERY DAY     Associated Diagnoses:  --     vitamin B-12 (CYANOCOBALAMIN) 1000 MCG tablet  --  --     Associated Diagnoses:  --            has a past medical history of ADD (attention deficit disorder), Anxiety, Back pain, Carpal tunnel syndrome, bilateral, Chronic back pain, Depression, Foot pain, History of cervical cancer, History of colon polyps, History of gastroesophageal reflux (GERD), History of miscarriage, Hx of blood clots, Hyperlipidemia, Lumbago, Neuroendocrine tumor, OAB (overactive bladder), Obesity (BMI 30-39.9), PUD (peptic ulcer disease), Sciatica, and Urge incontinence. has a past surgical history that includes Dilation and curettage of uterus;  Tonsillectomy; [place] tooth extraction; other surgical history (03/26/2018); back surgery (09/19/2016); pr laminectomy w/o ffd > 2 vert seg lumbar (N/A, 04/23/2018); pr arthrodesis posterior interbody 1 ntrspc lumbar (N/A, 06/25/2018); LEEP (age 17); Upper gastrointestinal endoscopy (05/18/2020); Colonoscopy (05/18/2020); Spinal Cord Stimulator Surgery; Upper gastrointestinal endoscopy (05/28/2020); Upper gastrointestinal endoscopy (N/A, 05/28/2020); Upper gastrointestinal endoscopy (05/28/2020); and Eye surgery. HEALTH MAINTENANCE:  -Covid vaccine and booster recommended. Annual flu vaccine recommended October-April.   -Discussed Gardisil counseling for all patients 10-37 yo.  -Shingles vaccine:  2 doses Shingrix recommended for adults >50y. o, Single dose Zostavax live vaccine recommended for adults >57 y.o.    -Pap Smear collected today    -Mammogram due: now, ordered today    -Colonoscopy due: recommended starting at age 39 Last colonoscopy was within last 5 years. -DEXA due: recommended beginning at age 72                                                                                                                              REVIEW OF SYSTEMS:   Review of Systems   Constitutional:  Positive for fatigue. Negative for chills and fever. Endocrine: Positive for heat intolerance. Genitourinary:  Negative for decreased urine volume, difficulty urinating, dyspareunia, dysuria, frequency, genital sores, hematuria, menstrual problem, pelvic pain, urgency, vaginal bleeding, vaginal discharge and vaginal pain. All other systems reviewed and are negative. PHYSICAL EXAM:     Vitals:    01/05/23 1335   BP: 132/68   Position: Sitting   Cuff Size: Medium Adult   Weight: 196 lb (88.9 kg)   Height: 5' 7\" (1.702 m)      Physical Exam  Constitutional:       General: She is awake. She is not in acute distress. Appearance: Normal appearance. She is well-developed and well-groomed. She is not ill-appearing, toxic-appearing or diaphoretic.    Genitourinary:      Urethral meatus normal.      Right Labia: No rash, tenderness, lesions, skin changes or Bartholin's cyst.     Left Labia: No tenderness, lesions, skin changes, Bartholin's cyst or rash. No vaginal discharge or tenderness. No cervical motion tenderness, discharge or friability. Breasts:     Breasts are symmetrical.      Breasts are soft. Right: Present. No swelling, bleeding, inverted nipple, mass, nipple discharge, skin change, tenderness or breast implant. Left: Present. No swelling, bleeding, inverted nipple, mass, nipple discharge, skin change, tenderness or breast implant. HENT:      Head: Normocephalic and atraumatic. Nose: Nose normal.   Eyes:      Extraocular Movements: Extraocular movements intact. Pulmonary:      Effort: Pulmonary effort is normal.   Musculoskeletal:         General: Normal range of motion. Cervical back: Normal range of motion. Lymphadenopathy:      Upper Body:      Right upper body: No supraclavicular or axillary adenopathy. Left upper body: No supraclavicular or axillary adenopathy. Neurological:      General: No focal deficit present. Mental Status: She is alert and oriented to person, place, and time. Mental status is at baseline. Psychiatric:         Attention and Perception: Attention and perception normal.         Mood and Affect: Mood normal.         Speech: Speech normal.         Behavior: Behavior normal. Behavior is cooperative. Thought Content: Thought content normal.         Cognition and Memory: Cognition and memory normal.         Judgment: Judgment normal.   Vitals reviewed. ASSESSMENT & PLAN:    Mayra Zavala is a 40 y.o. female  here for her annual women's wellness exam. Today, we discussed    Diagnosis Orders   1. Encounter for gynecological examination  PAP SMEAR      2. Encounter for screening mammogram for breast cancer  KALEB NEDA DIGITAL SCREEN BILATERAL      3.  Irregular periods  TSH With Reflex Ft4    Prolactin    Estrogens, total Follicle Stimulating Hormone    US PELVIS COMPLETE      4. Primary ovarian insufficiency  TSH With Reflex Ft4    Prolactin    Estrogens, total    Follicle Stimulating Hormone    US PELVIS COMPLETE          No follow-ups on file. Counseling Completed:  -Discussed recommendations to repeat pap as per American Society for Colposcopy and Cervical Pathology guidelines.  -Discussed need for mammograms every 1 year, If >42 yo and last mammogram was negative.  -Discussed Calcium and Vitamin D dosing.  -Discussed need for colonoscopy screening as well as onset for bone density testing.  -Discussed birth control and barrier recommendations. Discussed STD counseling and prevention.  -Hereditary Breast, Ovarian, Colon and Uterine Cancer screening discussed.          -Tobacco & Secondary smoke risks discussed; with recommendation for cessation and avoidance.  -Routine health maintenance per patients PCP discussed. Return to this office annually and PRN.     The patient was seen and evaluated face to face by SHREE Vidal CNP   1/10/2023, 12:11 PM

## 2023-01-10 ENCOUNTER — HOSPITAL ENCOUNTER (OUTPATIENT)
Dept: MAMMOGRAPHY | Age: 43
Discharge: HOME | End: 2023-01-12
Payer: COMMERCIAL

## 2023-01-10 ENCOUNTER — HOSPITAL ENCOUNTER (OUTPATIENT)
Age: 43
Discharge: HOME | End: 2023-01-10
Payer: COMMERCIAL

## 2023-01-10 DIAGNOSIS — Z12.31 ENCOUNTER FOR SCREENING MAMMOGRAM FOR BREAST CANCER: ICD-10-CM

## 2023-01-10 DIAGNOSIS — E28.39 PRIMARY OVARIAN INSUFFICIENCY: ICD-10-CM

## 2023-01-10 DIAGNOSIS — N92.6 IRREGULAR PERIODS: ICD-10-CM

## 2023-01-10 LAB
FOLLICLE STIMULATING HORMONE: 24.7 MIU/ML (ref 1.7–21.5)
PROLACTIN: 16.45 NG/ML (ref 4.79–23.3)
TSH SERPL DL<=0.05 MIU/L-ACNC: 3.18 UIU/ML (ref 0.3–5)

## 2023-01-10 PROCEDURE — 77063 BREAST TOMOSYNTHESIS BI: CPT

## 2023-01-10 PROCEDURE — 84146 ASSAY OF PROLACTIN: CPT

## 2023-01-10 PROCEDURE — 84443 ASSAY THYROID STIM HORMONE: CPT

## 2023-01-10 PROCEDURE — 82671 ASSAY OF ESTROGENS: CPT

## 2023-01-10 PROCEDURE — 36415 COLL VENOUS BLD VENIPUNCTURE: CPT

## 2023-01-10 PROCEDURE — 83001 ASSAY OF GONADOTROPIN (FSH): CPT

## 2023-01-18 DIAGNOSIS — E66.3 OVERWEIGHT (BMI 25.0-29.9): ICD-10-CM

## 2023-01-19 RX ORDER — LIRAGLUTIDE 6 MG/ML
INJECTION, SOLUTION SUBCUTANEOUS
Qty: 15 ML | Refills: 0 | Status: SHIPPED | OUTPATIENT
Start: 2023-01-19

## 2023-01-24 DIAGNOSIS — M62.89 PELVIC FLOOR DYSFUNCTION: Primary | ICD-10-CM

## 2023-02-13 ENCOUNTER — OFFICE VISIT (OUTPATIENT)
Dept: FAMILY MEDICINE CLINIC | Age: 43
End: 2023-02-13
Payer: COMMERCIAL

## 2023-02-13 VITALS
SYSTOLIC BLOOD PRESSURE: 124 MMHG | WEIGHT: 190 LBS | BODY MASS INDEX: 29.82 KG/M2 | DIASTOLIC BLOOD PRESSURE: 84 MMHG | HEIGHT: 67 IN | HEART RATE: 66 BPM | TEMPERATURE: 97.4 F | OXYGEN SATURATION: 98 %

## 2023-02-13 DIAGNOSIS — E66.3 OVERWEIGHT (BMI 25.0-29.9): Primary | ICD-10-CM

## 2023-02-13 PROCEDURE — 99213 OFFICE O/P EST LOW 20 MIN: CPT | Performed by: PHYSICIAN ASSISTANT

## 2023-02-13 PROCEDURE — G8417 CALC BMI ABV UP PARAM F/U: HCPCS | Performed by: PHYSICIAN ASSISTANT

## 2023-02-13 PROCEDURE — G8484 FLU IMMUNIZE NO ADMIN: HCPCS | Performed by: PHYSICIAN ASSISTANT

## 2023-02-13 PROCEDURE — 1036F TOBACCO NON-USER: CPT | Performed by: PHYSICIAN ASSISTANT

## 2023-02-13 PROCEDURE — G8427 DOCREV CUR MEDS BY ELIG CLIN: HCPCS | Performed by: PHYSICIAN ASSISTANT

## 2023-02-13 RX ORDER — SEMAGLUTIDE 0.25 MG/.5ML
0.25 INJECTION, SOLUTION SUBCUTANEOUS
Qty: 1 ML | Refills: 0 | Status: SHIPPED | OUTPATIENT
Start: 2023-02-13

## 2023-02-13 RX ORDER — TOPIRAMATE 25 MG/1
TABLET ORAL
COMMUNITY
Start: 2023-02-08

## 2023-02-13 RX ORDER — CLONAZEPAM 0.5 MG/1
TABLET ORAL
COMMUNITY
Start: 2023-01-18

## 2023-02-13 SDOH — ECONOMIC STABILITY: FOOD INSECURITY: WITHIN THE PAST 12 MONTHS, THE FOOD YOU BOUGHT JUST DIDN'T LAST AND YOU DIDN'T HAVE MONEY TO GET MORE.: NEVER TRUE

## 2023-02-13 SDOH — ECONOMIC STABILITY: FOOD INSECURITY: WITHIN THE PAST 12 MONTHS, YOU WORRIED THAT YOUR FOOD WOULD RUN OUT BEFORE YOU GOT MONEY TO BUY MORE.: NEVER TRUE

## 2023-02-13 SDOH — ECONOMIC STABILITY: HOUSING INSECURITY
IN THE LAST 12 MONTHS, WAS THERE A TIME WHEN YOU DID NOT HAVE A STEADY PLACE TO SLEEP OR SLEPT IN A SHELTER (INCLUDING NOW)?: NO

## 2023-02-13 SDOH — ECONOMIC STABILITY: INCOME INSECURITY: HOW HARD IS IT FOR YOU TO PAY FOR THE VERY BASICS LIKE FOOD, HOUSING, MEDICAL CARE, AND HEATING?: NOT HARD AT ALL

## 2023-02-13 ASSESSMENT — ENCOUNTER SYMPTOMS: COLOR CHANGE: 0

## 2023-02-13 ASSESSMENT — PATIENT HEALTH QUESTIONNAIRE - PHQ9
SUM OF ALL RESPONSES TO PHQ QUESTIONS 1-9: 0
SUM OF ALL RESPONSES TO PHQ QUESTIONS 1-9: 0
1. LITTLE INTEREST OR PLEASURE IN DOING THINGS: 0
SUM OF ALL RESPONSES TO PHQ QUESTIONS 1-9: 0
SUM OF ALL RESPONSES TO PHQ QUESTIONS 1-9: 0
2. FEELING DOWN, DEPRESSED OR HOPELESS: 0
SUM OF ALL RESPONSES TO PHQ9 QUESTIONS 1 & 2: 0

## 2023-02-13 NOTE — PROGRESS NOTES
Visit Information    Have you changed or started any medications since your last visit including any over-the-counter medicines, vitamins, or herbal medicines? no   Are you having any side effects from any of your medications? -  no  Have you stopped taking any of your medications? Is so, why? -  no    Have you seen any other physician or provider since your last visit? No  Have you had any other diagnostic tests since your last visit? No  Have you been seen in the emergency room and/or had an admission to a hospital since we last saw you? No  Have you had your routine dental cleaning in the past 6 months? no    Have you activated your [employer] account? If not, what are your barriers?  Yes     Patient Care Team:  Jimmy Toribio PA-C as PCP - General (Family Medicine)  Jimmy Toribio PA-C as PCP - Empaneled Provider  Raul Keith MD as Consulting Physician (Urology)    Medical History Review  Past Medical, Family, and Social History reviewed and  contribute to the patient presenting condition    Health Maintenance   Topic Date Due    Varicella vaccine (1 of 2 - 2-dose childhood series) Never done    Hepatitis C screen  Never done    COVID-19 Vaccine (2 - Booster for Pancho series) 09/12/2021    Flu vaccine (1) Never done    Depression Screen  01/13/2023    Cervical cancer screen  10/13/2026    Lipids  02/14/2027    DTaP/Tdap/Td vaccine (2 - Td or Tdap) 08/28/2028    HIV screen  Completed    Hepatitis A vaccine  Aged Out    Hib vaccine  Aged Out    Meningococcal (ACWY) vaccine  Aged Out    Pneumococcal 0-64 years Vaccine  Aged Out

## 2023-02-13 NOTE — PROGRESS NOTES
[facility]  [address] [address]  Dept: [phone]  Dept Fax: [phone]    Denisa Avendaño is a 40 y.o. female who presents today for her medical conditions/complaintsas noted below. Denisa Avendaño is c/o of   Chief Complaint   Patient presents with    Weight Management     Discuss changing medications- switching off of saxenda to wegovy          HPI:     HPI    Patient is here to discuss her weight. She has been using saxenda for over a year and still notes that her appetite is really strong. She is interested in trying wegovy at this point to see if better at suppressing her appetite. She has tolerating the saxenda well, has not had any side effects. She continues to eat healthy, is presently in a weight loss challenge at work and also tries to exercise regularly. She walks over her breaks at work, and does 100 wall bridges and pillates through the week at least 5 times. Her goal weight is around 170  Patient also updates that she is following with Dr. Cyndy Alcaraz neurology for leg pain. She will get us a copy of the testing done. Also had screening labs for life insurance.  She will get us a copy of these as well    No results found for: LABA1C          ( goal A1Cis < 7)   No results found for: LABMICR  LDL Cholesterol (mg/dL)   Date Value   02/14/2022 126   01/29/2019 100   05/18/2018 145 (H)       (goal LDL is <100)   AST (U/L)   Date Value   02/14/2022 20     ALT (U/L)   Date Value   02/14/2022 18     BUN (mg/dL)   Date Value   02/14/2022 14     BP Readings from Last 3 Encounters:   02/13/23 124/84   01/05/23 132/68   07/19/22 114/64          (goal 120/80)    Past Medical History:   Diagnosis Date    ADD (attention deficit disorder)     Anxiety     Back pain     Carpal tunnel syndrome, bilateral 05/19/2016    Chronic back pain     started pain mgmnt 2/218    Depression     Foot pain     left (d/t back)    History of cervical cancer age 17    History of colon polyps     x 1 precancerous polyp    History of gastroesophageal reflux (GERD)     \"mild\"  no medications    History of miscarriage     x 2    Hx of blood clots     right leg superficial blood clot    Hyperlipidemia     watching diet    Lumbago     Neuroendocrine tumor     OAB (overactive bladder) 2017    Obesity (BMI 30-39. 9)     PUD (peptic ulcer disease)     Sciatica     Urge incontinence       Past Surgical History:   Procedure Laterality Date    BACK SURGERY  2016    Lumbar interbody fusion posterior, L5-S1, had 5 surgeries total    COLONOSCOPY  2020    DILATION AND CURETTAGE OF UTERUS      EYE SURGERY      R    LEEP  age 17    OTHER SURGICAL HISTORY  2018    myelogram    CO ARTHRODESIS POSTERIOR INTERBODY 1 NTRSPC LUMBAR N/A 2018    LUMBAR INTERBODY FUSION POSTERIOR L45 performed by Brigitte Osborn MD at [address] [place] [address] W/O FFD > 2 VERT SEG LUMBAR N/A 2018    LUMBAR MICRO DISKECTOMY   L45 performed by Brigitte Osborn MD at [place] [address] ENDOSCOPY  2020    UPPER GASTROINTESTINAL ENDOSCOPY  2020    polypectomy    UPPER GASTROINTESTINAL ENDOSCOPY N/A 2020    EGD BIOPSY **CASE IN OR. WITH G.I. STAFF**  EGD ESOPHAGOGASTRODUODENOSCOPY performed by Rolan Vargas MD at [address] [place] [address]  2020    EGD CONTROL HEMORRHAGE.  APC performed by Rolan Vargas MD at [facility] Endoscopy    WISDOM TOOTH EXTRACTION         Family History   Problem Relation Age of Onset    High Cholesterol Mother     Other Mother         premature menopause    Hypertension Father     Diabetes Neg Hx     Cancer Neg Hx        Social History     Tobacco Use    Smoking status: Former     Packs/day: 0.50     Years: 15.00     Pack years: 7.50     Types: Cigarettes     Quit date: 2021     Years since quittin.1    Smokeless tobacco: Never   Substance Use Topics    Alcohol use: Yes     Types: 1 Glasses of wine per week     Comment: Maybe a couple drinks a month      Current Outpatient Medications   Medication Sig Dispense Refill    clonazePAM (KLONOPIN) 0.5 MG tablet TAKE 1 TABLET BY MOUTH IN THE MORNING, 1 TAB IN THE AFTERNOON, AND 2 TABS AT BEDTIME DAILY AS NEEDED      topiramate (TOPAMAX) 25 MG tablet TAKE 1 TABLET BY MOUTH IN THE MORNING FOR 1 WEEK, THEN 1 TABLET TWICE A DAY FOR 1 WEEK, THEN 2 TABLETS IN THE MORNING AND 1 TAB IN THE EVENI      Semaglutide-Weight Management (WEGOVY) 0.25 MG/0.5ML SOAJ SC injection Inject 0.25 mg into the skin every 7 days 1 mL 0    fluticasone (FLONASE) 50 MCG/ACT nasal spray INSTILL 2 SPRAYS IN EACH NOSTRIL TWO TIMES A DAY. 16 g 0    venlafaxine (EFFEXOR XR) 37.5 MG extended release capsule TAKE 1 CAPSULE BY MOUTH EVERY DAY 90 capsule 0    progesterone (PROMETRIUM) 100 MG CAPS capsule TAKE 1 CAPSULE BY MOUTH EVERY DAY AT NIGHT 90 capsule 1    IMVEXXY MAINTENANCE PACK 10 MCG INST INSERT 1 SUPPOSITORY       VAGINALLY 2 TIMES A WEEK 24 each 3    Insulin Pen Needle (B-D ULTRAFINE III SHORT PEN) 31G X 8 MM MISC Inject 1 each into the skin daily May substitute with any brand 100 each 0    MYRBETRIQ 50 MG TB24 TAKE 1 TABLET DAILY 90 tablet 3    solifenacin (VESICARE) 10 MG tablet TAKE 1 TABLET DAILY 90 tablet 3    ibuprofen (ADVIL;MOTRIN) 800 MG tablet TAKE 1 TABLET BY MOUTH EVERY SIX HOURS WITH FOOD OR MILK AS NEEDED FOR PAIN 30 tablet 0    rOPINIRole (REQUIP) 0.5 MG tablet TAKE 1 TO 2 TABLETS BY MOUTH EVERY DAY 30 TO 60 MINUTES BEFORE BEDTIME      Calcium Carb-Cholecalciferol (CALCIUM 500 + D3 PO) Take by mouth      DENTA 5000 PLUS 1.1 % CREA BRUSH WITH THIN RIBBON FOR 2 MINUTES AT BEDTIME. DO NOT EAT OR DRINK FOR 30 MINUTES AFTER.       BIOTIN PO Take by mouth      MAGNESIUM PO Take by mouth      vitamin B-12 (CYANOCOBALAMIN) 1000 MCG tablet Take 1,000 mcg by mouth daily      Multiple Vitamins-Minerals (THERAPEUTIC MULTIVITAMIN-MINERALS) tablet Take 1 tablet by mouth daily      Lactobacillus (PROBIOTIC ACIDOPHILUS PO) Take 1 tablet by mouth daily      amphetamine-dextroamphetamine (ADDERALL) 20 MG tablet Take 1 tablet by mouth 2 times daily for 30 days. 60 tablet 0     No current facility-administered medications for this visit. No Known Allergies    Health Maintenance   Topic Date Due    Varicella vaccine (1 of 2 - 2-dose childhood series) Never done    Hepatitis C screen  Never done    COVID-19 Vaccine (2 - Booster for Pancho series) 09/12/2021    Flu vaccine (1) Never done    Depression Screen  01/13/2023    Cervical cancer screen  10/13/2026    Lipids  02/14/2027    DTaP/Tdap/Td vaccine (2 - Td or Tdap) 08/28/2028    HIV screen  Completed    Hepatitis A vaccine  Aged Out    Hib vaccine  Aged Out    Meningococcal (ACWY) vaccine  Aged Out    Pneumococcal 0-64 years Vaccine  Aged Out       Subjective:     Review of Systems   Constitutional:  Negative for activity change, appetite change, fatigue, fever and unexpected weight change. Skin:  Negative for color change and pallor. Neurological:  Negative for weakness and numbness. Hematological:  Negative for adenopathy. Psychiatric/Behavioral:  The patient is not nervous/anxious. Objective:     Physical Exam  Vitals and nursing note reviewed. Constitutional:       Appearance: Normal appearance. She is well-developed. HENT:      Head: Normocephalic and atraumatic. Cardiovascular:      Rate and Rhythm: Normal rate and regular rhythm. Heart sounds: No murmur heard. Pulmonary:      Effort: Pulmonary effort is normal. No respiratory distress. Breath sounds: Normal breath sounds. No wheezing, rhonchi or rales. Skin:     General: Skin is warm and dry. Coloration: Skin is not pale. Findings: No erythema or rash. Neurological:      Mental Status: She is alert and oriented to person, place, and time.    Psychiatric:         Mood and Affect: Mood normal.         Behavior: Behavior normal.         Thought Content: Thought content normal.         Judgment: Judgment normal.     /84 (Site: Left Upper Arm, Position: Sitting, Cuff Size: Large Adult)   Pulse 66   Temp 97.4 °F (36.3 °C) (Tympanic)   Ht 5' 7\" (1.702 m)   Wt 190 lb (86.2 kg)   SpO2 98%   BMI 29.76 kg/m²     Assessment:       Diagnosis Orders   1. Overweight (BMI 25.0-29. 9)  Semaglutide-Weight Management (WEGOVY) 0.25 MG/0.5ML SOAJ SC injection                Plan:      Return in about 4 months (around 6/13/2023) for med review, weight check. Will try change to [facility]. Patient will check insurance coverage. Use and SE reviewed. Has tolerated saxenda well. Continue healthy diet and regular exercise  Patient agreed with treatment plan    Orders Placed This Encounter   Medications    Semaglutide-Weight Management (WEGOVY) 0.25 MG/0.5ML SOAJ SC injection     Sig: Inject 0.25 mg into the skin every 7 days     Dispense:  1 mL     Refill:  0       Patient given educational materials - see patient instructions. Discussed use, benefit, and side effects of prescribed medications. All patientquestions answered. Pt voiced understanding. Reviewed health maintenance. Instructedto continue current medications, diet and exercise. Patient agreed with treatmentplan. Follow up as directed. Please note that this chart was generated using voice recognition Dragon dictation software. Although every effort was made to ensure the accuracy of this automated transcription, some errors in transcription may have occurred.      Electronically signed by Mercedes Sharma PA-C on 2/13/2023 at 1:58 PM

## 2023-02-21 DIAGNOSIS — N81.10 CYSTOCELE, UNSPECIFIED (CODE): Primary | ICD-10-CM

## 2023-02-28 ENCOUNTER — PATIENT MESSAGE (OUTPATIENT)
Dept: FAMILY MEDICINE CLINIC | Age: 43
End: 2023-02-28

## 2023-02-28 DIAGNOSIS — G56.03 BILATERAL CARPAL TUNNEL SYNDROME: Primary | ICD-10-CM

## 2023-03-01 NOTE — TELEPHONE ENCOUNTER
From: Ivonne Cargo  To: Lizy Crowe  Sent: 2/28/2023 5:16 PM EST  Subject: Referral for carpal tunnel    Hello! My carpal tunnel has been getting very unbearable lately to the point braces at night don't help, my arms/hands will get tingly and start to go numb while I'm walking and they are just straight down, not even bent or holding anything. A friend has had surgery with the attached surgeon, so I'd like to try a consult with him first if that's ok with you. Both wrists are affected, but my right is worse than my left. Thank you.

## 2023-03-12 DIAGNOSIS — E66.3 OVERWEIGHT (BMI 25.0-29.9): ICD-10-CM

## 2023-03-13 RX ORDER — SEMAGLUTIDE 0.25 MG/.5ML
0.25 INJECTION, SOLUTION SUBCUTANEOUS
Qty: 2 ML | Refills: 0 | Status: SHIPPED | OUTPATIENT
Start: 2023-03-13

## 2023-03-16 DIAGNOSIS — F41.9 ANXIETY: Primary | ICD-10-CM

## 2023-03-16 RX ORDER — VENLAFAXINE HYDROCHLORIDE 75 MG/1
CAPSULE, EXTENDED RELEASE ORAL
Qty: 60 CAPSULE | Refills: 0 | Status: SHIPPED | OUTPATIENT
Start: 2023-03-16

## 2023-03-16 NOTE — PROGRESS NOTES
Patient is requesting increase in Effexor dosage and planning to discontinue hormonal therapy. I am referring to psychiatry and prescribing a 2 month supply until she can get in with psychiatry. Per [employer], regarding effexor for VMS:  \"Oral: IR and ER hydrochloride: Initial: 37.5 mg once daily; may increase dose after ? 1 week based on response and tolerability to 75 mg once daily for ER hydrochloride or 75 mg/day in 2 to 3 divided doses for immediate release (Ref). Note: Doses up to 150 mg/day have been evaluated; however, compared to 75 mg/day, there was no greater efficacy and adverse effects were increased (Ref). Per [employer], For generalized anxiety disorder, \"venlafaxine ER hydrochloride: Initial: 37.5 to 75 mg once daily; in patients who are initiated at 37.5 mg once daily, increase to 75 mg once daily after 4 to 7 days; may then be increased by ?75 mg/day increments at intervals of ?4 days as tolerated; usual dosage: 75 to 225 mg once daily (maximum dose: 225 mg/day). \"

## 2023-04-17 RX ORDER — FLUTICASONE PROPIONATE 50 MCG
SPRAY, SUSPENSION (ML) NASAL
Qty: 16 G | Refills: 0 | Status: SHIPPED | OUTPATIENT
Start: 2023-04-17

## 2023-04-17 RX ORDER — IBUPROFEN 800 MG/1
TABLET ORAL
Qty: 30 TABLET | Refills: 0 | Status: SHIPPED | OUTPATIENT
Start: 2023-04-17

## 2023-04-18 ENCOUNTER — HOSPITAL ENCOUNTER (OUTPATIENT)
Age: 43
Setting detail: SPECIMEN
Discharge: HOME | End: 2023-04-18

## 2023-04-21 LAB
MICROORGANISM SPEC CULT: ABNORMAL
SPECIMEN DESCRIPTION: ABNORMAL

## 2023-05-01 ENCOUNTER — HOSPITAL ENCOUNTER (OUTPATIENT)
Age: 43
Setting detail: SPECIMEN
Discharge: HOME | End: 2023-05-01

## 2023-05-01 DIAGNOSIS — N30.00 ACUTE CYSTITIS WITHOUT HEMATURIA: ICD-10-CM

## 2023-05-03 LAB
MICROORGANISM SPEC CULT: ABNORMAL
SPECIMEN DESCRIPTION: ABNORMAL

## 2023-05-04 PROBLEM — N39.3 STRESS INCONTINENCE IN FEMALE: Status: ACTIVE | Noted: 2023-05-04

## 2023-05-04 PROBLEM — N39.0 RECURRENT UTI: Status: ACTIVE | Noted: 2023-05-04

## 2023-05-15 ENCOUNTER — HOSPITAL ENCOUNTER (OUTPATIENT)
Age: 43
Setting detail: SPECIMEN
Discharge: HOME | End: 2023-05-15

## 2023-05-15 ENCOUNTER — TELEPHONE (OUTPATIENT)
Dept: FAMILY MEDICINE CLINIC | Age: 43
End: 2023-05-15

## 2023-05-15 DIAGNOSIS — N39.0 RECURRENT UTI: ICD-10-CM

## 2023-05-15 RX ORDER — SEMAGLUTIDE 1 MG/.5ML
1 INJECTION, SOLUTION SUBCUTANEOUS
Qty: 2 ML | Refills: 0 | Status: SHIPPED | OUTPATIENT
Start: 2023-05-15 | End: 2023-05-18 | Stop reason: SDUPTHER

## 2023-05-15 NOTE — TELEPHONE ENCOUNTER
Patient called and stated that the wegovy is on back order for a couple of weeks and pt stated the only dose that is available is the next dose. Pt would like to know if you want to increase it. Please advise.

## 2023-05-16 LAB
MICROORGANISM SPEC CULT: NORMAL
SPECIMEN DESCRIPTION: NORMAL

## 2023-05-17 ENCOUNTER — PATIENT MESSAGE (OUTPATIENT)
Dept: FAMILY MEDICINE CLINIC | Age: 43
End: 2023-05-17

## 2023-05-17 NOTE — TELEPHONE ENCOUNTER
From: Aracelis Shrestha  To: Lakeisha Given  Sent: 5/17/2023 4:03 PM EDT  Subject: [facility] and [facility] are both now on backorder for . 5 and 1mg. I'm not sure if i should keep calling around to other pharmacies or maybe just have you send in a 90 day rx to [facility] and hope they have it in stock. Any recommendations? I'm due on Sunday for my next shot.

## 2023-05-18 RX ORDER — SEMAGLUTIDE 1 MG/.5ML
1 INJECTION, SOLUTION SUBCUTANEOUS
Qty: 6 ML | Refills: 0 | Status: SHIPPED | OUTPATIENT
Start: 2023-05-18

## 2023-05-24 ENCOUNTER — TELEPHONE (OUTPATIENT)
Dept: FAMILY MEDICINE CLINIC | Age: 43
End: 2023-05-24

## 2023-05-24 NOTE — TELEPHONE ENCOUNTER
[facility] called to let us know that the wegovy is on backorder.    Ref# [id number]  Call back number [phone]

## 2023-05-24 NOTE — TELEPHONE ENCOUNTER
Advise patient we are seeing this more and more.  She can try calling other pharmacies or consider taking a break from it until the availability improves

## 2023-06-09 RX ORDER — SEMAGLUTIDE 1 MG/.5ML
1 INJECTION, SOLUTION SUBCUTANEOUS
Qty: 6 ML | Refills: 0 | Status: SHIPPED | OUTPATIENT
Start: 2023-06-09

## 2023-06-21 ENCOUNTER — NURSE ONLY (OUTPATIENT)
Dept: PRIMARY CARE CLINIC | Age: 43
End: 2023-06-21

## 2023-06-21 ENCOUNTER — OFFICE VISIT (OUTPATIENT)
Dept: FAMILY MEDICINE CLINIC | Age: 43
End: 2023-06-21
Payer: COMMERCIAL

## 2023-06-21 VITALS
WEIGHT: 181 LBS | HEART RATE: 74 BPM | OXYGEN SATURATION: 97 % | TEMPERATURE: 97.8 F | SYSTOLIC BLOOD PRESSURE: 116 MMHG | HEIGHT: 67 IN | DIASTOLIC BLOOD PRESSURE: 66 MMHG | BODY MASS INDEX: 28.41 KG/M2

## 2023-06-21 DIAGNOSIS — M79.672 LEFT FOOT PAIN: ICD-10-CM

## 2023-06-21 DIAGNOSIS — E66.3 OVERWEIGHT: Primary | ICD-10-CM

## 2023-06-21 DIAGNOSIS — M79.672 LEFT FOOT PAIN: Primary | ICD-10-CM

## 2023-06-21 PROCEDURE — 1036F TOBACCO NON-USER: CPT | Performed by: PHYSICIAN ASSISTANT

## 2023-06-21 PROCEDURE — G8417 CALC BMI ABV UP PARAM F/U: HCPCS | Performed by: PHYSICIAN ASSISTANT

## 2023-06-21 PROCEDURE — 99213 OFFICE O/P EST LOW 20 MIN: CPT | Performed by: PHYSICIAN ASSISTANT

## 2023-06-21 PROCEDURE — G8427 DOCREV CUR MEDS BY ELIG CLIN: HCPCS | Performed by: PHYSICIAN ASSISTANT

## 2023-06-21 RX ORDER — DEXTROAMPHETAMINE SACCHARATE, AMPHETAMINE ASPARTATE, DEXTROAMPHETAMINE SULFATE AND AMPHETAMINE SULFATE 5; 5; 5; 5 MG/1; MG/1; MG/1; MG/1
1 TABLET ORAL 2 TIMES DAILY
COMMUNITY
Start: 2023-05-20

## 2023-06-21 ASSESSMENT — ENCOUNTER SYMPTOMS
VOMITING: 0
ABDOMINAL PAIN: 0
SHORTNESS OF BREATH: 0
NAUSEA: 0
COUGH: 0
COLOR CHANGE: 0
CONSTIPATION: 0
DIARRHEA: 0

## 2023-06-21 NOTE — PROGRESS NOTES
Visit Information    Have you changed or started any medications since your last visit including any over-the-counter medicines, vitamins, or herbal medicines? no   Are you having any side effects from any of your medications? -  no  Have you stopped taking any of your medications? Is so, why? -  no    Have you seen any other physician or provider since your last visit? No  Have you had any other diagnostic tests since your last visit? No  Have you been seen in the emergency room and/or had an admission to a hospital since we last saw you? No  Have you had your routine dental cleaning in the past 6 months? no    Have you activated your [facility] account? If not, what are your barriers?  Yes     Patient Care Team:  Estefania Sol PA-C as PCP - General (Family Medicine)  Estefania Sol PA-C as PCP - Empaneled Provider  Godwin Abdi MD as Consulting Physician (Urology)    Medical History Review  Past Medical, Family, and Social History reviewed and  contribute to the patient presenting condition    Health Maintenance   Topic Date Due    Varicella vaccine (1 of 2 - 2-dose childhood series) Never done    Hepatitis C screen  Never done    COVID-19 Vaccine (2 - Booster for Pancho series) 09/12/2021    Flu vaccine (Season Ended) 08/01/2023    Depression Screen  02/13/2024    Cervical cancer screen  10/13/2026    Lipids  02/14/2027    DTaP/Tdap/Td vaccine (2 - Td or Tdap) 08/28/2028    HIV screen  Completed    Hepatitis A vaccine  Aged Out    Hib vaccine  Aged Out    Meningococcal (ACWY) vaccine  Aged Out    Pneumococcal 0-64 years Vaccine  Aged Out    Diabetes screen  Discontinued
Topics    Alcohol use: Yes     Types: 1 Glasses of wine per week     Comment: Maybe a couple drinks a month      Current Outpatient Medications   Medication Sig Dispense Refill    amphetamine-dextroamphetamine (ADDERALL) 20 MG tablet Take 1 tablet by mouth 2 times daily. solifenacin (VESICARE) 10 MG tablet TAKE 1 TABLET DAILY 90 tablet 1    Semaglutide-Weight Management (WEGOVY) 1 MG/0.5ML SOAJ SC injection Inject 1 mg into the skin every 7 days 6 mL 0    venlafaxine (EFFEXOR XR) 150 MG extended release capsule Take by mouth daily      vibegron (GEMTESA) 75 MG TABS tablet Take 1 tablet by mouth daily 90 tablet 3    fluticasone (FLONASE) 50 MCG/ACT nasal spray INSTILL 2 SPRAYS IN EACH NOSTRIL TWO TIMES A DAY. 16 g 0    ibuprofen (ADVIL;MOTRIN) 800 MG tablet TAKE 1 TABLET BY MOUTH EVERY SIX HOURS WITH FOOD OR MILK AS NEEDED FOR PAIN. 30 tablet 0    clonazePAM (KLONOPIN) 0.5 MG tablet TAKE 1 TABLET BY MOUTH IN THE MORNING, 1 TAB IN THE AFTERNOON, AND 2 TABS AT BEDTIME DAILY AS NEEDED      Calcium Carb-Cholecalciferol (CALCIUM 500 + D3 PO) Take by mouth      DENTA 5000 PLUS 1.1 % CREA BRUSH WITH THIN RIBBON FOR 2 MINUTES AT BEDTIME. DO NOT EAT OR DRINK FOR 30 MINUTES AFTER. BIOTIN PO Take by mouth      vitamin B-12 (CYANOCOBALAMIN) 1000 MCG tablet Take 1 tablet by mouth daily      Multiple Vitamins-Minerals (THERAPEUTIC MULTIVITAMIN-MINERALS) tablet Take 1 tablet by mouth daily      Lactobacillus (PROBIOTIC ACIDOPHILUS PO) Take 1 tablet by mouth daily       No current facility-administered medications for this visit.      No Known Allergies    Health Maintenance   Topic Date Due    Varicella vaccine (1 of 2 - 2-dose childhood series) Never done    Hepatitis C screen  Never done    COVID-19 Vaccine (2 - Booster for Pancho series) 09/12/2021    Flu vaccine (Season Ended) 08/01/2023    Depression Screen  02/13/2024    Cervical cancer screen  10/13/2026    Lipids  02/14/2027    DTaP/Tdap/Td vaccine (2 - Td or

## 2023-07-06 ENCOUNTER — OFFICE VISIT (OUTPATIENT)
Dept: OBGYN CLINIC | Age: 43
End: 2023-07-06

## 2023-07-06 VITALS
HEIGHT: 67 IN | BODY MASS INDEX: 27.78 KG/M2 | WEIGHT: 177 LBS | DIASTOLIC BLOOD PRESSURE: 80 MMHG | SYSTOLIC BLOOD PRESSURE: 122 MMHG

## 2023-07-06 DIAGNOSIS — E28.39 PRIMARY OVARIAN INSUFFICIENCY: Primary | ICD-10-CM

## 2023-07-06 DIAGNOSIS — R68.82 LOW LIBIDO: ICD-10-CM

## 2023-07-06 DIAGNOSIS — N39.3 STRESS INCONTINENCE IN FEMALE: ICD-10-CM

## 2023-07-06 DIAGNOSIS — M62.89 PELVIC FLOOR DYSFUNCTION: ICD-10-CM

## 2023-07-10 RX ORDER — FLUTICASONE PROPIONATE 50 MCG
SPRAY, SUSPENSION (ML) NASAL
Qty: 16 G | Refills: 0 | OUTPATIENT
Start: 2023-07-10

## 2023-07-10 RX ORDER — FLUTICASONE PROPIONATE 50 MCG
SPRAY, SUSPENSION (ML) NASAL
Qty: 16 G | Refills: 0 | Status: SHIPPED | OUTPATIENT
Start: 2023-07-10

## 2023-07-10 RX ORDER — FLUTICASONE PROPIONATE 50 MCG
SPRAY, SUSPENSION (ML) NASAL
Qty: 16 G | Refills: 0 | Status: SHIPPED | OUTPATIENT
Start: 2023-07-10 | End: 2023-07-10

## 2023-08-03 ENCOUNTER — OFFICE VISIT (OUTPATIENT)
Age: 43
End: 2023-08-03
Payer: COMMERCIAL

## 2023-08-03 VITALS — BODY MASS INDEX: 27.78 KG/M2 | HEIGHT: 67 IN | WEIGHT: 177 LBS

## 2023-08-03 DIAGNOSIS — N39.0 RECURRENT UTI: ICD-10-CM

## 2023-08-03 DIAGNOSIS — R35.0 URINARY FREQUENCY: ICD-10-CM

## 2023-08-03 DIAGNOSIS — N39.41 URGE INCONTINENCE: Primary | ICD-10-CM

## 2023-08-03 LAB
BILIRUBIN, POC: NORMAL
BLOOD URINE, POC: NORMAL
CLARITY, POC: CLEAR
COLOR, POC: YELLOW
GLUCOSE URINE, POC: NORMAL
KETONES, POC: NORMAL
LEUKOCYTE EST, POC: NORMAL
NITRITE, POC: NORMAL
PH, POC: NORMAL
PROTEIN, POC: NORMAL
SPECIFIC GRAVITY, POC: NORMAL
UROBILINOGEN, POC: NORMAL

## 2023-08-03 PROCEDURE — 99214 OFFICE O/P EST MOD 30 MIN: CPT | Performed by: SPECIALIST

## 2023-08-03 PROCEDURE — 81003 URINALYSIS AUTO W/O SCOPE: CPT | Performed by: SPECIALIST

## 2023-08-03 NOTE — PROGRESS NOTES
[facility]ayo [facility] [address] Urology Office Progress Note    Patient:  Rhea Ford  YOB: 1980  Date: 8/3/2023    HISTORY OF PRESENT ILLNESS:   The patient is a 44 y.o. female  Patient has persistent, bothersome urgency and frequency and Urge urinary incontinence requiring 2 pads per day. Discussed the following options: increase Solifenacin (Vesicare) 10 mg po qd, Cystoscopy and transvesical Botox (100 IU), and the Interstim sacral neuromodulation device. She would like to proceed with Cystoscopy and transvesical Botox (100 IU) under MAC. Her Salmonella UTI noted 5/1/23 cleared with Rx with Bactrim (5/15/23 C&S neg).     Discussed the risk of Botox including lack of efficacy, UTI, incomplete bladder emptying   Lower urinary tract symptoms: urgency, frequency, hesitancy, decreased urinary stream, and nocturia, 1 times per night   Last AUA Symptom Score (QOL): 15 (5)  Today's AUA Symptom Score (QOL): 16 (5)    Summary of old records:   Frequency every 2 hours with occasional urgency, Urge urinary incontinence: 2 pops/d, tried trospium, ditropan and detrol wo success; 10/21/20 PVR = 0 mL; Myrbetriq 50 mg po qd, solifenacin (Vesicare) 5 mg qd (discussed trying 10 mg qd 2/24/21); switch to Gemtesa (vibegron) 75 mg qd 5/4/23 11/24/20 VD: KIZ=908 mL, ILI=3323 mL/d, TV=8.33 voids/d, WLW=930 mL/void, Nx0, NPi=26%, NUP=40 mL/hr  MALI: referred to PT 5/4/23  3/9/20 CT neg  Back surgery for sciatica, L5-S1 foraminotomy 11/9/20 ([facility])  Recurrent UTI\"s: 8/8/22 Serratia; 4/18/23, 5/1/23 Salmonella-Bactrim bid x 10 day then qd x 1 month    Additional History: none    Procedures Today: N/A    Urinalysis today:  Results for POC orders placed in visit on 08/03/23   POCT Urinalysis No Micro (Auto)   Result Value Ref Range    Color, UA yellow     Clarity, UA clear     Glucose, UA POC neg     Bilirubin, UA      Ketones, UA      Spec Grav, UA      Blood, UA POC neg     pH, UA      Protein,

## 2023-08-11 ENCOUNTER — PATIENT MESSAGE (OUTPATIENT)
Dept: FAMILY MEDICINE CLINIC | Age: 43
End: 2023-08-11

## 2023-08-11 DIAGNOSIS — G89.29 CHRONIC MIDLINE LOW BACK PAIN WITHOUT SCIATICA: Primary | ICD-10-CM

## 2023-08-11 DIAGNOSIS — M54.50 CHRONIC MIDLINE LOW BACK PAIN WITHOUT SCIATICA: Primary | ICD-10-CM

## 2023-08-11 DIAGNOSIS — D3A.8 NEUROENDOCRINE TUMOR: ICD-10-CM

## 2023-08-11 DIAGNOSIS — M51.36 DEGENERATIVE DISC DISEASE, LUMBAR: ICD-10-CM

## 2023-08-11 DIAGNOSIS — F41.9 ANXIETY: Primary | ICD-10-CM

## 2023-08-11 DIAGNOSIS — M54.32 SCIATICA OF LEFT SIDE: ICD-10-CM

## 2023-08-12 NOTE — TELEPHONE ENCOUNTER
From: Lyndon Almanza  To: Love Thomas  Sent: 8/11/2023 3:44 PM EDT  Subject: Clonazepam     Hello! Dr Adal Vogel was prescribing me my .5 mg clonazepam 4 times a day for my leg cramps, but I received a letter that his office is closing. I tried calling my refill into [facility], but since his office is closed they said they sent the refill to your office. I was hoping you would be ok with taking the prescription over since I have no other reason to see a neurologist and I have several appointments a year with you. Thank you!     Dmitry Yeager

## 2023-08-13 RX ORDER — CLONAZEPAM 0.5 MG/1
TABLET ORAL
Qty: 120 TABLET | Refills: 0 | Status: SHIPPED | OUTPATIENT
Start: 2023-08-13 | End: 2023-09-13

## 2023-08-15 ENCOUNTER — TELEPHONE (OUTPATIENT)
Dept: FAMILY MEDICINE CLINIC | Age: 43
End: 2023-08-15

## 2023-08-15 ENCOUNTER — HOSPITAL ENCOUNTER (OUTPATIENT)
Age: 43
Setting detail: OUTPATIENT SURGERY
Discharge: HOME | End: 2023-08-15
Attending: SPECIALIST | Admitting: SPECIALIST
Payer: COMMERCIAL

## 2023-08-15 ENCOUNTER — ANESTHESIA (OUTPATIENT)
Dept: OPERATING ROOM | Age: 43
End: 2023-08-15
Payer: COMMERCIAL

## 2023-08-15 ENCOUNTER — ANESTHESIA EVENT (OUTPATIENT)
Dept: OPERATING ROOM | Age: 43
End: 2023-08-15
Payer: COMMERCIAL

## 2023-08-15 ENCOUNTER — TELEPHONE (OUTPATIENT)
Age: 43
End: 2023-08-15

## 2023-08-15 VITALS
HEART RATE: 50 BPM | HEIGHT: 67 IN | SYSTOLIC BLOOD PRESSURE: 98 MMHG | TEMPERATURE: 97.6 F | RESPIRATION RATE: 15 BRPM | DIASTOLIC BLOOD PRESSURE: 62 MMHG | OXYGEN SATURATION: 100 % | BODY MASS INDEX: 27 KG/M2 | WEIGHT: 172 LBS

## 2023-08-15 DIAGNOSIS — M54.32 SCIATICA OF LEFT SIDE: Primary | ICD-10-CM

## 2023-08-15 LAB — HCG, PREGNANCY URINE (POC): NEGATIVE

## 2023-08-15 PROCEDURE — 3700000001 HC ADD 15 MINUTES (ANESTHESIA): Performed by: SPECIALIST

## 2023-08-15 PROCEDURE — 3600000012 HC SURGERY LEVEL 2 ADDTL 15MIN: Performed by: SPECIALIST

## 2023-08-15 PROCEDURE — 3600000002 HC SURGERY LEVEL 2 BASE: Performed by: SPECIALIST

## 2023-08-15 PROCEDURE — 7100000010 HC PHASE II RECOVERY - FIRST 15 MIN: Performed by: SPECIALIST

## 2023-08-15 PROCEDURE — 6360000002 HC RX W HCPCS: Performed by: SPECIALIST

## 2023-08-15 PROCEDURE — 7100000011 HC PHASE II RECOVERY - ADDTL 15 MIN: Performed by: SPECIALIST

## 2023-08-15 PROCEDURE — 6360000002 HC RX W HCPCS

## 2023-08-15 PROCEDURE — 2580000003 HC RX 258

## 2023-08-15 PROCEDURE — 3700000000 HC ANESTHESIA ATTENDED CARE: Performed by: SPECIALIST

## 2023-08-15 PROCEDURE — 2709999900 HC NON-CHARGEABLE SUPPLY: Performed by: SPECIALIST

## 2023-08-15 PROCEDURE — 52287 CYSTOSCOPY CHEMODENERVATION: CPT | Performed by: SPECIALIST

## 2023-08-15 PROCEDURE — 81025 URINE PREGNANCY TEST: CPT

## 2023-08-15 PROCEDURE — 2500000003 HC RX 250 WO HCPCS

## 2023-08-15 PROCEDURE — 2580000003 HC RX 258: Performed by: SPECIALIST

## 2023-08-15 RX ORDER — MAGNESIUM HYDROXIDE 1200 MG/15ML
LIQUID ORAL PRN
Status: DISCONTINUED | OUTPATIENT
Start: 2023-08-15 | End: 2023-08-15 | Stop reason: HOSPADM

## 2023-08-15 RX ORDER — SODIUM CHLORIDE, SODIUM LACTATE, POTASSIUM CHLORIDE, CALCIUM CHLORIDE 600; 310; 30; 20 MG/100ML; MG/100ML; MG/100ML; MG/100ML
INJECTION, SOLUTION INTRAVENOUS CONTINUOUS PRN
Status: DISCONTINUED | OUTPATIENT
Start: 2023-08-15 | End: 2023-08-15 | Stop reason: SDUPTHER

## 2023-08-15 RX ORDER — NITROFURANTOIN 25; 75 MG/1; MG/1
100 CAPSULE ORAL 2 TIMES DAILY
Qty: 14 CAPSULE | Refills: 0 | Status: SHIPPED | OUTPATIENT
Start: 2023-08-15

## 2023-08-15 RX ORDER — LIDOCAINE HYDROCHLORIDE 10 MG/ML
INJECTION, SOLUTION EPIDURAL; INFILTRATION; INTRACAUDAL; PERINEURAL PRN
Status: DISCONTINUED | OUTPATIENT
Start: 2023-08-15 | End: 2023-08-15 | Stop reason: SDUPTHER

## 2023-08-15 RX ORDER — SODIUM CHLORIDE 0.9 % (FLUSH) 0.9 %
SYRINGE (ML) INJECTION PRN
Status: DISCONTINUED | OUTPATIENT
Start: 2023-08-15 | End: 2023-08-15 | Stop reason: HOSPADM

## 2023-08-15 RX ORDER — PROPOFOL 10 MG/ML
INJECTION, EMULSION INTRAVENOUS CONTINUOUS PRN
Status: DISCONTINUED | OUTPATIENT
Start: 2023-08-15 | End: 2023-08-15 | Stop reason: SDUPTHER

## 2023-08-15 RX ORDER — MIDAZOLAM HYDROCHLORIDE 1 MG/ML
INJECTION INTRAMUSCULAR; INTRAVENOUS PRN
Status: DISCONTINUED | OUTPATIENT
Start: 2023-08-15 | End: 2023-08-15 | Stop reason: SDUPTHER

## 2023-08-15 RX ADMIN — LIDOCAINE HYDROCHLORIDE 50 MG: 10 INJECTION, SOLUTION EPIDURAL; INFILTRATION; INTRACAUDAL; PERINEURAL at 10:29

## 2023-08-15 RX ADMIN — Medication 2000 MG: at 10:35

## 2023-08-15 RX ADMIN — MIDAZOLAM 2 MG: 1 INJECTION INTRAMUSCULAR; INTRAVENOUS at 10:25

## 2023-08-15 RX ADMIN — SODIUM CHLORIDE, POTASSIUM CHLORIDE, SODIUM LACTATE AND CALCIUM CHLORIDE: 600; 310; 30; 20 INJECTION, SOLUTION INTRAVENOUS at 10:23

## 2023-08-15 RX ADMIN — PROPOFOL 150 MCG/KG/MIN: 10 INJECTION, EMULSION INTRAVENOUS at 10:29

## 2023-08-15 ASSESSMENT — PAIN - FUNCTIONAL ASSESSMENT: PAIN_FUNCTIONAL_ASSESSMENT: 0-10

## 2023-08-15 NOTE — ANESTHESIA POSTPROCEDURE EVALUATION
Department of Anesthesiology  Postprocedure Note    Patient: Nikhil Richey  MRN: [id number]  YOB: 1980  Date of evaluation: 8/15/2023      Procedure Summary     Date: 08/15/23 Room / Location: [address]    Anesthesia Start: 1024 Anesthesia Stop: 4747    Procedure: CYSTOSCOPY BOTOX INJECTION  (100 UNITS) Diagnosis:       Urge incontinence      (Urge incontinence [N39.41])    Surgeons: Milan Govea MD Responsible Provider: Governor Sanket MD    Anesthesia Type: MAC ASA Status: 3          Anesthesia Type: No value filed.     Reva Phase I:      Reva Phase II: Reva Score: 10      Anesthesia Post Evaluation    Patient location during evaluation: PACU  Patient participation: complete - patient participated  Level of consciousness: awake  Pain score: 0  Cardiovascular status: hemodynamically stable  Respiratory status: room air

## 2023-08-15 NOTE — DISCHARGE INSTRUCTIONS
CYSTOSCOPY    You may notice some blood in the urine, as well as some burning with urination and urgency/frequency. This may last for several days after a cystoscopy and is expected after this procedure. Pt ok to discharge home in good condition  Pt should avoid strenuous activity   Pt should walk/ambulate at home  Pt may continue regular diet   Please call attending physician or hospital  with questions  Call or Present to ED if fever (> 101F), intractable nausea vomiting or pain. If you were prescribed any medications, they will be sent to the pharmacy in your chart     Pt should follow up with Urology, in 3 weeks, or sooner if there are any issues, call to confirm appointment     No alcoholic beverages, no driving or operating machinery, no making important decisions for 24 hours. You may have a normal diet but should eat lightly day of surgery. Drink plenty of fluids.   Urinate within 8 hours after surgery, if unable to urinate call your doctor    [facility] APPT WITH DR. Logan Paulino IN 3 WEEKS

## 2023-08-15 NOTE — TELEPHONE ENCOUNTER
Patient states need a new referral for Neuro. patient was previously seeing dr Laren Phalen. Patient would like to see Dr. Vale Hook. [facility]. Leg cramps and sciatic.    Patient also asking if it takes longer to get into their office is pcp ok to refill clonazepam.     Phone: [phone]    Fax: [phone]

## 2023-08-15 NOTE — H&P
[facility] Urology  Whit Spaulding. Renée Lanier MD FACS    History and Physical    Patient:  Hetal Johnston  MRN: [id number]  YOB: 1980    CHIEF COMPLAINT:  Urge incontinence    HISTORY OF PRESENT ILLNESS:   The patient is a 44 y.o. female who presents with UUI, frequency, requires 2 pads daily. Symptoms refractory to medical therapy. Patient's old records, notes and chart reviewed and summarized above. Past Medical History:    Past Medical History:   Diagnosis Date    ADD (attention deficit disorder)     Anxiety     Back pain     Body piercing     several,instructed to remove. will remove tongue/nose    Carpal tunnel syndrome, bilateral 05/19/2016    Chronic back pain     started pain mgmnt 2/218    Depression     Foot pain     left (d/t back)    History of cervical cancer age 17    History of colon polyps     x 1 precancerous polyp    History of gastroesophageal reflux (GERD)     \"mild\"  no medications    History of miscarriage     x 2    Hx of blood clots     right leg superficial blood clot    Hyperlipidemia     watching diet    Lumbago     Neuroendocrine tumor     OAB (overactive bladder) 07/12/2017    Obesity (BMI 30-39. 9)     PUD (peptic ulcer disease)     Sciatica     Under care of team     pcp ALLIE mccarthy    Under care of team     psychiatrist dr Lucien Morales every 3 months    Urge incontinence        Past Surgical History:    Past Surgical History:   Procedure Laterality Date    BACK SURGERY  09/19/2016    Lumbar interbody fusion posterior, L5-S1, had 5 surgeries total    COLONOSCOPY  05/18/2020    DILATION AND CURETTAGE OF UTERUS      EYE SURGERY      R    LEEP  age 17    OTHER SURGICAL HISTORY  03/26/2018    myelogram    ID ARTHRODESIS POSTERIOR INTERBODY 1 NTRSPC LUMBAR N/A 06/25/2018    LUMBAR INTERBODY FUSION POSTERIOR L45 performed by Betty Zamora MD at [facility] W/O FFD > 2 VERT SEG LUMBAR N/A 04/23/2018    LUMBAR MICRO DISKECTOMY   L45 performed by

## 2023-08-15 NOTE — TELEPHONE ENCOUNTER
Pt just had cysto Botox injection with you and is wondering if she should of had an antibiotic her pharmacy is [id number] - [address], [facility],[address] [facility] [phone]

## 2023-08-15 NOTE — ANESTHESIA PRE PROCEDURE
Department of Anesthesiology  Preprocedure Note       Name:  Cayden Linares   Age:  44 y.o.  :  1980                                          MRN:  [id number]         Date:  8/15/2023      Surgeon: Elia Garcia):  Shahbaz Rueda MD    Procedure: Procedure(s):  **CASE IN OR. WITH G.I. STAFF**  EGD ESOPHAGOGASTRODUODENOSCOPY    Medications prior to admission:   Prior to Admission medications    Medication Sig Start Date End Date Taking? Authorizing Provider   clonazePAM (KLONOPIN) 0.5 MG tablet TAKE 1 TABLET BY MOUTH IN THE MORNING, 1 TAB IN THE AFTERNOON, AND 2 TABS AT BEDTIME DAILY AS NEEDED 23  Renee Valdez PA-C   fluticasone (FLONASE) 50 MCG/ACT nasal spray INSTILL 2 SPRAYS IN EACH NOSTRIL TWO TIMES A DAY. 7/10/23   Renee Valdez PA-C   amphetamine-dextroamphetamine (ADDERALL) 20 MG tablet Take 1 tablet by mouth 2 times daily. 23   Historical Provider, MD   solifenacin (VESICARE) 10 MG tablet TAKE 1 TABLET DAILY 23   Shahbaz Rueda MD   Semaglutide-Weight Management [facility]) 1 MG/0.5ML SOAJ SC injection Inject 1 mg into the skin every 7 days 23   Ilda Valdez PA-C   venlafaxine (EFFEXOR XR) 150 MG extended release capsule Take by mouth daily 23   Historical Provider, MD   vibegron (GEMTESA) 75 MG TABS tablet Take 1 tablet by mouth daily 23   Shahbaz Rueda MD   ibuprofen (ADVIL;MOTRIN) 800 MG tablet TAKE 1 TABLET BY MOUTH EVERY SIX HOURS WITH FOOD OR MILK AS NEEDED FOR PAIN.  23   Renee Valdez PA-C   Calcium Carb-Cholecalciferol (CALCIUM 500 + D3 PO) Take by mouth    Historical Provider, MD   BIOTIN PO Take by mouth    Historical Provider, MD   vitamin B-12 (CYANOCOBALAMIN) 1000 MCG tablet Take 1 tablet by mouth daily    Historical Provider, MD   Multiple Vitamins-Minerals (THERAPEUTIC MULTIVITAMIN-MINERALS) tablet Take 1 tablet by mouth daily    Historical Provider, MD   Lactobacillus (PROBIOTIC ACIDOPHILUS PO) Take 1 tablet by mouth daily

## 2023-08-20 RX ORDER — SEMAGLUTIDE 1 MG/.5ML
INJECTION, SOLUTION SUBCUTANEOUS
Qty: 6 ML | Refills: 0 | Status: SHIPPED | OUTPATIENT
Start: 2023-08-20

## 2023-09-07 ENCOUNTER — OFFICE VISIT (OUTPATIENT)
Age: 43
End: 2023-09-07
Payer: COMMERCIAL

## 2023-09-07 VITALS — WEIGHT: 172 LBS | BODY MASS INDEX: 27 KG/M2 | HEIGHT: 67 IN

## 2023-09-07 DIAGNOSIS — R35.0 URINARY FREQUENCY: ICD-10-CM

## 2023-09-07 DIAGNOSIS — N39.41 URGE INCONTINENCE: Primary | ICD-10-CM

## 2023-09-07 DIAGNOSIS — N39.0 RECURRENT UTI: ICD-10-CM

## 2023-09-07 LAB
BILIRUBIN, POC: NORMAL
BLOOD URINE, POC: NORMAL
CLARITY, POC: CLEAR
COLOR, POC: YELLOW
GLUCOSE URINE, POC: NORMAL
KETONES, POC: NORMAL
LEUKOCYTE EST, POC: NORMAL
NITRITE, POC: NORMAL
PH, POC: NORMAL
POST VOID RESIDUAL (PVR): 1 ML
PROTEIN, POC: NORMAL
SPECIFIC GRAVITY, POC: NORMAL
UROBILINOGEN, POC: NORMAL

## 2023-09-07 PROCEDURE — G8427 DOCREV CUR MEDS BY ELIG CLIN: HCPCS | Performed by: SPECIALIST

## 2023-09-07 PROCEDURE — G8417 CALC BMI ABV UP PARAM F/U: HCPCS | Performed by: SPECIALIST

## 2023-09-07 PROCEDURE — 99213 OFFICE O/P EST LOW 20 MIN: CPT | Performed by: SPECIALIST

## 2023-09-07 PROCEDURE — 81003 URINALYSIS AUTO W/O SCOPE: CPT | Performed by: SPECIALIST

## 2023-09-07 PROCEDURE — 1036F TOBACCO NON-USER: CPT | Performed by: SPECIALIST

## 2023-09-07 PROCEDURE — 51798 US URINE CAPACITY MEASURE: CPT | Performed by: SPECIALIST

## 2023-09-07 NOTE — PROGRESS NOTES
Kriss [facility] [address] Urology Office Progress Note    Patient:  Jennifer Zhong  YOB: 1980  Date: 9/7/2023    HISTORY OF PRESENT ILLNESS:   The patient is a 44 y.o. female  Patient doing well after 8/15/23 Cystoscopy and transvesical Botox (100 IU) Rx. She is now using 0 pads per day. Her Postvoid Residual today was 1 mL. Will plan to repeat a Cystoscopy and transvesical Botox (100 IU) in mid February 2024 to maintain the clinical effect. She was told to discontinue Solifenacin (Vesicare) 10 mg po qd for OAB symptoms. If she does not have any deterioration of her bladder control, she will discontinue Gemtesa (vibegron) 75 mg po qd for OAB. Lower urinary tract symptoms: urgency, frequency, hesitancy, decreased urinary stream, nocturia, 1 times per night, and incomplete emptying and straining.    Last AUA Symptom Score (QOL): 16 (5)  Today's AUA Symptom Score (QOL): 14 (2)    Summary of old records:   Frequency every 2 hours with occasional urgency, Urge urinary incontinence: 2 pops/d, tried trospium, ditropan and detrol wo success; 10/21/20 PVR = 0 mL; Myrbetriq 50 mg po qd, solifenacin (Vesicare) 5 mg qd (discussed trying 10 mg qd 2/24/21); switch to Gemtesa (vibegron) 75 mg qd 5/4/23 11/24/20 VD: HXG=265 mL, UJF=3322 mL/d, TV=8.33 voids/d, LQY=796 mL/void, Nx0, NPi=26%, NUP=40 mL/hr  MALI: referred to PT 5/4/23  3/9/20 CT neg  Back surgery for sciatica, L5-S1 foraminotomy 11/9/20 ([facility])  Recurrent UTI\"s: 8/8/22 Serratia; 4/18/23, 5/1/23 Salmonella-Bactrim bid x 10 day then qd x 1 month    Additional History: none    Procedures Today: Postvoid Residual:  Post-void Residual by bladder scanner: 1 mL (9/7/23)     Urinalysis today:  Results for POC orders placed in visit on 09/07/23   POCT Urinalysis No Micro (Auto)   Result Value Ref Range    Color, UA yellow     Clarity, UA clear     Glucose, UA POC neg     Bilirubin, UA      Ketones, UA      Spec Grav, UA

## 2023-09-15 RX ORDER — FLUTICASONE PROPIONATE 50 MCG
SPRAY, SUSPENSION (ML) NASAL
Qty: 16 G | Refills: 0 | Status: SHIPPED | OUTPATIENT
Start: 2023-09-15

## 2023-09-15 RX ORDER — IBUPROFEN 800 MG/1
TABLET ORAL
Qty: 30 TABLET | Refills: 0 | Status: SHIPPED | OUTPATIENT
Start: 2023-09-15 | End: 2023-09-17

## 2023-09-15 RX ORDER — IBUPROFEN 800 MG/1
TABLET ORAL
Qty: 30 TABLET | Refills: 0 | OUTPATIENT
Start: 2023-09-15

## 2023-09-17 RX ORDER — IBUPROFEN 800 MG/1
TABLET ORAL
Qty: 30 TABLET | Refills: 0 | Status: SHIPPED | OUTPATIENT
Start: 2023-09-17

## 2023-09-19 ENCOUNTER — TELEPHONE (OUTPATIENT)
Age: 43
End: 2023-09-19

## 2023-09-19 DIAGNOSIS — N30.00 ACUTE CYSTITIS WITHOUT HEMATURIA: Primary | ICD-10-CM

## 2023-09-19 RX ORDER — SULFAMETHOXAZOLE AND TRIMETHOPRIM 800; 160 MG/1; MG/1
1 TABLET ORAL 2 TIMES DAILY
Qty: 14 TABLET | Refills: 0 | Status: SHIPPED | OUTPATIENT
Start: 2023-09-19 | End: 2023-09-21

## 2023-09-19 RX ORDER — PHENAZOPYRIDINE HYDROCHLORIDE 200 MG/1
200 TABLET, FILM COATED ORAL 3 TIMES DAILY PRN
Qty: 9 TABLET | Refills: 0 | Status: SHIPPED | OUTPATIENT
Start: 2023-09-19 | End: 2023-09-21

## 2023-09-19 NOTE — TELEPHONE ENCOUNTER
----- Message from Nav Lindo MD sent at 9/19/2023  9:29 AM EDT -----  Regarding: FW: Poss UTI  Contact: [phone]  Put standing order in for urine for C&S. Get urine for C&S first then see eRx for Bactrim and Pyridium. Augustoshane Galvan M.D.      ----- Message -----  From: Nat Candelaria, [address]  Sent: 9/19/2023   7:45 AM EDT  To: Nav Lindo MD  Subject: Rajwinder Rumble: Poss UTI                                       ----- Message -----  From: Cristofer Ojeda  Sent: 9/18/2023  12:56 AM EDT  [facility] Clinical Staff  Subject: Poss UTI                                         Hello. I am pretty sure I have another uti. Symptoms started Saturday around dinner time. Pain/burning with urination, not feeling like bladder is empty. I started taking azo later that evening and took 2 doses Sunday, along with drinking Cranberry juice. If I need to submit a urine sample, I prefer the [facility] lab in [facility] please. Thank you.

## 2023-09-20 ENCOUNTER — HOSPITAL ENCOUNTER (OUTPATIENT)
Age: 43
Setting detail: SPECIMEN
Discharge: HOME | End: 2023-09-20

## 2023-09-20 DIAGNOSIS — N30.00 ACUTE CYSTITIS WITHOUT HEMATURIA: ICD-10-CM

## 2023-09-21 ENCOUNTER — OFFICE VISIT (OUTPATIENT)
Dept: FAMILY MEDICINE CLINIC | Age: 43
End: 2023-09-21
Payer: COMMERCIAL

## 2023-09-21 VITALS
SYSTOLIC BLOOD PRESSURE: 118 MMHG | WEIGHT: 171 LBS | BODY MASS INDEX: 26.84 KG/M2 | HEART RATE: 76 BPM | TEMPERATURE: 97.4 F | OXYGEN SATURATION: 97 % | HEIGHT: 67 IN | DIASTOLIC BLOOD PRESSURE: 62 MMHG

## 2023-09-21 DIAGNOSIS — E66.3 OVERWEIGHT: Primary | ICD-10-CM

## 2023-09-21 DIAGNOSIS — F52.31 INHIBITED FEMALE ORGASM: ICD-10-CM

## 2023-09-21 PROCEDURE — 1036F TOBACCO NON-USER: CPT | Performed by: PHYSICIAN ASSISTANT

## 2023-09-21 PROCEDURE — 99213 OFFICE O/P EST LOW 20 MIN: CPT | Performed by: PHYSICIAN ASSISTANT

## 2023-09-21 PROCEDURE — G8427 DOCREV CUR MEDS BY ELIG CLIN: HCPCS | Performed by: PHYSICIAN ASSISTANT

## 2023-09-21 PROCEDURE — G8417 CALC BMI ABV UP PARAM F/U: HCPCS | Performed by: PHYSICIAN ASSISTANT

## 2023-09-21 RX ORDER — SULFAMETHOXAZOLE AND TRIMETHOPRIM 800; 160 MG/1; MG/1
1 TABLET ORAL 2 TIMES DAILY
COMMUNITY

## 2023-09-21 ASSESSMENT — ENCOUNTER SYMPTOMS
COLOR CHANGE: 0
COUGH: 0

## 2023-09-21 NOTE — PROGRESS NOTES
Visit Information    Have you changed or started any medications since your last visit including any over-the-counter medicines, vitamins, or herbal medicines? no   Are you having any side effects from any of your medications? -  no  Have you stopped taking any of your medications? Is so, why? -  no    Have you seen any other physician or provider since your last visit? No  Have you had any other diagnostic tests since your last visit? No  Have you been seen in the emergency room and/or had an admission to a hospital since we last saw you? No  Have you had your routine dental cleaning in the past 6 months? no    Have you activated your [employer] account? If not, what are your barriers?  Yes     Patient Care Team:  Twila Mclain PA-C as PCP - General (Family Medicine)  Twila Mclain PA-C as PCP - Empaneled Provider  Adelita Raygoza MD as Consulting Physician (Urology)    Medical History Review  Past Medical, Family, and Social History reviewed and  contribute to the patient presenting condition    Health Maintenance   Topic Date Due    Hepatitis B vaccine (1 of 3 - 3-dose series) Never done    Varicella vaccine (1 of 2 - 2-dose childhood series) Never done    Hepatitis C screen  Never done    COVID-19 Vaccine (2 - Booster for Pancho series) 09/12/2021    Flu vaccine (1) Never done    Depression Screen  02/13/2024    Cervical cancer screen  10/13/2026    Lipids  02/14/2027    DTaP/Tdap/Td vaccine (2 - Td or Tdap) 08/28/2028    HIV screen  Completed    Hepatitis A vaccine  Aged Out    Hib vaccine  Aged Out    HPV vaccine  Aged Out    Meningococcal (ACWY) vaccine  Aged Out    Pneumococcal 0-64 years Vaccine  Aged Out    Diabetes screen  Discontinued

## 2023-09-21 NOTE — PROGRESS NOTES
[facility]  [address]  [address] [address]  Dept: [phone]  Dept Fax: [phone]    Adrianna Bonilla is a 44 y.o. female who presents today for her medical conditions/complaintsas noted below. Adrianna Bonilla is c/o of   Chief Complaint   Patient presents with    Weight Management         HPI:     HPI    Patient is here for recheck on weight loss. She reports she has been feeling well. Presently on strike for work and has been enjoying some time off. She reports tolerating the wegovy well. No refills needed at this time. Weight continues to drop. Presently at 171 with a BMI of 26.7  Patient does report she has had issues with orgasm since she tapered off effexor. Patient states some sexual acts help this but not all. Still working with her  on this. She is in a good supportive relationship  Patient states she is presently on bactrim from urology for a UTI    No results found for: \"LABA1C\"          ( goal A1Cis < 7)   No components found for: \"LABMICR\"  LDL Cholesterol (mg/dL)   Date Value   02/14/2022 126   01/29/2019 100   05/18/2018 145 (H)       (goal LDL is <100)   AST (U/L)   Date Value   02/14/2022 20     ALT (U/L)   Date Value   02/14/2022 18     BUN (mg/dL)   Date Value   02/14/2022 14     BP Readings from Last 3 Encounters:   09/21/23 118/62   08/15/23 98/62   07/06/23 122/80          (goal 120/80)    Past Medical History:   Diagnosis Date    ADD (attention deficit disorder)     Allergic rhinitis     Anxiety     Back pain     Body piercing     several,instructed to remove. will remove tongue/nose    Cancer (HCC) Cervical cancer, neuroendocrine tumor 2020    Carpal tunnel syndrome, bilateral 05/19/2016    Chronic back pain     started pain mgmnt 2/218    Depression     Foot pain     left (d/t back)    History of cervical cancer age 17    History of colon polyps     x 1 precancerous polyp    History of gastroesophageal reflux

## 2023-09-22 LAB
MICROORGANISM SPEC CULT: ABNORMAL
SPECIMEN DESCRIPTION: ABNORMAL

## 2023-09-22 RX ORDER — NITROFURANTOIN 25; 75 MG/1; MG/1
100 CAPSULE ORAL 2 TIMES DAILY
Qty: 14 CAPSULE | Refills: 0 | Status: SHIPPED | OUTPATIENT
Start: 2023-09-22

## 2023-10-04 ENCOUNTER — HOSPITAL ENCOUNTER (OUTPATIENT)
Age: 43
Setting detail: SPECIMEN
Discharge: HOME | End: 2023-10-04

## 2023-10-06 LAB
MICROORGANISM SPEC CULT: ABNORMAL
SPECIMEN DESCRIPTION: ABNORMAL

## 2023-10-18 ENCOUNTER — PATIENT MESSAGE (OUTPATIENT)
Dept: FAMILY MEDICINE CLINIC | Age: 43
End: 2023-10-18

## 2023-10-18 DIAGNOSIS — G89.29 CHRONIC MIDLINE LOW BACK PAIN WITHOUT SCIATICA: Primary | ICD-10-CM

## 2023-10-18 DIAGNOSIS — M54.50 CHRONIC MIDLINE LOW BACK PAIN WITHOUT SCIATICA: Primary | ICD-10-CM

## 2023-10-18 DIAGNOSIS — R25.2 LEG CRAMPS: ICD-10-CM

## 2023-10-18 NOTE — TELEPHONE ENCOUNTER
From: Belinda Stacy  To: Twila Mclain  Sent: 10/18/2023 12:16 AM EDT  Subject: Physical therapy     Hello. I was hoping you wouldn't mind sending a referral to [facility] on [address] [facility] for me. Since being off work and not as active, I've noticed I'm getting more leg cramps and the dry needling and stretching they do on me really helps. Thank you!

## 2023-10-20 ENCOUNTER — HOSPITAL ENCOUNTER (OUTPATIENT)
Age: 43
Setting detail: SPECIMEN
Discharge: HOME | End: 2023-10-20

## 2023-10-22 LAB
MICROORGANISM SPEC CULT: ABNORMAL
MICROORGANISM SPEC CULT: ABNORMAL
SPECIMEN DESCRIPTION: ABNORMAL

## 2023-10-23 LAB
MICROORGANISM SPEC CULT: ABNORMAL
MICROORGANISM SPEC CULT: ABNORMAL
SPECIMEN DESCRIPTION: ABNORMAL

## 2023-10-23 RX ORDER — LEVOFLOXACIN 500 MG/1
500 TABLET, FILM COATED ORAL DAILY
Qty: 7 TABLET | Refills: 0 | Status: SHIPPED | OUTPATIENT
Start: 2023-10-23

## 2023-11-07 ENCOUNTER — HOSPITAL ENCOUNTER (OUTPATIENT)
Age: 43
Setting detail: SPECIMEN
Discharge: HOME | End: 2023-11-07

## 2023-11-08 ENCOUNTER — TELEPHONE (OUTPATIENT)
Age: 43
End: 2023-11-08

## 2023-11-08 LAB
MICROORGANISM SPEC CULT: NORMAL
SPECIMEN DESCRIPTION: NORMAL

## 2023-11-08 NOTE — TELEPHONE ENCOUNTER
Patient called, stated her botox from 8/15/23 seem to be wearing off. When can she schedule another one?   Please advise

## 2023-11-12 RX ORDER — SEMAGLUTIDE 1 MG/.5ML
INJECTION, SOLUTION SUBCUTANEOUS
Qty: 6 ML | Refills: 0 | Status: SHIPPED | OUTPATIENT
Start: 2023-11-12

## 2023-11-13 RX ORDER — BACLOFEN 10 MG/1
10 TABLET ORAL NIGHTLY
COMMUNITY

## 2023-11-14 ENCOUNTER — TELEPHONE (OUTPATIENT)
Dept: NEUROLOGY | Age: 43
End: 2023-11-14

## 2023-11-14 NOTE — TELEPHONE ENCOUNTER
11 14 2023 I called the patient times 2 (11 06 2023 and 11 14 2023 at  [phone]) to schedule new patient appointment with one of our providers, FENG both times, no response. I mailed the patient a letter asking them to call the office back to schedule this appointment.   KS

## 2023-11-16 ENCOUNTER — ANESTHESIA EVENT (OUTPATIENT)
Dept: OPERATING ROOM | Age: 43
End: 2023-11-16
Payer: COMMERCIAL

## 2023-11-17 ENCOUNTER — ANESTHESIA (OUTPATIENT)
Dept: OPERATING ROOM | Age: 43
End: 2023-11-17
Payer: COMMERCIAL

## 2023-11-17 ENCOUNTER — HOSPITAL ENCOUNTER (OUTPATIENT)
Age: 43
Setting detail: OUTPATIENT SURGERY
Discharge: HOME | End: 2023-11-17
Attending: SPECIALIST | Admitting: SPECIALIST
Payer: COMMERCIAL

## 2023-11-17 VITALS
DIASTOLIC BLOOD PRESSURE: 62 MMHG | HEART RATE: 87 BPM | SYSTOLIC BLOOD PRESSURE: 108 MMHG | WEIGHT: 172 LBS | HEIGHT: 67 IN | OXYGEN SATURATION: 100 % | RESPIRATION RATE: 13 BRPM | BODY MASS INDEX: 27 KG/M2 | TEMPERATURE: 96.4 F

## 2023-11-17 DIAGNOSIS — N39.41 URGE URINARY INCONTINENCE: ICD-10-CM

## 2023-11-17 LAB
BACTERIA URNS QL MICRO: ABNORMAL
BILIRUB UR QL STRIP: NEGATIVE
CHARACTER UR: ABNORMAL
CLARITY UR: CLEAR
COLOR UR: YELLOW
EPI CELLS #/AREA URNS HPF: ABNORMAL /HPF (ref 0–5)
GLUCOSE UR STRIP-MCNC: NEGATIVE MG/DL
HCG, PREGNANCY URINE (POC): NEGATIVE
HGB UR QL STRIP.AUTO: NEGATIVE
KETONES UR STRIP-MCNC: NEGATIVE MG/DL
LEUKOCYTE ESTERASE UR QL STRIP: ABNORMAL
NITRITE UR QL STRIP: NEGATIVE
PH UR STRIP: 6 [PH] (ref 5–8)
PROT UR STRIP-MCNC: NEGATIVE MG/DL
RBC #/AREA URNS HPF: ABNORMAL /HPF (ref 0–2)
SP GR UR STRIP: 1.01 (ref 1–1.03)
UROBILINOGEN UR STRIP-ACNC: NORMAL EU/DL (ref 0–1)
WBC #/AREA URNS HPF: ABNORMAL /HPF (ref 0–5)

## 2023-11-17 PROCEDURE — 52287 CYSTOSCOPY CHEMODENERVATION: CPT | Performed by: SPECIALIST

## 2023-11-17 PROCEDURE — 3600000003 HC SURGERY LEVEL 3 BASE: Performed by: SPECIALIST

## 2023-11-17 PROCEDURE — 6360000002 HC RX W HCPCS: Performed by: NURSE ANESTHETIST, CERTIFIED REGISTERED

## 2023-11-17 PROCEDURE — 81001 URINALYSIS AUTO W/SCOPE: CPT

## 2023-11-17 PROCEDURE — 87086 URINE CULTURE/COLONY COUNT: CPT

## 2023-11-17 PROCEDURE — 2500000003 HC RX 250 WO HCPCS

## 2023-11-17 PROCEDURE — 3600000013 HC SURGERY LEVEL 3 ADDTL 15MIN: Performed by: SPECIALIST

## 2023-11-17 PROCEDURE — 2580000003 HC RX 258: Performed by: ANESTHESIOLOGY

## 2023-11-17 PROCEDURE — 7100000010 HC PHASE II RECOVERY - FIRST 15 MIN: Performed by: SPECIALIST

## 2023-11-17 PROCEDURE — 6360000002 HC RX W HCPCS: Performed by: SPECIALIST

## 2023-11-17 PROCEDURE — 81025 URINE PREGNANCY TEST: CPT

## 2023-11-17 PROCEDURE — 3700000001 HC ADD 15 MINUTES (ANESTHESIA): Performed by: SPECIALIST

## 2023-11-17 PROCEDURE — 7100000011 HC PHASE II RECOVERY - ADDTL 15 MIN: Performed by: SPECIALIST

## 2023-11-17 PROCEDURE — 3700000000 HC ANESTHESIA ATTENDED CARE: Performed by: SPECIALIST

## 2023-11-17 PROCEDURE — 2709999900 HC NON-CHARGEABLE SUPPLY: Performed by: SPECIALIST

## 2023-11-17 PROCEDURE — 2580000003 HC RX 258: Performed by: SPECIALIST

## 2023-11-17 RX ORDER — KETOROLAC TROMETHAMINE 30 MG/ML
INJECTION, SOLUTION INTRAMUSCULAR; INTRAVENOUS PRN
Status: DISCONTINUED | OUTPATIENT
Start: 2023-11-17 | End: 2023-11-17 | Stop reason: SDUPTHER

## 2023-11-17 RX ORDER — SODIUM CHLORIDE, SODIUM LACTATE, POTASSIUM CHLORIDE, CALCIUM CHLORIDE 600; 310; 30; 20 MG/100ML; MG/100ML; MG/100ML; MG/100ML
INJECTION, SOLUTION INTRAVENOUS CONTINUOUS
Status: DISCONTINUED | OUTPATIENT
Start: 2023-11-17 | End: 2023-11-17 | Stop reason: HOSPADM

## 2023-11-17 RX ORDER — ONDANSETRON 2 MG/ML
4 INJECTION INTRAMUSCULAR; INTRAVENOUS
Status: DISCONTINUED | OUTPATIENT
Start: 2023-11-17 | End: 2023-11-17 | Stop reason: HOSPADM

## 2023-11-17 RX ORDER — GLYCOPYRROLATE 0.2 MG/ML
0.4 INJECTION INTRAMUSCULAR; INTRAVENOUS ONCE
Status: COMPLETED | OUTPATIENT
Start: 2023-11-17 | End: 2023-11-17

## 2023-11-17 RX ORDER — LABETALOL HYDROCHLORIDE 5 MG/ML
10 INJECTION, SOLUTION INTRAVENOUS
Status: DISCONTINUED | OUTPATIENT
Start: 2023-11-17 | End: 2023-11-17 | Stop reason: HOSPADM

## 2023-11-17 RX ORDER — DIPHENHYDRAMINE HYDROCHLORIDE 50 MG/ML
12.5 INJECTION INTRAMUSCULAR; INTRAVENOUS
Status: DISCONTINUED | OUTPATIENT
Start: 2023-11-17 | End: 2023-11-17 | Stop reason: HOSPADM

## 2023-11-17 RX ORDER — MEPERIDINE HYDROCHLORIDE 50 MG/ML
12.5 INJECTION INTRAMUSCULAR; INTRAVENOUS; SUBCUTANEOUS EVERY 5 MIN PRN
Status: DISCONTINUED | OUTPATIENT
Start: 2023-11-17 | End: 2023-11-17 | Stop reason: HOSPADM

## 2023-11-17 RX ORDER — SODIUM CHLORIDE 0.9 % (FLUSH) 0.9 %
5-40 SYRINGE (ML) INJECTION EVERY 12 HOURS SCHEDULED
Status: DISCONTINUED | OUTPATIENT
Start: 2023-11-17 | End: 2023-11-17 | Stop reason: HOSPADM

## 2023-11-17 RX ORDER — SODIUM CHLORIDE 9 MG/ML
INJECTION INTRAVENOUS
Status: DISCONTINUED
Start: 2023-11-17 | End: 2023-11-17 | Stop reason: HOSPADM

## 2023-11-17 RX ORDER — GLYCOPYRROLATE 0.2 MG/ML
INJECTION INTRAMUSCULAR; INTRAVENOUS
Status: COMPLETED
Start: 2023-11-17 | End: 2023-11-17

## 2023-11-17 RX ORDER — SODIUM CHLORIDE 0.9 % (FLUSH) 0.9 %
5-40 SYRINGE (ML) INJECTION PRN
Status: DISCONTINUED | OUTPATIENT
Start: 2023-11-17 | End: 2023-11-17 | Stop reason: HOSPADM

## 2023-11-17 RX ORDER — PROPOFOL 10 MG/ML
INJECTION, EMULSION INTRAVENOUS PRN
Status: DISCONTINUED | OUTPATIENT
Start: 2023-11-17 | End: 2023-11-17 | Stop reason: SDUPTHER

## 2023-11-17 RX ORDER — MIDAZOLAM HYDROCHLORIDE 2 MG/2ML
2 INJECTION, SOLUTION INTRAMUSCULAR; INTRAVENOUS
Status: DISCONTINUED | OUTPATIENT
Start: 2023-11-17 | End: 2023-11-17 | Stop reason: HOSPADM

## 2023-11-17 RX ORDER — FENTANYL CITRATE 50 UG/ML
INJECTION, SOLUTION INTRAMUSCULAR; INTRAVENOUS PRN
Status: DISCONTINUED | OUTPATIENT
Start: 2023-11-17 | End: 2023-11-17 | Stop reason: SDUPTHER

## 2023-11-17 RX ORDER — MORPHINE SULFATE 2 MG/ML
2 INJECTION, SOLUTION INTRAMUSCULAR; INTRAVENOUS EVERY 5 MIN PRN
Status: DISCONTINUED | OUTPATIENT
Start: 2023-11-17 | End: 2023-11-17 | Stop reason: HOSPADM

## 2023-11-17 RX ORDER — SODIUM CHLORIDE 9 MG/ML
INJECTION, SOLUTION INTRAVENOUS PRN
Status: DISCONTINUED | OUTPATIENT
Start: 2023-11-17 | End: 2023-11-17 | Stop reason: HOSPADM

## 2023-11-17 RX ORDER — MIDAZOLAM HYDROCHLORIDE 1 MG/ML
INJECTION INTRAMUSCULAR; INTRAVENOUS PRN
Status: DISCONTINUED | OUTPATIENT
Start: 2023-11-17 | End: 2023-11-17 | Stop reason: SDUPTHER

## 2023-11-17 RX ORDER — CEFAZOLIN 2 G/1
INJECTION, POWDER, FOR SOLUTION INTRAMUSCULAR; INTRAVENOUS
Status: DISCONTINUED
Start: 2023-11-17 | End: 2023-11-17 | Stop reason: HOSPADM

## 2023-11-17 RX ORDER — NITROFURANTOIN 25; 75 MG/1; MG/1
100 CAPSULE ORAL 2 TIMES DAILY
Qty: 10 CAPSULE | Refills: 0 | Status: SHIPPED | OUTPATIENT
Start: 2023-11-17

## 2023-11-17 RX ORDER — METOCLOPRAMIDE HYDROCHLORIDE 5 MG/ML
10 INJECTION INTRAMUSCULAR; INTRAVENOUS
Status: DISCONTINUED | OUTPATIENT
Start: 2023-11-17 | End: 2023-11-17 | Stop reason: HOSPADM

## 2023-11-17 RX ADMIN — SODIUM CHLORIDE, POTASSIUM CHLORIDE, SODIUM LACTATE AND CALCIUM CHLORIDE: 600; 310; 30; 20 INJECTION, SOLUTION INTRAVENOUS at 16:07

## 2023-11-17 RX ADMIN — SODIUM CHLORIDE, POTASSIUM CHLORIDE, SODIUM LACTATE AND CALCIUM CHLORIDE: 600; 310; 30; 20 INJECTION, SOLUTION INTRAVENOUS at 15:57

## 2023-11-17 RX ADMIN — CEFAZOLIN 2000 MG: 2 INJECTION, POWDER, FOR SOLUTION INTRAMUSCULAR; INTRAVENOUS at 16:04

## 2023-11-17 RX ADMIN — FENTANYL CITRATE 100 MCG: 50 INJECTION, SOLUTION INTRAMUSCULAR; INTRAVENOUS at 16:01

## 2023-11-17 RX ADMIN — GLYCOPYRROLATE 0.4 MG: 0.2 INJECTION INTRAMUSCULAR; INTRAVENOUS at 16:46

## 2023-11-17 RX ADMIN — PROPOFOL 50 MG: 10 INJECTION, EMULSION INTRAVENOUS at 16:01

## 2023-11-17 RX ADMIN — MIDAZOLAM 2 MG: 1 INJECTION INTRAMUSCULAR; INTRAVENOUS at 15:57

## 2023-11-17 RX ADMIN — KETOROLAC TROMETHAMINE 30 MG: 30 INJECTION, SOLUTION INTRAMUSCULAR at 16:12

## 2023-11-17 RX ADMIN — PROPOFOL 150 MCG/KG/MIN: 10 INJECTION, EMULSION INTRAVENOUS at 16:02

## 2023-11-17 ASSESSMENT — LIFESTYLE VARIABLES: SMOKING_STATUS: 1

## 2023-11-17 ASSESSMENT — PAIN - FUNCTIONAL ASSESSMENT: PAIN_FUNCTIONAL_ASSESSMENT: 0-10

## 2023-11-17 NOTE — DISCHARGE INSTRUCTIONS
Encourage patient to drink at least 64 ounces of water a day. Reassure patient it is common to see gross hematuria and this can last a few days. No driving for 24 hours. Erlin Khan M.D.'s office will reach out to patient to set up a follow up  in 3 weeks to reassess symptoms and check Postvoid Residual .  Call office if fever > 101, shaking chills, inability to void. See Rx for antibiotics. Patient may use OTC Azo 1 po tid prn bladder pain or burning with urination. Activity  You have had anesthesia today  Do not drive, operate heavy equipment, consume alcoholic beverages, or make any important decisions  for 24 hours   If you are taking pain medication: Do not drive or consume alcohol. Take your time changing positions today. You may feel light headed or dizzy if you move too quickly. Continue your home medications as ordered by your physician. Diet   You can eat your normal diet when you feel well. You should start off with bland foods like chicken soup, toast, or yogurt. Then advance as tolerated. Drink plenty of fluids (unless your doctor tells you not to). Your urine should be very lightly colored without a strong odor.

## 2023-11-17 NOTE — OP NOTE
Operative Note      Patient: Yolie Rogers  YOB: 1980  MRN: [id number]    Date of Procedure: 11/17/2023    Pre-Op Diagnosis Codes:     * Urge urinary incontinence [N39.41]    Post-Op Diagnosis: Same       Procedure(s):  CYSTOSCOPY INJECTION BOTOX 200UNITS    Surgeon(s):  Nichelle Mohamud MD    Assistant:   * No surgical staff found *    Anesthesia: Monitor Anesthesia Care    Estimated Blood Loss (mL): Minimal    Complications: None    Specimens:   ID Type Source Tests Collected by Time Destination   1 : Urine Urine Urine, straight catheter CULTURE, URINE, URINALYSIS WITH REFLEX TO CULTURE Lenard Dorantes MD 11/17/2023 1611        Implants:  * No implants in log *      Drains: * No LDAs found *    Findings: see below     Detailed Description of Procedure:   Priya Saini:  This is a 44 y.o. female presents today for a cystoscopy and transvesical Botox injection to treat medically refractory urge urinary incontinence. After risks, benefits and alternatives of the procedure were discussed with the patient, informed consent was obtained and the patient elected to proceed. The patient was given Ancef 2 gm IV on call to OR for antibiotic prophylaxis. Patient had EPC cuffs placed for VTE prophylaxis. DETAILS OF PROCEDURE:  The patient was brought back to the operating room. She was prepped and draped in usual sterile surgical fashion after being placed in dorsal lithotomy position. A timeout was taken per protocol with everyone in agreement. The 22F rigid cystoscope was assembled using a 30 degree lens and passed per the urethra. The urethra was normal without any lesions. The bladder was entered with ease and inspected thoroughly and systematically which did not show any lesions or tumors, stone, fistulous tracts, diverticula. Both ureteral orifices were normal with clear efflux of urine. Once within the bladder the injection needle was advanced and purged of air. A total of 200 I. U. units of

## 2023-11-17 NOTE — ANESTHESIA PRE PROCEDURE
Readings from Last 3 Encounters:   11/17/23 108/62   09/21/23 118/62   08/15/23 98/62       NPO Status:                                                                                 BMI:   Wt Readings from Last 3 Encounters:   11/17/23 78 kg (172 lb)   09/21/23 77.6 kg (171 lb)   09/07/23 78 kg (172 lb)     There is no height or weight on file to calculate BMI.    CBC:   Lab Results   Component Value Date/Time    WBC 6.4 02/14/2022 10:45 AM    RBC 4.73 02/14/2022 10:45 AM    HGB 14.4 02/14/2022 10:45 AM    HCT 44.1 02/14/2022 10:45 AM    MCV 93.2 02/14/2022 10:45 AM    RDW 11.9 02/14/2022 10:45 AM     02/14/2022 10:45 AM       CMP:   Lab Results   Component Value Date/Time     02/14/2022 10:45 AM    K 4.8 02/14/2022 10:45 AM     02/14/2022 10:45 AM    CO2 22 02/14/2022 10:45 AM    BUN 14 02/14/2022 10:45 AM    CREATININE 0.90 02/14/2022 10:45 AM    GFRAA >60 02/14/2022 10:45 AM    LABGLOM >60 02/14/2022 10:45 AM    GLUCOSE 96 02/14/2022 10:45 AM    PROT 7.1 02/14/2022 10:45 AM    CALCIUM 9.9 02/14/2022 10:45 AM    BILITOT 0.31 02/14/2022 10:45 AM    ALKPHOS 75 02/14/2022 10:45 AM    AST 20 02/14/2022 10:45 AM    ALT 18 02/14/2022 10:45 AM       POC Tests: No results for input(s): \"POCGLU\", \"POCNA\", \"POCK\", \"POCCL\", \"POCBUN\", \"POCHEMO\", \"POCHCT\" in the last 72 hours.     Coags:   Lab Results   Component Value Date/Time    PROTIME 9.5 03/26/2018 08:08 AM    INR 0.9 03/26/2018 08:08 AM    APTT 24.2 03/26/2018 08:08 AM       HCG (If Applicable):   Lab Results   Component Value Date    PREGTESTUR negative 03/09/2020    HCG NEGATIVE 11/17/2023    HCGQUANT <1 10/08/2019        ABGs: No results found for: \"PHART\", \"PO2ART\", \"KNY4UFB\", \"JJA1LLP\", \"BEART\", \"G8OXDEUN\"     Type & Screen (If Applicable):  No results found for: \"LABABO\", \"LABRH\"    Drug/Infectious Status (If Applicable):  No results found for: \"HIV\", \"HEPCAB\"    COVID-19 Screening (If Applicable):   Lab Results   Component Value Date/Time

## 2023-11-17 NOTE — ANESTHESIA POSTPROCEDURE EVALUATION
Department of Anesthesiology  Postprocedure Note    Patient: Jeevan Early  MRN: [id number]  YOB: 1980  Date of evaluation: 11/17/2023      Procedure Summary     Date: 11/17/23 Room / Location: [address]    Anesthesia Start: 4357 Anesthesia Stop: 0437    Procedure: CYSTOSCOPY INJECTION BOTOX 200UNITS Diagnosis:       Urge urinary incontinence      (Urge urinary incontinence [N39.41])    Surgeons: Gerhard Romero MD Responsible Provider: Samy Odom MD    Anesthesia Type: MAC ASA Status: 2          Anesthesia Type: No value filed.     Reva Phase I: Reva Score: 10    Reva Phase II:        Anesthesia Post Evaluation    Patient location during evaluation: PACU  Patient participation: complete - patient participated  Level of consciousness: awake and alert  Airway patency: patent  Nausea & Vomiting: no nausea and no vomiting  Complications: no  Cardiovascular status: hemodynamically stable  Respiratory status: room air and spontaneous ventilation  Hydration status: euvolemic  Multimodal analgesia pain management approach  Pain management: adequate

## 2023-11-17 NOTE — H&P
[facility]  Samia Jeffery. Dara Pope MD FACS    History and Physical    Patient:  Belinda Stacy  MRN: [id number]  YOB: 1980    CHIEF COMPLAINT:  Urge urinary incontinence     HISTORY OF PRESENT ILLNESS:   The patient is a 44 y.o. female who presents with Urge urinary incontinence  Patient did well after 8/15/23 Cystoscopy and transvesical Botox (100 IU) Rx. She was down to using 0 pads per day. However, her benefit began to wear off in the past few weeks. She is here for a repeat Cystoscopy and transvesical Botox using (200 IU) under MAC to maintain the clinical effect. Summary of old records:   Frequency every 2 hours with occasional urgency, Urge urinary incontinence: 2 pops/d, tried trospium, ditropan and detrol wo success; 10/21/20 PVR = 0 mL; Myrbetriq 50 mg po qd, solifenacin (Vesicare) 5 mg qd (discussed trying 10 mg qd 2/24/21); switch to Gemtesa (vibegron) 75 mg qd 5/4/23 11/24/20 VD: FCV=758 mL, MAC=1024 mL/d, TV=8.33 voids/d, BCO=797 mL/void, Nx0, NPi=26%, NUP=40 mL/hr  MALI: referred to PT 5/4/23  3/9/20 CT neg  Back surgery for sciatica, L5-S1 foraminotomy 11/9/20 (Meenu)  Recurrent UTI\"s: 8/8/22 Serratia; 4/18/23, 5/1/23 Salmonella-Bactrim bid x 10 day then qd x 1 month       Patient's old records, notes and chart reviewed and summarized above. Past Medical History:    Past Medical History:   Diagnosis Date    ADD (attention deficit disorder)     Allergic rhinitis     Anxiety     Back pain     Body piercing     several,instructed to remove. will remove tongue/nose    Cancer (HCC) Cervical cancer, neuroendocrine tumor 2020    Carpal tunnel syndrome, bilateral 05/19/2016    Chronic back pain     started pain mgmnt 2/218    Depression     Foot pain     left (d/t back)    History of cervical cancer age 17    History of colon polyps     x 1 precancerous polyp    History of gastroesophageal reflux (GERD)     \"mild\"  no medications    History of miscarriage     x 2 Financial Resource Strain: Low Risk  (2/13/2023)    Overall Financial Resource Strain (CARDIA)     Difficulty of Paying Living Expenses: Not hard at all   Food Insecurity: No Food Insecurity (2/13/2023)    Hunger Vital Sign     Worried About Running Out of Food in the Last Year: Never true     Ran Out of Food in the Last Year: Never true   Transportation Needs: Unknown (2/13/2023)    PRAPARE - Transportation     Lack of Transportation (Medical): Not on file     Lack of Transportation (Non-Medical): No   Physical Activity: Not on file   Stress: Not on file   Social Connections: Not on file   Intimate Partner Violence: Not on file   Housing Stability: Unknown (2/13/2023)    Housing Stability Vital Sign     Unable to Pay for Housing in the Last Year: Not on file     Number of Places Lived in the Last Year: Not on file     Unstable Housing in the Last Year: No       Family History:    Family History   Problem Relation Age of Onset    High Cholesterol Mother     Other Mother         premature menopause    Hypertension Father     Diabetes Neg Hx     Cancer Neg Hx        REVIEW OF SYSTEMS:  All reviewed and negative except for pertinent ones listed in HPI. Physical Exam:      This a 44 y.o. female   No data found. Constitutional: Patient in no acute distress. Neuro: Alert and oriented to person, place and time. Psych: mood and affect normal  HEENT negative  Lungs: Respiratory effort is normal; CTA  Cardiovascular: Normal peripheral pulses; R3 wo murmur  Abdomen: Soft, non-tender, non-distended with no CVA, flank pain    LABS:   No results for input(s): \"WBC\", \"HGB\", \"HCT\", \"MCV\", \"PLT\" in the last 72 hours. No results for input(s): \"NA\", \"K\", \"CL\", \"CO2\", \"PHOS\", \"BUN\", \"CREATININE\", \"CA\" in the last 72 hours.     Additional Lab/culture results:    Urinalysis: No results for input(s): \"COLORU\", \"PHUR\", \"LABCAST\", \"WBCUA\", \"RBCUA\", \"MUCUS\", \"TRICHOMONAS\", \"YEAST\", \"BACTERIA\", \"CLARITYU\", \"SPECGRAV\",

## 2023-11-18 ENCOUNTER — PATIENT MESSAGE (OUTPATIENT)
Dept: FAMILY MEDICINE CLINIC | Age: 43
End: 2023-11-18

## 2023-11-18 LAB
MICROORGANISM SPEC CULT: NO GROWTH
SPECIMEN DESCRIPTION: NORMAL

## 2023-11-20 RX ORDER — SEMAGLUTIDE 1 MG/.5ML
1 INJECTION, SOLUTION SUBCUTANEOUS
Qty: 6 ML | Refills: 0 | Status: SHIPPED | OUTPATIENT
Start: 2023-11-20

## 2023-11-20 NOTE — TELEPHONE ENCOUNTER
From: Pieter Wood  To: James Willis  Sent: 11/18/2023 2:25 PM EST  Subject: Shashi Soto refill    [facility] just sent me an email that they have not received a refill request for my wegovy from you yet. I only have 1 injection left in my box, which I'll use tomorrow.

## 2023-12-07 ENCOUNTER — OFFICE VISIT (OUTPATIENT)
Age: 43
End: 2023-12-07

## 2023-12-07 VITALS — WEIGHT: 172 LBS | BODY MASS INDEX: 27 KG/M2 | HEIGHT: 67 IN

## 2023-12-07 DIAGNOSIS — N39.41 URGE INCONTINENCE: Primary | ICD-10-CM

## 2023-12-07 DIAGNOSIS — R35.0 URINARY FREQUENCY: ICD-10-CM

## 2023-12-07 LAB
BILIRUBIN, POC: NORMAL
BLOOD URINE, POC: NORMAL
CLARITY, POC: CLEAR
COLOR, POC: YELLOW
GLUCOSE URINE, POC: NORMAL
KETONES, POC: NORMAL
LEUKOCYTE EST, POC: NORMAL
NITRITE, POC: NORMAL
PH, POC: NORMAL
POST VOID RESIDUAL (PVR): NORMAL ML
PROTEIN, POC: NORMAL
SPECIFIC GRAVITY, POC: NORMAL
UROBILINOGEN, POC: NORMAL

## 2023-12-07 RX ORDER — VENLAFAXINE HYDROCHLORIDE 37.5 MG/1
CAPSULE, EXTENDED RELEASE ORAL
COMMUNITY
Start: 2023-11-17

## 2024-01-04 ENCOUNTER — ANESTHESIA (OUTPATIENT)
Dept: OPERATING ROOM | Age: 44
End: 2024-01-04
Payer: COMMERCIAL

## 2024-01-04 ENCOUNTER — ANESTHESIA EVENT (OUTPATIENT)
Dept: OPERATING ROOM | Age: 44
End: 2024-01-04
Payer: COMMERCIAL

## 2024-01-04 ENCOUNTER — HOSPITAL ENCOUNTER (OUTPATIENT)
Age: 44
Setting detail: OUTPATIENT SURGERY
Discharge: HOME | End: 2024-01-04
Attending: OPHTHALMOLOGY | Admitting: OPHTHALMOLOGY
Payer: COMMERCIAL

## 2024-01-04 VITALS
HEART RATE: 65 BPM | RESPIRATION RATE: 18 BRPM | OXYGEN SATURATION: 99 % | TEMPERATURE: 97.2 F | DIASTOLIC BLOOD PRESSURE: 61 MMHG | HEIGHT: 67 IN | WEIGHT: 170 LBS | BODY MASS INDEX: 26.68 KG/M2 | SYSTOLIC BLOOD PRESSURE: 117 MMHG

## 2024-01-04 PROBLEM — H35.342 MACULAR HOLE, LEFT EYE: Status: ACTIVE | Noted: 2024-01-04

## 2024-01-04 LAB — HCG, PREGNANCY URINE (POC): NEGATIVE

## 2024-01-04 PROCEDURE — 81025 URINE PREGNANCY TEST: CPT

## 2024-01-04 PROCEDURE — 7100000011 HC PHASE II RECOVERY - ADDTL 15 MIN: Performed by: OPHTHALMOLOGY

## 2024-01-04 PROCEDURE — 7100000010 HC PHASE II RECOVERY - FIRST 15 MIN: Performed by: OPHTHALMOLOGY

## 2024-01-04 PROCEDURE — 3600000014 HC SURGERY LEVEL 4 ADDTL 15MIN: Performed by: OPHTHALMOLOGY

## 2024-01-04 PROCEDURE — 3700000001 HC ADD 15 MINUTES (ANESTHESIA): Performed by: OPHTHALMOLOGY

## 2024-01-04 PROCEDURE — 2709999900 HC NON-CHARGEABLE SUPPLY: Performed by: OPHTHALMOLOGY

## 2024-01-04 PROCEDURE — 2720000010 HC SURG SUPPLY STERILE: Performed by: OPHTHALMOLOGY

## 2024-01-04 PROCEDURE — 3600000004 HC SURGERY LEVEL 4 BASE: Performed by: OPHTHALMOLOGY

## 2024-01-04 PROCEDURE — 6370000000 HC RX 637 (ALT 250 FOR IP): Performed by: OPHTHALMOLOGY

## 2024-01-04 PROCEDURE — 3700000000 HC ANESTHESIA ATTENDED CARE: Performed by: OPHTHALMOLOGY

## 2024-01-04 PROCEDURE — 6360000002 HC RX W HCPCS: Performed by: NURSE ANESTHETIST, CERTIFIED REGISTERED

## 2024-01-04 PROCEDURE — 2580000003 HC RX 258: Performed by: OPHTHALMOLOGY

## 2024-01-04 PROCEDURE — 2500000003 HC RX 250 WO HCPCS: Performed by: OPHTHALMOLOGY

## 2024-01-04 PROCEDURE — 6360000002 HC RX W HCPCS: Performed by: OPHTHALMOLOGY

## 2024-01-04 PROCEDURE — 2580000003 HC RX 258: Performed by: ANESTHESIOLOGY

## 2024-01-04 PROCEDURE — C1713 ANCHOR/SCREW BN/BN,TIS/BN: HCPCS | Performed by: OPHTHALMOLOGY

## 2024-01-04 RX ORDER — SODIUM CHLORIDE 9 MG/ML
INJECTION, SOLUTION INTRAVENOUS PRN
Status: CANCELLED | OUTPATIENT
Start: 2024-01-04

## 2024-01-04 RX ORDER — MIDAZOLAM HYDROCHLORIDE 1 MG/ML
INJECTION INTRAMUSCULAR; INTRAVENOUS PRN
Status: DISCONTINUED | OUTPATIENT
Start: 2024-01-04 | End: 2024-01-04 | Stop reason: SDUPTHER

## 2024-01-04 RX ORDER — SODIUM CHLORIDE 0.9 % (FLUSH) 0.9 %
5-40 SYRINGE (ML) INJECTION EVERY 12 HOURS SCHEDULED
Status: CANCELLED | OUTPATIENT
Start: 2024-01-04

## 2024-01-04 RX ORDER — TROPICAMIDE 10 MG/ML
1 SOLUTION/ DROPS OPHTHALMIC EVERY 5 MIN PRN
Status: COMPLETED | OUTPATIENT
Start: 2024-01-04 | End: 2024-01-04

## 2024-01-04 RX ORDER — SODIUM CHLORIDE 0.9 % (FLUSH) 0.9 %
5-40 SYRINGE (ML) INJECTION EVERY 12 HOURS SCHEDULED
Status: DISCONTINUED | OUTPATIENT
Start: 2024-01-04 | End: 2024-01-04 | Stop reason: HOSPADM

## 2024-01-04 RX ORDER — FENTANYL CITRATE 50 UG/ML
25 INJECTION, SOLUTION INTRAMUSCULAR; INTRAVENOUS EVERY 5 MIN PRN
Status: DISCONTINUED | OUTPATIENT
Start: 2024-01-04 | End: 2024-01-04 | Stop reason: HOSPADM

## 2024-01-04 RX ORDER — MIDAZOLAM HYDROCHLORIDE 2 MG/2ML
1 INJECTION, SOLUTION INTRAMUSCULAR; INTRAVENOUS EVERY 10 MIN PRN
Status: CANCELLED | OUTPATIENT
Start: 2024-01-04

## 2024-01-04 RX ORDER — TOBRAMYCIN AND DEXAMETHASONE 3; 1 MG/ML; MG/ML
1 SUSPENSION/ DROPS OPHTHALMIC ONCE
Status: COMPLETED | OUTPATIENT
Start: 2024-01-04 | End: 2024-01-04

## 2024-01-04 RX ORDER — INDOCYANINE GREEN AND WATER 25 MG
KIT INJECTION PRN
Status: DISCONTINUED | OUTPATIENT
Start: 2024-01-04 | End: 2024-01-04 | Stop reason: HOSPADM

## 2024-01-04 RX ORDER — SODIUM CHLORIDE 0.9 % (FLUSH) 0.9 %
5-40 SYRINGE (ML) INJECTION PRN
Status: DISCONTINUED | OUTPATIENT
Start: 2024-01-04 | End: 2024-01-04 | Stop reason: HOSPADM

## 2024-01-04 RX ORDER — PHENYLEPHRINE HYDROCHLORIDE 100 MG/ML
1 SOLUTION/ DROPS OPHTHALMIC EVERY 5 MIN PRN
Status: COMPLETED | OUTPATIENT
Start: 2024-01-04 | End: 2024-01-04

## 2024-01-04 RX ORDER — FENTANYL CITRATE 50 UG/ML
50 INJECTION, SOLUTION INTRAMUSCULAR; INTRAVENOUS EVERY 5 MIN PRN
Status: DISCONTINUED | OUTPATIENT
Start: 2024-01-04 | End: 2024-01-04 | Stop reason: HOSPADM

## 2024-01-04 RX ORDER — CEFTAZIDIME 1 G/1
INJECTION, POWDER, FOR SOLUTION INTRAMUSCULAR; INTRAVENOUS PRN
Status: DISCONTINUED | OUTPATIENT
Start: 2024-01-04 | End: 2024-01-04 | Stop reason: HOSPADM

## 2024-01-04 RX ORDER — DEXTROSE MONOHYDRATE 25 G/50ML
INJECTION, SOLUTION INTRAVENOUS PRN
Status: DISCONTINUED | OUTPATIENT
Start: 2024-01-04 | End: 2024-01-04 | Stop reason: HOSPADM

## 2024-01-04 RX ORDER — PROPOFOL 10 MG/ML
INJECTION, EMULSION INTRAVENOUS PRN
Status: DISCONTINUED | OUTPATIENT
Start: 2024-01-04 | End: 2024-01-04 | Stop reason: SDUPTHER

## 2024-01-04 RX ORDER — ERYTHROMYCIN 5 MG/G
OINTMENT OPHTHALMIC PRN
Status: DISCONTINUED | OUTPATIENT
Start: 2024-01-04 | End: 2024-01-04 | Stop reason: HOSPADM

## 2024-01-04 RX ORDER — SODIUM CHLORIDE 9 MG/ML
INJECTION, SOLUTION INTRAVENOUS PRN
Status: DISCONTINUED | OUTPATIENT
Start: 2024-01-04 | End: 2024-01-04 | Stop reason: HOSPADM

## 2024-01-04 RX ORDER — WATER 10 ML/10ML
INJECTION INTRAMUSCULAR; INTRAVENOUS; SUBCUTANEOUS PRN
Status: DISCONTINUED | OUTPATIENT
Start: 2024-01-04 | End: 2024-01-04 | Stop reason: HOSPADM

## 2024-01-04 RX ORDER — SODIUM CHLORIDE 0.9 % (FLUSH) 0.9 %
5-40 SYRINGE (ML) INJECTION PRN
Status: CANCELLED | OUTPATIENT
Start: 2024-01-04

## 2024-01-04 RX ORDER — SODIUM CHLORIDE, SODIUM LACTATE, POTASSIUM CHLORIDE, CALCIUM CHLORIDE 600; 310; 30; 20 MG/100ML; MG/100ML; MG/100ML; MG/100ML
INJECTION, SOLUTION INTRAVENOUS CONTINUOUS
Status: DISCONTINUED | OUTPATIENT
Start: 2024-01-04 | End: 2024-01-04 | Stop reason: HOSPADM

## 2024-01-04 RX ORDER — BALANCED SALT SOLUTION ENRICHED WITH BICARBONATE, DEXTROSE, AND GLUTATHIONE
KIT INTRAOCULAR PRN
Status: DISCONTINUED | OUTPATIENT
Start: 2024-01-04 | End: 2024-01-04 | Stop reason: HOSPADM

## 2024-01-04 RX ORDER — ONDANSETRON 2 MG/ML
4 INJECTION INTRAMUSCULAR; INTRAVENOUS
Status: DISCONTINUED | OUTPATIENT
Start: 2024-01-04 | End: 2024-01-04 | Stop reason: HOSPADM

## 2024-01-04 RX ORDER — TROPICAMIDE 10 MG/ML
SOLUTION/ DROPS OPHTHALMIC PRN
Status: DISCONTINUED | OUTPATIENT
Start: 2024-01-04 | End: 2024-01-04 | Stop reason: HOSPADM

## 2024-01-04 RX ORDER — FENTANYL CITRATE 50 UG/ML
INJECTION, SOLUTION INTRAMUSCULAR; INTRAVENOUS PRN
Status: DISCONTINUED | OUTPATIENT
Start: 2024-01-04 | End: 2024-01-04 | Stop reason: SDUPTHER

## 2024-01-04 RX ADMIN — PHENYLEPHRINE HYDROCHLORIDE 1 DROP: 100 SOLUTION/ DROPS OPHTHALMIC at 07:05

## 2024-01-04 RX ADMIN — TOBRAMYCIN AND DEXAMETHASONE 1 DROP: 3; 1 SUSPENSION/ DROPS OPHTHALMIC at 06:55

## 2024-01-04 RX ADMIN — TROPICAMIDE 1 DROP: 10 SOLUTION/ DROPS OPHTHALMIC at 07:00

## 2024-01-04 RX ADMIN — TROPICAMIDE 1 DROP: 10 SOLUTION/ DROPS OPHTHALMIC at 06:55

## 2024-01-04 RX ADMIN — PHENYLEPHRINE HYDROCHLORIDE 1 DROP: 100 SOLUTION/ DROPS OPHTHALMIC at 06:55

## 2024-01-04 RX ADMIN — FENTANYL CITRATE 25 MCG: 50 INJECTION, SOLUTION INTRAMUSCULAR; INTRAVENOUS at 08:04

## 2024-01-04 RX ADMIN — MIDAZOLAM 1 MG: 1 INJECTION INTRAMUSCULAR; INTRAVENOUS at 08:02

## 2024-01-04 RX ADMIN — PROPOFOL 50 MG: 10 INJECTION, EMULSION INTRAVENOUS at 08:04

## 2024-01-04 RX ADMIN — TROPICAMIDE 1 DROP: 10 SOLUTION/ DROPS OPHTHALMIC at 07:05

## 2024-01-04 RX ADMIN — MIDAZOLAM 1 MG: 1 INJECTION INTRAMUSCULAR; INTRAVENOUS at 07:57

## 2024-01-04 RX ADMIN — SODIUM CHLORIDE, POTASSIUM CHLORIDE, SODIUM LACTATE AND CALCIUM CHLORIDE: 600; 310; 30; 20 INJECTION, SOLUTION INTRAVENOUS at 07:05

## 2024-01-04 RX ADMIN — FENTANYL CITRATE 25 MCG: 50 INJECTION, SOLUTION INTRAMUSCULAR; INTRAVENOUS at 07:57

## 2024-01-04 RX ADMIN — PHENYLEPHRINE HYDROCHLORIDE 1 DROP: 100 SOLUTION/ DROPS OPHTHALMIC at 07:00

## 2024-01-04 ASSESSMENT — PAIN SCALES - GENERAL
PAINLEVEL_OUTOF10: 0

## 2024-01-04 ASSESSMENT — LIFESTYLE VARIABLES: SMOKING_STATUS: 1

## 2024-01-04 ASSESSMENT — PAIN - FUNCTIONAL ASSESSMENT: PAIN_FUNCTIONAL_ASSESSMENT: NONE - DENIES PAIN

## 2024-01-04 NOTE — ANESTHESIA POSTPROCEDURE EVALUATION
Department of Anesthesiology  Postprocedure Note    Patient: Shena Pacheco  MRN: [id number]  YOB: 1980  Date of evaluation: 1/4/2024    Procedure Summary     Date: 01/04/24 Room / Location: [address]    Anesthesia Start: 0750 Anesthesia Stop: 0840    Procedure: VITRECTOMY 25 GAUGE, AIR GAS EXCHANGE SF6 AT 16%,  AIR FLUID EXCHANGE, BRILLIANT BLUE INJECTION, ICG (Left) Diagnosis:       Macular hole, left eye      (Macular hole, left eye [H35.342])    Surgeons: Xu Mixon MD Responsible Provider: Jose Espinoza MD    Anesthesia Type: MAC ASA Status: 2          Anesthesia Type: MAC    Reva Phase I: Reva Score: 10    Reva Phase II: Reva Score: 10  POST-OP ANESTHESIA NOTE       /61   Pulse 65   Temp 97.2 °F (36.2 °C)   Resp 18   Ht 1.702 m (5' 7\")   Wt 77.1 kg (170 lb)   SpO2 99%   BMI 26.63 kg/m²    Pain Assessment: 0-10  Pain Level: 0       Anesthesia Post Evaluation    Patient location during evaluation: PACU  Patient participation: complete - patient participated  Level of consciousness: awake  Pain score: 0  Airway patency: patent  Nausea & Vomiting: no nausea and no vomiting  Cardiovascular status: hemodynamically stable  Respiratory status: acceptable  Hydration status: stable  Pain management: adequate        No notable events documented.

## 2024-01-04 NOTE — OP NOTE
Operative Note    Signed         Op Note     Shena Pacheco  1/4/2024    8:54 AM  1980  [id number]     Pre-operative Diagnosis: Macular Hole Left eye     Post-operative Diagnosis: Same     Procedure: Vitrectomy, ICG, Membrane Peeling, ICG and Blue Left Eye     Anesthesia: [facility]     Surgeons/Assistants: Xu Mixon MD     Estimated Blood Loss: Less than 1 ML     Complications: None     Specimens: None     Reason for operation:  The patient has significant vision loss that is interfering with daily activities. Patient wishes to have surgery to close macular hole in anticipation of better vision. Patient understands necessity of face down positioning for 3 days after surgery. Patient understands vision is not likely to return fully to normal and it will take 3-6 months to obtain best vision.  Patient understands risks of surgery include, but are not limited to, bleeding, infection and retinal detachment. Cataract development is accelerated, usually resulting in cataract surgery in 6-24 months      Procedure: The patient was brought to the operating room in good condition. Transient Propofol was given. Retrobulbar and topical anaesthesia were administered. The patient was prepped and draped in the usual manner. 25 gauge vitrectomy trocars were inserted in the usual quadrants. Infusion was inserted in the inferior temporal quadrant. Light pipe and ocutome placed through the superior trocars. Vitreous was detached using suction from the ocutome. It was removed from anterior to posterior and trimmed out past the equator in all quadrants. ICG and Brilliant Blue was placed over the posterior pole and remained in place for 30 seconds. It was removed with silicone tipped extrusion. The ILM was peeled and removed with intraocular forceps. An air-fluid exchange was done. Air was exchanged for 16% SF6. No significant bleeding occurred at any time. No tears or detachment were found with scleral depression. The trocars were  removed and sutured if there any leakage. Subtenon's antibiotic was injected in the inferior nasal quadrant. The eye was patched in the usual fashion and the patient taken to the recovery room in good condition.  Consults

## 2024-01-04 NOTE — H&P
History and Physical    Pt Name: Shena Pacheco  MRN: [id number]  YOB: 1980  Date of evaluation: 1/4/2024    SUBJECTIVE:   History of Chief Complaint:    Patient presents preprocedure for VITRECTOMY 25 GAUGE, GAS FLUID EXCHANGE, BRILLIANT BLUE.  She reports visual changes since October 2023, blurriness.  She has a history of vitrectomy on the right in the past, has a small cataract now currently.  She says that she has had a gel injection/procedure on the left proactively as well.   She has been diagnosed with macular hole, has been scheduled for procedure today.  Past Medical History    has a past medical history of ADD (attention deficit disorder), Allergic rhinitis, Anxiety, Back pain, Body piercing, Cancer (HCC), Carpal tunnel syndrome, bilateral, Chronic back pain, Depression, Foot pain, History of cervical cancer, History of colon polyps, History of gastroesophageal reflux (GERD), History of miscarriage, Hx of blood clots, Hyperlipidemia, Lumbago, Neuroendocrine tumor, OAB (overactive bladder), Obesity (BMI 30-39.9), PUD (peptic ulcer disease), Sciatica, Under care of team, Under care of team, and Urge incontinence.  Past Surgical History   has a past surgical history that includes Dilation and curettage of uterus; Tonsillectomy; [place] tooth extraction; other surgical history (03/26/2018); back surgery (09/19/2016); pr laminectomy w/o ffd > 2 vert seg lumbar (N/A, 04/23/2018); pr arthrodesis posterior interbody 1 ntrspc lumbar (N/A, 06/25/2018); LEEP (age 18); Upper gastrointestinal endoscopy (05/18/2020); Colonoscopy (05/18/2020); Spinal Cord Stimulator Surgery; Upper gastrointestinal endoscopy (05/28/2020); Upper gastrointestinal endoscopy (N/A, 05/28/2020); Upper gastrointestinal endoscopy (05/28/2020); Eye surgery; Urethral Surgery (N/A, 08/15/2023); Cosmetic surgery (Breast enhancement); eye surgery (L & R eye vitrectomies); Cystoscopy (11/17/2023); and Cystoscopy (N/A,

## 2024-01-04 NOTE — ANESTHESIA PRE PROCEDURE
\"HEPCAB\"    COVID-19 Screening (If Applicable):   Lab Results   Component Value Date/Time    COVID19 Not Detected 05/26/2020 05:01 PM         Anesthesia Evaluation  Patient summary reviewed and Nursing notes reviewed   no history of anesthetic complications:   Airway: Mallampati: II  TM distance: >3 FB   Neck ROM: full  Mouth opening: > = 3 FB   Dental:    (+) other      Pulmonary: breath sounds clear to auscultation  (+)           current smoker (vapes marijuana daily)          Patient did not smoke on day of surgery.                 Cardiovascular:  Exercise tolerance: good (>4 METS)      (-) past MI, CAD, CABG/stent, dysrhythmias,  angina and  CHF    ECG reviewed  Rhythm: regular  Rate: normal           Beta Blocker:  Not on Beta Blocker         Neuro/Psych:   (+) neuromuscular disease:, psychiatric history:            GI/Hepatic/Renal:   (+) PUD     (-) GERD       Endo/Other:    (+) : arthritis:., malignancy/cancer.    (-) diabetes mellitus               Abdominal: normal exam            Vascular:     - DVT and PE.      Other Findings:             Anesthesia Plan      MAC     ASA 2       Induction: intravenous.    MIPS: Postoperative opioids intended.  Anesthetic plan and risks discussed with patient and spouse.      Plan discussed with CRNA.                    Jose Espinoza MD   1/4/2024

## 2024-01-04 NOTE — CONSULTS
Op Note    Shena Pacheco  1/4/2024    8:54 AM  1980  [id number]    Pre-operative Diagnosis: Macular Hole Left eye    Post-operative Diagnosis: Same    Procedure: Vitrectomy, ICG, Membrane Peeling, ICG and Blue Left Eye    Anesthesia: MAC    Surgeons/Assistants: Xu Mixon MD    Estimated Blood Loss: Less than 1 ML    Complications: None    Specimens: None    Reason for operation:  The patient has significant vision loss that is interfering with daily activities. Patient wishes to have surgery to close macular hole in anticipation of better vision. Patient understands necessity of face down positioning for 3 days after surgery. Patient understands vision is not likely to return fully to normal and it will take 3-6 months to obtain best vision.  Patient understands risks of surgery include, but are not limited to, bleeding, infection and retinal detachment. Cataract development is accelerated, usually resulting in cataract surgery in 6-24 months     Procedure: The patient was brought to the operating room in good condition. Transient Propofol was given. Retrobulbar and topical anaesthesia were administered. The patient was prepped and draped in the usual manner. 25 gauge vitrectomy trocars were inserted in the usual quadrants. Infusion was inserted in the inferior temporal quadrant. Light pipe and ocutome placed through the superior trocars. Vitreous was detached using suction from the ocutome. It was removed from anterior to posterior and trimmed out past the equator in all quadrants. ICG and Brilliant Blue was placed over the posterior pole and remained in place for 30 seconds. It was removed with silicone tipped extrusion. The ILM was peeled and removed with intraocular forceps. An air-fluid exchange was done. Air was exchanged for 16% SF6. No significant bleeding occurred at any time. No tears or detachment were found with scleral depression. The trocars were removed and sutured if there any leakage.  Subtenon's antibiotic was injected in the inferior nasal quadrant. The eye was patched in the usual fashion and the patient taken to the recovery room in good condition.  Consults

## 2024-01-05 ENCOUNTER — TELEMEDICINE (OUTPATIENT)
Dept: FAMILY MEDICINE CLINIC | Age: 44
End: 2024-01-05
Payer: COMMERCIAL

## 2024-01-05 DIAGNOSIS — E66.3 OVERWEIGHT: ICD-10-CM

## 2024-01-05 DIAGNOSIS — M51.36 DEGENERATIVE DISC DISEASE, LUMBAR: Primary | ICD-10-CM

## 2024-01-05 PROCEDURE — 99442 PR PHYS/QHP TELEPHONE EVALUATION 11-20 MIN: CPT | Performed by: PHYSICIAN ASSISTANT

## 2024-01-05 RX ORDER — TIZANIDINE 2 MG/1
2 TABLET ORAL EVERY 6 HOURS PRN
COMMUNITY

## 2024-01-05 ASSESSMENT — PATIENT HEALTH QUESTIONNAIRE - PHQ9
2. FEELING DOWN, DEPRESSED OR HOPELESS: 0
1. LITTLE INTEREST OR PLEASURE IN DOING THINGS: 0
SUM OF ALL RESPONSES TO PHQ QUESTIONS 1-9: 0
SUM OF ALL RESPONSES TO PHQ QUESTIONS 1-9: 0
SUM OF ALL RESPONSES TO PHQ9 QUESTIONS 1 & 2: 0
SUM OF ALL RESPONSES TO PHQ QUESTIONS 1-9: 0
SUM OF ALL RESPONSES TO PHQ QUESTIONS 1-9: 0

## 2024-01-05 ASSESSMENT — ENCOUNTER SYMPTOMS
BACK PAIN: 1
COLOR CHANGE: 0

## 2024-01-05 NOTE — PROGRESS NOTES
treatmentplan. Follow up as directed.       Please note that this chart was generated using voice recognition Dragon dictation software.  Although every effort was made to ensure the accuracy of this automated transcription, some errors in transcription may have occurred.     Electronically signed by CHRIS WHITAKER PA-C on 1/5/2024 at 1:16 PM

## 2024-01-09 ENCOUNTER — OFFICE VISIT (OUTPATIENT)
Dept: OBGYN CLINIC | Age: 44
End: 2024-01-09

## 2024-01-09 VITALS
HEART RATE: 60 BPM | WEIGHT: 173.8 LBS | SYSTOLIC BLOOD PRESSURE: 106 MMHG | HEIGHT: 67 IN | DIASTOLIC BLOOD PRESSURE: 60 MMHG | BODY MASS INDEX: 27.28 KG/M2

## 2024-01-09 DIAGNOSIS — Z01.419 ENCOUNTER FOR WELL WOMAN EXAM WITH ROUTINE GYNECOLOGICAL EXAM: ICD-10-CM

## 2024-01-09 DIAGNOSIS — Z98.82 H/O BILATERAL BREAST IMPLANTS: ICD-10-CM

## 2024-01-09 DIAGNOSIS — Z12.31 ENCOUNTER FOR SCREENING MAMMOGRAM FOR BREAST CANCER: Primary | ICD-10-CM

## 2024-01-09 DIAGNOSIS — Z98.890 HISTORY OF LOOP ELECTRICAL EXCISION PROCEDURE (LEEP): ICD-10-CM

## 2024-01-09 DIAGNOSIS — E28.39 PRIMARY OVARIAN INSUFFICIENCY: ICD-10-CM

## 2024-01-09 DIAGNOSIS — T85.42XA DISPLACEMENT OF BREAST IMPLANT, INITIAL ENCOUNTER: ICD-10-CM

## 2024-01-09 RX ORDER — ESTRADIOL 1 MG/1
2 TABLET ORAL DAILY
Qty: 21 TABLET | Refills: 3 | Status: CANCELLED | OUTPATIENT
Start: 2024-01-09

## 2024-01-09 RX ORDER — PROGESTERONE 200 MG/1
CAPSULE ORAL
Qty: 36 CAPSULE | Refills: 3 | Status: CANCELLED | OUTPATIENT
Start: 2024-01-09

## 2024-01-09 NOTE — PROGRESS NOTES
patient states this is a change from her initial post-op results. Dr Matute completed original surgery in 2019.    -estrogen and fsh levels not consistent with POI last year. Will recheck and likely recommend hormone therapy pending updated results.     Per [facility], For \"Secondary amenorrhea, We initiate full replacement doses of estrogen with oral estradiol (2 mg/day) or transdermal estradiol (100 mcg patch). An estradiol vaginal ring (100 mcg daily) is another option. This dose is higher than what is used for postmenopausal females and is based upon the average daily production of estradiol by the premenopausal ovary... Most women with POI will have an intact uterus and require a progestin to prevent estrogen-induced endometrial hyperplasia and carcinoma. Our first-line progestin is micronized progesterone (MP) 200 mg per day for the first 12 days of the month.\"      No follow-ups on file.    Counseling Completed:  -Discussed recommendations to repeat pap as per American Society for Colposcopy and Cervical Pathology guidelines. Encouraged annual gynecologic evaluation.  -Breast cancer screening with mammography typically begins at age 40 but may be indicated sooner based on family history and patient's individual risk factors.  -Discussed birth control and barrier recommendations. Discussed STD counseling and prevention.         Routine health maintenance per patients PCP encouraged    Return to this office annually and PRN.    The patient was seen and evaluated face to face by SHREE Lennon CNP   1/12/2024, 1:27 PM    On this date I have spent 30 minutes   -preparing to see the patient by reviewing the electronic medical record as well as  -obtaining and/or reviewing separately obtained history,   -performing a medically appropriate examination and/or evaluation,   -ordering medication, tests or procedures and   -counseling and educating the patient/family/caregiver,   -referring and communicating with

## 2024-01-18 DIAGNOSIS — E28.39 PRIMARY OVARIAN INSUFFICIENCY: ICD-10-CM

## 2024-01-19 ENCOUNTER — PATIENT MESSAGE (OUTPATIENT)
Dept: FAMILY MEDICINE CLINIC | Age: 44
End: 2024-01-19

## 2024-01-19 DIAGNOSIS — M25.50 MULTIPLE JOINT PAIN: Primary | ICD-10-CM

## 2024-01-20 ENCOUNTER — PATIENT MESSAGE (OUTPATIENT)
Dept: FAMILY MEDICINE CLINIC | Age: 44
End: 2024-01-20

## 2024-01-22 DIAGNOSIS — L65.9 HAIR THINNING: Primary | ICD-10-CM

## 2024-01-22 NOTE — TELEPHONE ENCOUNTER
From: Shena Pacheco  To: Renee Valdez  Sent: 1/20/2024 11:41 PM EST  Subject: Hair loss question    Hello. Sorry to constantly bug you. I've been looking into hair loss treatments and came across an ad for a company called [employer]. After researching their page and the medicine, I realized the main ingredient is minoxidil. I checked my insurance and the foam and liquid is not covered, but the 2.5 mg and 10 mg tablets are covered. Could we try for at least a few months to see if they would help my hair thinning. It's gradually getting worse, especially at the back of my head and causes me to not want to wear my hair down anymore.

## 2024-01-22 NOTE — TELEPHONE ENCOUNTER
From: Shena Pacheco  To: Renee Valdez  Sent: 1/19/2024 11:14 PM EST  Subject: Eng result    Hello. I had an EMG earlier today on my wrists. Dr Hernandez told me both wrists were normal. After speaking with a good friend, she asked if I've had my inflammation markers checked. I was wondering if you could order that blood work or if Dr chacon would be the one to order that. My ankles have been swollen since 2017, we spoke about that before, and we couldn't find anything wrong through labs or a doppler and my friend said she also thought she had carpal tunnel, but found out it was actually inflammation pushing on her nerves causing the carpal tunnel symptoms.

## 2024-01-23 LAB
RHEUMATOID FACTOR: <10
SEDIMENTATION RATE, ERYTHROCYTE: 8

## 2024-01-24 DIAGNOSIS — M25.50 MULTIPLE JOINT PAIN: ICD-10-CM

## 2024-01-29 ENCOUNTER — TELEPHONE (OUTPATIENT)
Age: 44
End: 2024-01-29

## 2024-01-29 DIAGNOSIS — N39.0 RECURRENT UTI: Primary | ICD-10-CM

## 2024-01-29 DIAGNOSIS — M25.50 MULTIPLE JOINT PAIN: ICD-10-CM

## 2024-01-29 RX ORDER — NITROFURANTOIN 25; 75 MG/1; MG/1
100 CAPSULE ORAL 2 TIMES DAILY
Qty: 14 CAPSULE | Refills: 0 | Status: SHIPPED | OUTPATIENT
Start: 2024-01-29

## 2024-01-29 NOTE — TELEPHONE ENCOUNTER
Pt thinks she has an infection and is headed to [facility] lab for culture she would like something called in while she waits [id number] - [place], [place] - [address] - P [phone] - F [phone] [[id number]]

## 2024-01-29 NOTE — TELEPHONE ENCOUNTER
----- Message from Shena Pacheco sent at 1/29/2024  2:03 PM EST -----  Regarding: Urine culture   Contact: [phone]  Forgot to say I'm heading to the lab in a few : )

## 2024-01-30 ENCOUNTER — HOSPITAL ENCOUNTER (OUTPATIENT)
Age: 44
Discharge: HOME | End: 2024-01-30
Payer: COMMERCIAL

## 2024-01-30 ENCOUNTER — HOSPITAL ENCOUNTER (OUTPATIENT)
Dept: MAMMOGRAPHY | Age: 44
Discharge: HOME | End: 2024-02-01
Payer: COMMERCIAL

## 2024-01-30 VITALS — HEIGHT: 67 IN | WEIGHT: 173 LBS | BODY MASS INDEX: 27.15 KG/M2

## 2024-01-30 DIAGNOSIS — Z01.419 ENCOUNTER FOR WELL WOMAN EXAM WITH ROUTINE GYNECOLOGICAL EXAM: ICD-10-CM

## 2024-01-30 PROCEDURE — 77063 BREAST TOMOSYNTHESIS BI: CPT

## 2024-01-30 PROCEDURE — 87086 URINE CULTURE/COLONY COUNT: CPT

## 2024-01-30 PROCEDURE — 86403 PARTICLE AGGLUT ANTBDY SCRN: CPT

## 2024-01-30 PROCEDURE — 87186 SC STD MICRODIL/AGAR DIL: CPT

## 2024-02-01 ENCOUNTER — TELEPHONE (OUTPATIENT)
Age: 44
End: 2024-02-01

## 2024-02-01 LAB
MICROORGANISM SPEC CULT: ABNORMAL
SPECIMEN DESCRIPTION: ABNORMAL

## 2024-02-01 NOTE — TELEPHONE ENCOUNTER
----- Message from Kyle Dorantes MD sent at 2/1/2024 11:48 AM EST -----  Continue prescribed treatment with Macrobid for UTI; recheck urine C&S 7 days after completion.    ----- Message -----  From: Nevaeh Villa MA  Sent: 2/1/2024  10:09 AM EST  To: Kyle Dorantes MD

## 2024-02-05 RX ORDER — SEMAGLUTIDE 1 MG/.5ML
INJECTION, SOLUTION SUBCUTANEOUS
Qty: 6 ML | Refills: 0 | OUTPATIENT
Start: 2024-02-05

## 2024-02-06 ENCOUNTER — TELEPHONE (OUTPATIENT)
Dept: OBGYN CLINIC | Age: 44
End: 2024-02-06

## 2024-02-06 NOTE — TELEPHONE ENCOUNTER
GYN pt that you called today to give results for her lab work. I did read off the note you placed but you still wanted to speak with her but you were in a room with a pt.     Pt will wait on a return call.

## 2024-02-08 ENCOUNTER — TELEPHONE (OUTPATIENT)
Dept: OBGYN CLINIC | Age: 44
End: 2024-02-08

## 2024-02-08 NOTE — TELEPHONE ENCOUNTER
Pt called to speak w/ provider. Pt stated she does not smoke cigarettes however uses disposable vapes daily.       If this is not a conflict then pt is okay to move forward.

## 2024-02-12 DIAGNOSIS — N39.0 RECURRENT UTI: Primary | ICD-10-CM

## 2024-02-13 ENCOUNTER — HOSPITAL ENCOUNTER (OUTPATIENT)
Age: 44
Setting detail: SPECIMEN
Discharge: HOME | End: 2024-02-13

## 2024-02-13 DIAGNOSIS — N39.0 RECURRENT UTI: ICD-10-CM

## 2024-02-13 RX ORDER — PROGESTERONE 100 MG/1
100 CAPSULE ORAL DAILY
Qty: 90 CAPSULE | Refills: 3 | Status: CANCELLED | OUTPATIENT
Start: 2024-02-13 | End: 2025-02-07

## 2024-02-14 LAB
MICROORGANISM SPEC CULT: NORMAL
SPECIMEN DESCRIPTION: NORMAL

## 2024-02-18 RX ORDER — FLUTICASONE PROPIONATE 50 MCG
SPRAY, SUSPENSION (ML) NASAL
Qty: 16 G | Refills: 0 | Status: SHIPPED | OUTPATIENT
Start: 2024-02-18

## 2024-02-19 RX ORDER — IBUPROFEN 800 MG/1
TABLET ORAL
Qty: 30 TABLET | Refills: 0 | OUTPATIENT
Start: 2024-02-19

## 2024-02-19 RX ORDER — IBUPROFEN 800 MG/1
TABLET ORAL
Qty: 30 TABLET | Refills: 0 | Status: SHIPPED | OUTPATIENT
Start: 2024-02-19

## 2024-02-20 ENCOUNTER — PATIENT MESSAGE (OUTPATIENT)
Dept: OBGYN CLINIC | Age: 44
End: 2024-02-20

## 2024-02-20 RX ORDER — IBUPROFEN 800 MG/1
TABLET ORAL
Qty: 30 TABLET | Refills: 0 | OUTPATIENT
Start: 2024-02-20

## 2024-02-20 NOTE — PROGRESS NOTES
Discussed clinical scenario with Dr. Duke who assisted with the plan of care. Discussed with patient that hormone therapy is strongly recommended with early menopause up until age 51.  Estrogen supplementation protects heart and bone health.  I would really like her to start hormonal therapy medication for optimal health outcomes.     She does vape nicotine currently which is felt to be a contraindication to hormone therapy at the recommended dosages for POI. Shena states it is not necessarily that she is dependent on nicotine but she is in the habit of holding the vape pen and using it throughout the day.  She has tried to use nicotine free vapes in the past but did not like the available flavor options.  She is open to trying this again and will attempt to cease use of nicotine in her vape pen    Resources on smoking cessation given and advised follow-up in 2 to 3 months    Will plan to start estrogen therapy if and when she is able to cease nicotine use. Strongly encouraged her to consider major health benefits of estrogen therapy along with poor health outcomes associated with lack of hormone therapy    In the meantime, she is already taking calcium and vitamin d supplementation  Encouraged heart healthy lifestyle including regular physical activity and a healthy diet.     Shena is agreeable to the plan

## 2024-02-21 RX ORDER — SEMAGLUTIDE 1 MG/.5ML
1 INJECTION, SOLUTION SUBCUTANEOUS
Qty: 6 ML | Refills: 0 | Status: CANCELLED | OUTPATIENT
Start: 2024-02-21

## 2024-02-21 NOTE — TELEPHONE ENCOUNTER
Patient calling regarding PA for contrave, looks like PA was submitted last week but then closed? Patient is okay with going back on wegovy, she said when she took a break from that medication and took the Ali, she had some of the wegovy left over and tried that and it didn't make her sick, can you send a refill of wegovy over?

## 2024-02-21 NOTE — TELEPHONE ENCOUNTER
I believe contrave was approved? Please clarify since I just sent that earlier this month. She will need to choose either wegovy or contrave and continue on it consistently. Can't use both

## 2024-02-22 RX ORDER — SEMAGLUTIDE 1 MG/.5ML
1 INJECTION, SOLUTION SUBCUTANEOUS
Qty: 6 ML | Refills: 0 | Status: SHIPPED | OUTPATIENT
Start: 2024-02-22

## 2024-02-22 NOTE — TELEPHONE ENCOUNTER
Patient calling back, we haven't heard from the pharmacy regarding contrave -I dont think they approved it. For some reason the pa was closed with no explanation.    Patient is okay with wegovy now that she has taken a break--she states its been doing well but only has one week left, can you send refill of wegovy to mail order pharm?

## 2024-02-23 ENCOUNTER — TELEPHONE (OUTPATIENT)
Dept: OBGYN CLINIC | Age: 44
End: 2024-02-23

## 2024-02-23 NOTE — TELEPHONE ENCOUNTER
----- Message from SHREE Lennon CNP sent at 2/21/2024 10:20 AM EST -----  Discused labs confirm POI with Shena on the phone. Recommended nicotine cessation in order to start hormone supplementation    Please call to schedule follow up appointment in 2 months

## 2024-03-05 ENCOUNTER — OFFICE VISIT (OUTPATIENT)
Dept: FAMILY MEDICINE CLINIC | Age: 44
End: 2024-03-05
Payer: COMMERCIAL

## 2024-03-05 VITALS
HEIGHT: 67 IN | SYSTOLIC BLOOD PRESSURE: 102 MMHG | TEMPERATURE: 97.6 F | OXYGEN SATURATION: 98 % | BODY MASS INDEX: 27 KG/M2 | HEART RATE: 78 BPM | DIASTOLIC BLOOD PRESSURE: 60 MMHG | WEIGHT: 172 LBS

## 2024-03-05 DIAGNOSIS — E66.3 OVERWEIGHT: Primary | ICD-10-CM

## 2024-03-05 DIAGNOSIS — M25.50 MULTIPLE JOINT PAIN: ICD-10-CM

## 2024-03-05 PROCEDURE — 1036F TOBACCO NON-USER: CPT | Performed by: PHYSICIAN ASSISTANT

## 2024-03-05 PROCEDURE — G8484 FLU IMMUNIZE NO ADMIN: HCPCS | Performed by: PHYSICIAN ASSISTANT

## 2024-03-05 PROCEDURE — G8417 CALC BMI ABV UP PARAM F/U: HCPCS | Performed by: PHYSICIAN ASSISTANT

## 2024-03-05 PROCEDURE — 99213 OFFICE O/P EST LOW 20 MIN: CPT | Performed by: PHYSICIAN ASSISTANT

## 2024-03-05 PROCEDURE — G8427 DOCREV CUR MEDS BY ELIG CLIN: HCPCS | Performed by: PHYSICIAN ASSISTANT

## 2024-03-05 RX ORDER — MAGNESIUM OXIDE 400 MG/1
1 TABLET ORAL NIGHTLY
COMMUNITY
Start: 2024-02-12

## 2024-03-05 RX ORDER — CITALOPRAM HYDROBROMIDE 20 MG
TABLET ORAL
COMMUNITY
Start: 2024-02-02

## 2024-03-05 SDOH — ECONOMIC STABILITY: INCOME INSECURITY: HOW HARD IS IT FOR YOU TO PAY FOR THE VERY BASICS LIKE FOOD, HOUSING, MEDICAL CARE, AND HEATING?: NOT HARD AT ALL

## 2024-03-05 SDOH — ECONOMIC STABILITY: FOOD INSECURITY: WITHIN THE PAST 12 MONTHS, THE FOOD YOU BOUGHT JUST DIDN'T LAST AND YOU DIDN'T HAVE MONEY TO GET MORE.: NEVER TRUE

## 2024-03-05 SDOH — ECONOMIC STABILITY: FOOD INSECURITY: WITHIN THE PAST 12 MONTHS, YOU WORRIED THAT YOUR FOOD WOULD RUN OUT BEFORE YOU GOT MONEY TO BUY MORE.: NEVER TRUE

## 2024-03-05 ASSESSMENT — ENCOUNTER SYMPTOMS
WHEEZING: 0
COUGH: 0
COLOR CHANGE: 0
SHORTNESS OF BREATH: 0

## 2024-03-29 RX ORDER — FLUTICASONE PROPIONATE 50 MCG
SPRAY, SUSPENSION (ML) NASAL
Qty: 16 G | Refills: 0 | Status: SHIPPED | OUTPATIENT
Start: 2024-03-29

## 2024-04-18 ENCOUNTER — TELEPHONE (OUTPATIENT)
Dept: FAMILY MEDICINE CLINIC | Age: 44
End: 2024-04-18

## 2024-04-18 NOTE — TELEPHONE ENCOUNTER
----- Message from Mariaa Paige sent at 4/18/2024  1:55 PM EDT -----  Subject: Message to Provider    QUESTIONS  Information for Provider? Pt 's TAYLER was good only from Jan- March , she   has paperwork to extend it and was hoping to just drop it off, rather than   have an appt as she was in recently, please advise.   ---------------------------------------------------------------------------  --------------  CALL BACK INFO  [phone]; OK to leave message on voicemail  ---------------------------------------------------------------------------  --------------  SCRIPT ANSWERS  Relationship to Patient? Self

## 2024-04-25 NOTE — PROGRESS NOTES
DAY OF SURGERY/PROCEDURE  GUIDELINES    As a patient at the [facility], you can expect quality medical and nursing care that is centered on your individual needs. It is our goal to make your surgical experience as comfortable and excellent as possible.  ________________________________________________________________________    The following instructions are general guidelines, if any information on this sheet is different from what your doctor has instructed you to do, please follow your doctor's instructions.    Please arrive on 5/3 @ 915 am       Enter through entrance C. Check in at registration     Upon arrival you will be taken to the pre-operative area to get ready for surgery, your family will stay in the waiting room and visit with you once you are ready for surgery. Due to special limitations please limit visitation to 1-2 members of your family at a time. When it is time for surgery your family will return to the waiting room.    Clear liquids the day prior to sx.Nothing to drink, smoke, suck or chew after midnight (no water, gum, mints, cigarettes, cigars, pipes, snuff, chewing tobacco, etc.) or your surgery may be canceled.     Take a shower or bath on the morning of your surgery/procedure (Hibiclens if directed) Do not apply any lotions.    Brush your teeth, but do not swallow any water     IN CASE OF ILLNESS - If you have a cold or flu symptoms (high fever, runny nose, sore throat, cough, etc.) rash, nausea, vomiting, loose stools, and/or recent contact with someone who has a contagious disease (chick pox, measles, etc.) please call your doctor before coming to the surgery center    DO NOT take anticoagulants (blood thinners, aspirin or aspirin-containing products) as instructed by your physician. Stop wegovy 1 week prior to sx.    Leave all jewelry at home and wear loose, comfortable clothing that is easy to put on and take off.     If you will be returning home the same day as  your surgery, you will need to have a responsible adult (18 years of age or older) present to drive you home. You will need someone stay with you at home for the first 24 hours following your surgery. This is due to the anesthesia and the medication given to you during surgery and recovery.

## 2024-04-30 ENCOUNTER — OFFICE VISIT (OUTPATIENT)
Dept: OBGYN CLINIC | Age: 44
End: 2024-04-30
Payer: COMMERCIAL

## 2024-04-30 VITALS
DIASTOLIC BLOOD PRESSURE: 68 MMHG | HEART RATE: 70 BPM | BODY MASS INDEX: 26.59 KG/M2 | HEIGHT: 67 IN | WEIGHT: 169.4 LBS | SYSTOLIC BLOOD PRESSURE: 112 MMHG

## 2024-04-30 DIAGNOSIS — Z30.45 INITIAL ENCOUNTER FOR MANAGEMENT OF CONTRACEPTIVE PATCH USE: ICD-10-CM

## 2024-04-30 DIAGNOSIS — E28.39 PRIMARY OVARIAN INSUFFICIENCY: Primary | ICD-10-CM

## 2024-04-30 PROCEDURE — 99214 OFFICE O/P EST MOD 30 MIN: CPT

## 2024-04-30 RX ORDER — ESTRADIOL 0.1 MG/D
1 FILM, EXTENDED RELEASE TRANSDERMAL
Qty: 8 PATCH | Refills: 3 | Status: CANCELLED | OUTPATIENT
Start: 2024-05-02

## 2024-04-30 NOTE — PROGRESS NOTES
[facility]  [address]  [address]  [place] [address]  Dept: [phone]           Gynecologic problem visit    Patient: Shena Pacheco  Encounter date: [unfilled]   Primary Care Physician: Renee Valdez PA-C   Chief Complaint   Patient presents with    Follow-up     2 mos f/u on lab results, hormones off per patient      HPI: Shena Pacheco is a 44 y.o.  who presents today as a returning patient for evaluation of hormone therapy recommendations with primary ovarian insufficiency. She was diagnosed with POI in 2019. She was on OCPs for some time but these were stopped years ago because patient was a smoker.     Currently she has stopped vaping nicotine for over one month. Reports temperature fluctuations unsure if they are hot flashes. She has an Appt with rheumatology concern for autoimmune disease. She is currently working mandatory overtime and under a lot of stress at work. She is tearful today. Planning to complete genesight testing with PCP as she wonders if her current medication regimen is helping her mood enough.    Would like to start estrogen therapy as recommended for POI. Denies history of blood clots or any increased risk for these. Had a normal mammogram 2024, calculated tyrer-cuzick risk score for breast cancer was below average.     OBSTETRICAL & GYNECOLOGICAL HISTORY:  OB History    Para Term  AB Living   2 0 0 0 2 0   SAB IAB Ectopic Molar Multiple Live Births   2 0 0 0 0 0      # Outcome Date GA Lbr Mazin/2nd Weight Sex Delivery Anes PTL Lv   2 SAB            1 SAB              Patient's last menstrual period was 2023 (within days). She is not having periods for a little over one year    Sexually Active: with   Not preventing pregnancy, aware there is a small chance she could conceive.     FAMILY HISTORY:  family history includes High Cholesterol in her mother;

## 2024-05-02 ENCOUNTER — ANESTHESIA EVENT (OUTPATIENT)
Dept: OPERATING ROOM | Age: 44
End: 2024-05-02
Payer: COMMERCIAL

## 2024-05-03 ENCOUNTER — ANESTHESIA (OUTPATIENT)
Dept: OPERATING ROOM | Age: 44
End: 2024-05-03
Payer: COMMERCIAL

## 2024-05-03 ENCOUNTER — HOSPITAL ENCOUNTER (OUTPATIENT)
Age: 44
Setting detail: OUTPATIENT SURGERY
Discharge: HOME | End: 2024-05-03
Attending: SPECIALIST | Admitting: SPECIALIST
Payer: COMMERCIAL

## 2024-05-03 VITALS
HEART RATE: 39 BPM | OXYGEN SATURATION: 100 % | WEIGHT: 172.6 LBS | RESPIRATION RATE: 10 BRPM | SYSTOLIC BLOOD PRESSURE: 103 MMHG | HEIGHT: 67 IN | BODY MASS INDEX: 27.09 KG/M2 | TEMPERATURE: 97.3 F | DIASTOLIC BLOOD PRESSURE: 58 MMHG

## 2024-05-03 PROBLEM — N30.00 ACUTE CYSTITIS WITHOUT HEMATURIA: Status: RESOLVED | Noted: 2022-04-25 | Resolved: 2024-05-03

## 2024-05-03 LAB — HCG, PREGNANCY URINE (POC): NEGATIVE

## 2024-05-03 PROCEDURE — A4216 STERILE WATER/SALINE, 10 ML: HCPCS | Performed by: SPECIALIST

## 2024-05-03 PROCEDURE — 7100000010 HC PHASE II RECOVERY - FIRST 15 MIN: Performed by: SPECIALIST

## 2024-05-03 PROCEDURE — 3700000001 HC ADD 15 MINUTES (ANESTHESIA): Performed by: SPECIALIST

## 2024-05-03 PROCEDURE — 52287 CYSTOSCOPY CHEMODENERVATION: CPT | Performed by: SPECIALIST

## 2024-05-03 PROCEDURE — 2580000003 HC RX 258: Performed by: ANESTHESIOLOGY

## 2024-05-03 PROCEDURE — 3600000003 HC SURGERY LEVEL 3 BASE: Performed by: SPECIALIST

## 2024-05-03 PROCEDURE — 3700000000 HC ANESTHESIA ATTENDED CARE: Performed by: SPECIALIST

## 2024-05-03 PROCEDURE — 2500000003 HC RX 250 WO HCPCS: Performed by: NURSE ANESTHETIST, CERTIFIED REGISTERED

## 2024-05-03 PROCEDURE — 7100000011 HC PHASE II RECOVERY - ADDTL 15 MIN: Performed by: SPECIALIST

## 2024-05-03 PROCEDURE — 2580000003 HC RX 258: Performed by: SPECIALIST

## 2024-05-03 PROCEDURE — 81025 URINE PREGNANCY TEST: CPT

## 2024-05-03 PROCEDURE — 6360000002 HC RX W HCPCS: Performed by: SPECIALIST

## 2024-05-03 PROCEDURE — 3600000013 HC SURGERY LEVEL 3 ADDTL 15MIN: Performed by: SPECIALIST

## 2024-05-03 PROCEDURE — 6360000002 HC RX W HCPCS: Performed by: NURSE ANESTHETIST, CERTIFIED REGISTERED

## 2024-05-03 PROCEDURE — 2709999900 HC NON-CHARGEABLE SUPPLY: Performed by: SPECIALIST

## 2024-05-03 RX ORDER — SODIUM CHLORIDE 9 MG/ML
INJECTION, SOLUTION INTRAVENOUS PRN
Status: DISCONTINUED | OUTPATIENT
Start: 2024-05-03 | End: 2024-05-03 | Stop reason: HOSPADM

## 2024-05-03 RX ORDER — SODIUM CHLORIDE 9 MG/ML
INJECTION, SOLUTION INTRAMUSCULAR; INTRAVENOUS; SUBCUTANEOUS PRN
Status: DISCONTINUED | OUTPATIENT
Start: 2024-05-03 | End: 2024-05-03 | Stop reason: ALTCHOICE

## 2024-05-03 RX ORDER — FENTANYL CITRATE 50 UG/ML
INJECTION, SOLUTION INTRAMUSCULAR; INTRAVENOUS PRN
Status: DISCONTINUED | OUTPATIENT
Start: 2024-05-03 | End: 2024-05-03 | Stop reason: SDUPTHER

## 2024-05-03 RX ORDER — SODIUM CHLORIDE 0.9 % (FLUSH) 0.9 %
5-40 SYRINGE (ML) INJECTION EVERY 12 HOURS SCHEDULED
Status: DISCONTINUED | OUTPATIENT
Start: 2024-05-03 | End: 2024-05-03 | Stop reason: HOSPADM

## 2024-05-03 RX ORDER — SODIUM CHLORIDE 9 MG/ML
INJECTION, SOLUTION INTRAMUSCULAR; INTRAVENOUS; SUBCUTANEOUS
Status: DISCONTINUED
Start: 2024-05-03 | End: 2024-05-03 | Stop reason: HOSPADM

## 2024-05-03 RX ORDER — LABETALOL HYDROCHLORIDE 5 MG/ML
10 INJECTION, SOLUTION INTRAVENOUS
Status: DISCONTINUED | OUTPATIENT
Start: 2024-05-03 | End: 2024-05-03 | Stop reason: HOSPADM

## 2024-05-03 RX ORDER — CEFAZOLIN 2 G/1
INJECTION, POWDER, FOR SOLUTION INTRAMUSCULAR; INTRAVENOUS
Status: DISCONTINUED
Start: 2024-05-03 | End: 2024-05-03 | Stop reason: HOSPADM

## 2024-05-03 RX ORDER — NALOXONE HYDROCHLORIDE 0.4 MG/ML
INJECTION, SOLUTION INTRAMUSCULAR; INTRAVENOUS; SUBCUTANEOUS PRN
Status: DISCONTINUED | OUTPATIENT
Start: 2024-05-03 | End: 2024-05-03 | Stop reason: HOSPADM

## 2024-05-03 RX ORDER — NITROFURANTOIN 25; 75 MG/1; MG/1
100 CAPSULE ORAL 2 TIMES DAILY
Qty: 10 CAPSULE | Refills: 0 | Status: SHIPPED | OUTPATIENT
Start: 2024-05-03

## 2024-05-03 RX ORDER — SODIUM CHLORIDE, SODIUM LACTATE, POTASSIUM CHLORIDE, CALCIUM CHLORIDE 600; 310; 30; 20 MG/100ML; MG/100ML; MG/100ML; MG/100ML
INJECTION, SOLUTION INTRAVENOUS CONTINUOUS
Status: DISCONTINUED | OUTPATIENT
Start: 2024-05-03 | End: 2024-05-03 | Stop reason: HOSPADM

## 2024-05-03 RX ORDER — SEMAGLUTIDE 1 MG/.5ML
INJECTION, SOLUTION SUBCUTANEOUS
Qty: 6 ML | Refills: 0 | Status: SHIPPED | OUTPATIENT
Start: 2024-05-03

## 2024-05-03 RX ORDER — SODIUM CHLORIDE 9 MG/ML
INJECTION, SOLUTION INTRAVENOUS CONTINUOUS
Status: DISCONTINUED | OUTPATIENT
Start: 2024-05-03 | End: 2024-05-03 | Stop reason: HOSPADM

## 2024-05-03 RX ORDER — IPRATROPIUM BROMIDE AND ALBUTEROL SULFATE 2.5; .5 MG/3ML; MG/3ML
1 SOLUTION RESPIRATORY (INHALATION)
Status: DISCONTINUED | OUTPATIENT
Start: 2024-05-03 | End: 2024-05-03 | Stop reason: HOSPADM

## 2024-05-03 RX ORDER — ONDANSETRON 2 MG/ML
4 INJECTION INTRAMUSCULAR; INTRAVENOUS
Status: DISCONTINUED | OUTPATIENT
Start: 2024-05-03 | End: 2024-05-03 | Stop reason: HOSPADM

## 2024-05-03 RX ORDER — KETOROLAC TROMETHAMINE 30 MG/ML
INJECTION, SOLUTION INTRAMUSCULAR; INTRAVENOUS PRN
Status: DISCONTINUED | OUTPATIENT
Start: 2024-05-03 | End: 2024-05-03 | Stop reason: SDUPTHER

## 2024-05-03 RX ORDER — SODIUM CHLORIDE 0.9 % (FLUSH) 0.9 %
5-40 SYRINGE (ML) INJECTION PRN
Status: DISCONTINUED | OUTPATIENT
Start: 2024-05-03 | End: 2024-05-03 | Stop reason: HOSPADM

## 2024-05-03 RX ORDER — LIDOCAINE HYDROCHLORIDE 10 MG/ML
INJECTION, SOLUTION INFILTRATION; PERINEURAL PRN
Status: DISCONTINUED | OUTPATIENT
Start: 2024-05-03 | End: 2024-05-03 | Stop reason: SDUPTHER

## 2024-05-03 RX ORDER — METOCLOPRAMIDE HYDROCHLORIDE 5 MG/ML
10 INJECTION INTRAMUSCULAR; INTRAVENOUS
Status: DISCONTINUED | OUTPATIENT
Start: 2024-05-03 | End: 2024-05-03 | Stop reason: HOSPADM

## 2024-05-03 RX ORDER — MIDAZOLAM HYDROCHLORIDE 1 MG/ML
INJECTION INTRAMUSCULAR; INTRAVENOUS PRN
Status: DISCONTINUED | OUTPATIENT
Start: 2024-05-03 | End: 2024-05-03 | Stop reason: SDUPTHER

## 2024-05-03 RX ORDER — MORPHINE SULFATE 2 MG/ML
2 INJECTION, SOLUTION INTRAMUSCULAR; INTRAVENOUS EVERY 5 MIN PRN
Status: DISCONTINUED | OUTPATIENT
Start: 2024-05-03 | End: 2024-05-03 | Stop reason: HOSPADM

## 2024-05-03 RX ORDER — MEPERIDINE HYDROCHLORIDE 50 MG/ML
12.5 INJECTION INTRAMUSCULAR; INTRAVENOUS; SUBCUTANEOUS EVERY 5 MIN PRN
Status: DISCONTINUED | OUTPATIENT
Start: 2024-05-03 | End: 2024-05-03 | Stop reason: HOSPADM

## 2024-05-03 RX ORDER — HYDRALAZINE HYDROCHLORIDE 20 MG/ML
10 INJECTION INTRAMUSCULAR; INTRAVENOUS
Status: DISCONTINUED | OUTPATIENT
Start: 2024-05-03 | End: 2024-05-03 | Stop reason: HOSPADM

## 2024-05-03 RX ORDER — DIPHENHYDRAMINE HYDROCHLORIDE 50 MG/ML
12.5 INJECTION INTRAMUSCULAR; INTRAVENOUS
Status: DISCONTINUED | OUTPATIENT
Start: 2024-05-03 | End: 2024-05-03 | Stop reason: HOSPADM

## 2024-05-03 RX ORDER — MIDAZOLAM HYDROCHLORIDE 2 MG/2ML
2 INJECTION, SOLUTION INTRAMUSCULAR; INTRAVENOUS
Status: DISCONTINUED | OUTPATIENT
Start: 2024-05-03 | End: 2024-05-03 | Stop reason: HOSPADM

## 2024-05-03 RX ORDER — PROPOFOL 10 MG/ML
INJECTION, EMULSION INTRAVENOUS PRN
Status: DISCONTINUED | OUTPATIENT
Start: 2024-05-03 | End: 2024-05-03 | Stop reason: SDUPTHER

## 2024-05-03 RX ADMIN — FENTANYL CITRATE 25 MCG: 50 INJECTION, SOLUTION INTRAMUSCULAR; INTRAVENOUS at 11:20

## 2024-05-03 RX ADMIN — KETOROLAC TROMETHAMINE 30 MG: 30 INJECTION, SOLUTION INTRAMUSCULAR; INTRAVENOUS at 11:21

## 2024-05-03 RX ADMIN — PROPOFOL 50 MG: 10 INJECTION, EMULSION INTRAVENOUS at 11:11

## 2024-05-03 RX ADMIN — FENTANYL CITRATE 25 MCG: 50 INJECTION, SOLUTION INTRAMUSCULAR; INTRAVENOUS at 11:17

## 2024-05-03 RX ADMIN — PROPOFOL 100 MCG/KG/MIN: 10 INJECTION, EMULSION INTRAVENOUS at 11:12

## 2024-05-03 RX ADMIN — MIDAZOLAM 2 MG: 1 INJECTION INTRAMUSCULAR; INTRAVENOUS at 11:08

## 2024-05-03 RX ADMIN — CEFAZOLIN 2000 MG: 2 INJECTION, POWDER, FOR SOLUTION INTRAMUSCULAR; INTRAVENOUS at 11:12

## 2024-05-03 RX ADMIN — FENTANYL CITRATE 50 MCG: 50 INJECTION, SOLUTION INTRAMUSCULAR; INTRAVENOUS at 11:11

## 2024-05-03 RX ADMIN — SODIUM CHLORIDE, POTASSIUM CHLORIDE, SODIUM LACTATE AND CALCIUM CHLORIDE: 600; 310; 30; 20 INJECTION, SOLUTION INTRAVENOUS at 10:13

## 2024-05-03 RX ADMIN — LIDOCAINE HYDROCHLORIDE 40 MG: 10 INJECTION, SOLUTION INFILTRATION; PERINEURAL at 11:11

## 2024-05-03 ASSESSMENT — LIFESTYLE VARIABLES: SMOKING_STATUS: 1

## 2024-05-03 ASSESSMENT — PAIN - FUNCTIONAL ASSESSMENT
PAIN_FUNCTIONAL_ASSESSMENT: NONE - DENIES PAIN
PAIN_FUNCTIONAL_ASSESSMENT: NONE - DENIES PAIN

## 2024-05-03 NOTE — H&P
[facility]  Kyle Dorantes MD FACS    History and Physical    Patient:  Shena Pacheco  MRN: [id number]  YOB: 1980    CHIEF COMPLAINT:  Urge urinary incontinence     HISTORY OF PRESENT ILLNESS:   The patient is a 44 y.o. female who presents with:  Patient did well after 11/17/23 Cystoscopy and transvesical Botox (200 IU) Rx.  She is now using 0 pads per day.  Her Postvoid Residual today was 0 mL.  Patient here today for a Cystoscopy and transvesical Botox (200 IU) under MAC to maintain the clinical effect.   Lower urinary tract symptoms: urgency, frequency, hesitancy, decreased urinary stream, and nocturia, 1 times per night   Last AUA Symptom Score (QOL): 14 (2)  Today's AUA Symptom Score (QOL): 10 (2)     Summary of old records:   Frequency every 2 hours with occasional urgency, Urge urinary incontinence: 2 pops/d, tried trospium, ditropan and detrol wo success; 10/21/20 PVR = 0 mL; Myrbetriq 50 mg po qd, solifenacin (Vesicare) 5 mg qd (discussed trying 10 mg qd 2/24/21); switch to Gemtesa (vibegron) 75 mg qd 5/4/23; 9/7/23 Botox 100 IU-only lasted 2 months; 11/17/23 Botox 200 IU  11/24/20 VD: LIC=601 mL, YNI=5477 mL/d, TV=8.33 voids/d, DIL=197 mL/void, Nx0, NPi=26%, NUP=40 mL/hr  MALI: referred to PT 5/4/23  3/9/20 CT neg  Back surgery for sciatica, L5-S1 foraminotomy 11/9/20 (Meenu)  Recurrent UTI\"s: 8/8/22 Serratia; 4/18/23, 5/1/23 Salmonella-Bactrim bid x 10 day then qd x 1 month    Patient's old records, notes and chart reviewed and summarized above.    Past Medical History:    Past Medical History:   Diagnosis Date    ADD (attention deficit disorder)     Allergic rhinitis     Anxiety     Back pain     Body piercing     several,instructed to remove.will remove tongue/nose    Cancer (HCC) Cervical cancer, neuroendocrine tumor 2020    Carpal tunnel syndrome, bilateral 05/19/2016    Chronic back pain     started pain mgmnt 2/218    Depression     Foot pain     left (d/t

## 2024-05-03 NOTE — ANESTHESIA POSTPROCEDURE EVALUATION
Department of Anesthesiology  Postprocedure Note    Patient: Shena Pacheco  MRN: [id number]  YOB: 1980  Date of evaluation: 5/3/2024    Procedure Summary       Date: 05/03/24 Room / Location: [address]    Anesthesia Start: 1108 Anesthesia Stop: 1129    Procedure: CYSTOSCOPY INJECTION BOTOX 200 UNITS Diagnosis:       Urge urinary incontinence      (Urge urinary incontinence [N39.41])    Surgeons: Kyle Dorantes MD Responsible Provider: Shena Ulrich MD    Anesthesia Type: MAC ASA Status: 3            Anesthesia Type: No value filed.    Reva Phase I: Reva Score: 10    Reva Phase II: Reva Score: 10    Anesthesia Post Evaluation    Patient location during evaluation: PACU  Patient participation: complete - patient participated  Level of consciousness: awake and awake and alert  Pain score: 0  Nausea & Vomiting: no nausea and no vomiting  Cardiovascular status: hemodynamically stable and blood pressure returned to baseline  Respiratory status: acceptable  Hydration status: euvolemic  Multimodal analgesia pain management approach  Pain management: adequate    No notable events documented.

## 2024-05-03 NOTE — DISCHARGE INSTRUCTIONS
Encourage patient to drink at least 64 ounces of water a day.  Reassure patient it is common to see gross hematuria and this can last a few days.  No driving for 24 hours.  Jarad Dorantes M.D.'s office will reach out to patient to set up a follow up  in 3 weeks to reassess symptoms and check Postvoid Residual .  Call office if fever > 101, shaking chills, inability to void.  See Rx for antibiotics.  Patient may use OTC Azo 1 po tid prn bladder pain or burning with urination.

## 2024-05-03 NOTE — ANESTHESIA PRE PROCEDURE
9.9 02/14/2022 10:45 AM    BILITOT 0.31 02/14/2022 10:45 AM    ALKPHOS 75 02/14/2022 10:45 AM    AST 20 02/14/2022 10:45 AM    ALT 18 02/14/2022 10:45 AM       POC Tests: No results for input(s): \"POCGLU\", \"POCNA\", \"POCK\", \"POCCL\", \"POCBUN\", \"POCHEMO\", \"POCHCT\" in the last 72 hours.    Coags:   Lab Results   Component Value Date/Time    PROTIME 9.5 03/26/2018 08:08 AM    INR 0.9 03/26/2018 08:08 AM    APTT 24.2 03/26/2018 08:08 AM       HCG (If Applicable):   Lab Results   Component Value Date    PREGTESTUR negative 03/09/2020    HCGQUANT <1 10/08/2019        ABGs: No results found for: \"PHART\", \"PO2ART\", \"ZTS1DUI\", \"FBA6TTD\", \"BEART\", \"H6JPKBZJ\"     Type & Screen (If Applicable):  No results found for: \"LABABO\"    Drug/Infectious Status (If Applicable):  No results found for: \"HIV\", \"HEPCAB\"    COVID-19 Screening (If Applicable):   Lab Results   Component Value Date/Time    COVID19 Not Detected 05/26/2020 05:01 PM           Anesthesia Evaluation  Patient summary reviewed and Nursing notes reviewed  Airway: Mallampati: II  TM distance: >3 FB   Neck ROM: full  Mouth opening: > = 3 FB   Dental: normal exam         Pulmonary:normal exam    (+)           current smoker                           Cardiovascular:  Exercise tolerance: good (>4 METS)                    Neuro/Psych:   (+) neuromuscular disease:, psychiatric history: stable with treatment            GI/Hepatic/Renal:   (+) PUD          Endo/Other:                     Abdominal: normal exam            Vascular:          Other Findings: Nightly marijuana use       Anesthesia Plan      MAC     ASA 3       Induction: intravenous.    MIPS: Postoperative opioids intended.  Anesthetic plan and risks discussed with patient.      Plan discussed with CRNA.                Shena Ulrich MD   5/3/2024

## 2024-05-03 NOTE — OP NOTE
Operative Note      Patient: Shena Pacheco  YOB: 1980  MRN: [id number]    Date of Procedure: 5/3/2024    Pre-Op Diagnosis Codes:     * Urge urinary incontinence [N39.41]    Post-Op Diagnosis: Same       Procedure(s):  CYSTOSCOPY INJECTION BOTOX 200 UNITS    Surgeon(s):  Kyle Dorantes MD    Assistant:   * No surgical staff found *    Anesthesia: Monitor Anesthesia Care    Estimated Blood Loss (mL): Minimal    Complications: None    Specimens:   * No specimens in log *    Implants:  * No implants in log *      Drains: * No LDAs found *    Findings:  Infection Present At Time Of Surgery (PATOS) (choose all levels that have infection present):  No infection present  Other Findings: see below     Detailed Description of Procedure:   NDICATIONS:  This is a 44 y.o. female presents today for a cystoscopy and transvesical Botox injection to treat medically refractory urge urinary incontinence.  After risks, benefits and alternatives of the procedure were discussed with the patient, informed consent was obtained and the patient elected to proceed.  The patient was given Ancef 2 gm IV on call to OR for antibiotic prophylaxis. Patient had EPC cuffs placed for VTE prophylaxis.    DETAILS OF PROCEDURE:  The patient was brought back to the operating room.  She was prepped and draped in usual sterile surgical fashion after being placed in dorsal lithotomy position.  A timeout was taken per protocol with everyone in agreement. The 22F rigid cystoscope was assembled using a 30 degree lens and passed per the urethra. The urethra was normal without any lesions. The bladder was entered with ease and inspected thoroughly and systematically which did not show any lesions or tumors, stone, fistulous tracts, diverticula. Both ureteral orifices were normal with clear efflux of urine. Once within the bladder the injection needle was advanced and purged of air.  A total of 200 I.U. units of Botox were injected into the

## 2024-05-06 DIAGNOSIS — F41.9 ANXIETY: ICD-10-CM

## 2024-05-07 RX ORDER — NORELGESTROMIN AND ETHINYL ESTRADIOL 35; 150 UG/MG; UG/MG
1 PATCH TRANSDERMAL WEEKLY
Qty: 3 PATCH | Refills: 3 | Status: SHIPPED | OUTPATIENT
Start: 2024-05-07

## 2024-06-12 ENCOUNTER — HOSPITAL ENCOUNTER (OUTPATIENT)
Age: 44
Setting detail: SPECIMEN
Discharge: HOME | End: 2024-06-12

## 2024-06-12 ENCOUNTER — TELEPHONE (OUTPATIENT)
Age: 44
End: 2024-06-12

## 2024-06-12 DIAGNOSIS — N30.00 ACUTE CYSTITIS WITHOUT HEMATURIA: ICD-10-CM

## 2024-06-12 RX ORDER — SULFAMETHOXAZOLE AND TRIMETHOPRIM 800; 160 MG/1; MG/1
1 TABLET ORAL 2 TIMES DAILY
Qty: 14 TABLET | Refills: 0 | Status: SHIPPED | OUTPATIENT
Start: 2024-06-12 | End: 2024-06-14 | Stop reason: ALTCHOICE

## 2024-06-12 NOTE — TELEPHONE ENCOUNTER
PT CALLED TO LET YOU KNOW THAT SHE JUST DROPPED OFF A SPECIMEN FOR C&S AT THE LAB BECAUSE SHE THINKS SHE HAS ANOTHER UTI- SHE WOULD LIKE AN ATB PENDING RESULTS-PLEASE ADVISE-LEANDRO

## 2024-06-14 LAB
MICROORGANISM SPEC CULT: ABNORMAL
SPECIMEN DESCRIPTION: ABNORMAL

## 2024-06-14 RX ORDER — NITROFURANTOIN 25; 75 MG/1; MG/1
100 CAPSULE ORAL 2 TIMES DAILY
Qty: 14 CAPSULE | Refills: 0 | Status: SHIPPED | OUTPATIENT
Start: 2024-06-14

## 2024-06-17 ENCOUNTER — OFFICE VISIT (OUTPATIENT)
Age: 44
End: 2024-06-17
Payer: COMMERCIAL

## 2024-06-17 VITALS — BODY MASS INDEX: 27 KG/M2 | WEIGHT: 172 LBS | HEIGHT: 67 IN

## 2024-06-17 DIAGNOSIS — N39.0 ENTEROCOCCUS UTI: ICD-10-CM

## 2024-06-17 DIAGNOSIS — N39.41 URGE INCONTINENCE: Primary | ICD-10-CM

## 2024-06-17 DIAGNOSIS — N39.0 RECURRENT UTI: Primary | ICD-10-CM

## 2024-06-17 DIAGNOSIS — B95.2 ENTEROCOCCUS UTI: ICD-10-CM

## 2024-06-17 DIAGNOSIS — R35.0 URINARY FREQUENCY: ICD-10-CM

## 2024-06-17 LAB
BILIRUBIN, POC: NORMAL
BLOOD URINE, POC: NORMAL
CLARITY, POC: CLEAR
COLOR, POC: YELLOW
GLUCOSE URINE, POC: NORMAL
KETONES, POC: NORMAL
LEUKOCYTE EST, POC: NORMAL
NITRITE, POC: NORMAL
PH, POC: NORMAL
POST VOID RESIDUAL (PVR): 64 ML
PROTEIN, POC: NORMAL
SPECIFIC GRAVITY, POC: NORMAL
UROBILINOGEN, POC: NORMAL

## 2024-06-17 PROCEDURE — 99214 OFFICE O/P EST MOD 30 MIN: CPT | Performed by: SPECIALIST

## 2024-06-17 PROCEDURE — 81003 URINALYSIS AUTO W/O SCOPE: CPT | Performed by: SPECIALIST

## 2024-06-17 PROCEDURE — 51798 US URINE CAPACITY MEASURE: CPT | Performed by: SPECIALIST

## 2024-06-17 RX ORDER — BUSPIRONE HYDROCHLORIDE 10 MG/1
10 TABLET ORAL 3 TIMES DAILY
COMMUNITY
Start: 2024-06-11

## 2024-06-17 RX ORDER — FLUOXETINE HYDROCHLORIDE 20 MG/1
CAPSULE ORAL
COMMUNITY
Start: 2024-05-11

## 2024-06-17 RX ORDER — CLONAZEPAM 0.5 MG/1
TABLET ORAL
COMMUNITY
Start: 2024-05-27

## 2024-06-17 RX ORDER — VENLAFAXINE HYDROCHLORIDE 75 MG/1
CAPSULE, EXTENDED RELEASE ORAL
COMMUNITY
Start: 2024-06-11

## 2024-06-17 NOTE — PROGRESS NOTES
Kyle Dorantes MD [facility] Office Progress Note    Patient:  Shena Pacheco  YOB: 1980  Date: 6/17/2024    HISTORY OF PRESENT ILLNESS:   The patient is a 44 y.o. female  Patient doing well after 5/3/24 Cystoscopy and transvesical Botox (200 IU) Rx.  She is now using 0 pads per day.  Her Postvoid Residual today was 14 mL.  Will plan to repeat a Cystoscopy and transvesical Botox (200 IU) in early November 2024 to maintain the clinical effect.   Patient feeling now that she is taking Macrobid for her Enterococcal UTI.  Lower urinary tract symptoms: urgency, frequency, hesitancy, decreased urinary stream, nocturia, 2 times per night, and incomplete emptying and straining.   Last AUA Symptom Score (QOL): 10 (2)  Today's AUA Symptom Score (QOL): 17 (5)    Summary of old records:   Frequency every 2 hours with occasional urgency, Urge urinary incontinence: 2 pops/d, tried trospium, ditropan and detrol wo success; 10/21/20 PVR = 0 mL; Myrbetriq 50 mg po qd, solifenacin (Vesicare) 5 mg qd (discussed trying 10 mg qd 2/24/21); switch to Gemtesa (vibegron) 75 mg qd 5/4/23; 9/7/23 Botox 100 IU-only lasted 2 months; 11/17/23, 5/3/24 Botox 200 IU  11/24/20 VD: UQV=858 mL, VVF=8056 mL/d, TV=8.33 voids/d, DRH=086 mL/void, Nx0, NPi=26%, NUP=40 mL/hr  MALI: referred to PT 5/4/23  3/9/20 CT neg  Back surgery for sciatica, L5-S1 foraminotomy 11/9/20 (Meenu)  Recurrent UTI\"s: 8/8/22 Serratia; 4/18/23, 5/1/23 Salmonella-Bactrim bid x 10 day then qd x 1 month; 1/30/24 Staph aureus; 6/12/24 Enterococcus       Additional History: none    Procedures Today: Postvoid Residual:  Post-void Residual by bladder scanner: 14 mL      Urinalysis today:  Results for POC orders placed in visit on 06/17/24   POCT Urinalysis No Micro (Auto)   Result Value Ref Range    Color, UA yellow     Clarity, UA clear     Glucose, UA POC neg     Bilirubin, UA      Ketones, UA      Spec Grav, UA      Blood, UA POC neg

## 2024-06-18 ENCOUNTER — TELEPHONE (OUTPATIENT)
Dept: FAMILY MEDICINE CLINIC | Age: 44
End: 2024-06-18

## 2024-06-22 ENCOUNTER — APPOINTMENT (OUTPATIENT)
Dept: CT IMAGING | Age: 44
End: 2024-06-22
Payer: COMMERCIAL

## 2024-06-22 ENCOUNTER — PATIENT MESSAGE (OUTPATIENT)
Dept: FAMILY MEDICINE CLINIC | Age: 44
End: 2024-06-22

## 2024-06-22 ENCOUNTER — APPOINTMENT (OUTPATIENT)
Dept: GENERAL RADIOLOGY | Age: 44
End: 2024-06-22
Payer: COMMERCIAL

## 2024-06-22 ENCOUNTER — HOSPITAL ENCOUNTER (EMERGENCY)
Age: 44
Discharge: HOME | End: 2024-06-22
Attending: STUDENT IN AN ORGANIZED HEALTH CARE EDUCATION/TRAINING PROGRAM
Payer: COMMERCIAL

## 2024-06-22 VITALS
SYSTOLIC BLOOD PRESSURE: 106 MMHG | WEIGHT: 165 LBS | HEART RATE: 56 BPM | RESPIRATION RATE: 17 BRPM | BODY MASS INDEX: 25.84 KG/M2 | DIASTOLIC BLOOD PRESSURE: 54 MMHG | OXYGEN SATURATION: 97 % | TEMPERATURE: 98.1 F

## 2024-06-22 DIAGNOSIS — R55 SYNCOPE, UNSPECIFIED SYNCOPE TYPE: Primary | ICD-10-CM

## 2024-06-22 DIAGNOSIS — R55 SYNCOPE AND COLLAPSE: Primary | ICD-10-CM

## 2024-06-22 LAB
ANION GAP SERPL CALCULATED.3IONS-SCNC: 10 MMOL/L (ref 9–17)
BASOPHILS # BLD: <0.03 K/UL (ref 0–0.2)
BASOPHILS NFR BLD: 0 % (ref 0–2)
BNP SERPL-MCNC: 143 PG/ML
BUN SERPL-MCNC: 13 MG/DL (ref 6–20)
BUN/CREAT SERPL: 26 (ref 9–20)
CALCIUM SERPL-MCNC: 8.9 MG/DL (ref 8.6–10.4)
CHLORIDE SERPL-SCNC: 103 MMOL/L (ref 98–107)
CHP ED QC CHECK: NORMAL
CO2 SERPL-SCNC: 28 MMOL/L (ref 20–31)
CREAT SERPL-MCNC: 0.5 MG/DL (ref 0.5–0.9)
EKG ATRIAL RATE: 51 BPM
EKG P AXIS: 100 DEGREES
EKG P-R INTERVAL: 220 MS
EKG Q-T INTERVAL: 436 MS
EKG QRS DURATION: 82 MS
EKG QTC CALCULATION (BAZETT): 401 MS
EKG R AXIS: 88 DEGREES
EKG T AXIS: 107 DEGREES
EKG VENTRICULAR RATE: 51 BPM
EOSINOPHIL # BLD: 0.13 K/UL (ref 0–0.44)
EOSINOPHILS RELATIVE PERCENT: 2 % (ref 1–4)
ERYTHROCYTE [DISTWIDTH] IN BLOOD BY AUTOMATED COUNT: 11.9 % (ref 11.8–14.4)
GFR, ESTIMATED: >90 ML/MIN/1.73M2
GLUCOSE SERPL-MCNC: 84 MG/DL (ref 70–99)
HCT VFR BLD AUTO: 39.1 % (ref 36.3–47.1)
HGB BLD-MCNC: 12.7 G/DL (ref 11.9–15.1)
IMM GRANULOCYTES # BLD AUTO: 0.01 K/UL (ref 0–0.3)
IMM GRANULOCYTES NFR BLD: 0 %
LYMPHOCYTES NFR BLD: 2.34 K/UL (ref 1.1–3.7)
LYMPHOCYTES RELATIVE PERCENT: 38 % (ref 24–43)
MCH RBC QN AUTO: 29.6 PG (ref 25.2–33.5)
MCHC RBC AUTO-ENTMCNC: 32.5 G/DL (ref 28.4–34.8)
MCV RBC AUTO: 91.1 FL (ref 82.6–102.9)
MONOCYTES NFR BLD: 0.48 K/UL (ref 0.1–1.2)
MONOCYTES NFR BLD: 8 % (ref 3–12)
NEUTROPHILS NFR BLD: 52 % (ref 36–65)
NEUTS SEG NFR BLD: 3.21 K/UL (ref 1.5–8.1)
NRBC BLD-RTO: 0 PER 100 WBC
PLATELET # BLD AUTO: 207 K/UL (ref 138–453)
PMV BLD AUTO: 9.9 FL (ref 8.1–13.5)
POTASSIUM SERPL-SCNC: 3.5 MMOL/L (ref 3.7–5.3)
PREGNANCY TEST URINE, POC: NEGATIVE
RBC # BLD AUTO: 4.29 M/UL (ref 3.95–5.11)
SODIUM SERPL-SCNC: 141 MMOL/L (ref 135–144)
T3FREE SERPL-MCNC: 2.9 PG/ML (ref 2–4.4)
T4 FREE SERPL-MCNC: 1.1 NG/DL (ref 0.9–1.7)
TROPONIN I SERPL HS-MCNC: <6 NG/L (ref 0–14)
WBC OTHER # BLD: 6.2 K/UL (ref 3.5–11.3)

## 2024-06-22 PROCEDURE — 80048 BASIC METABOLIC PNL TOTAL CA: CPT

## 2024-06-22 PROCEDURE — 93005 ELECTROCARDIOGRAM TRACING: CPT | Performed by: STUDENT IN AN ORGANIZED HEALTH CARE EDUCATION/TRAINING PROGRAM

## 2024-06-22 PROCEDURE — 99285 EMERGENCY DEPT VISIT HI MDM: CPT

## 2024-06-22 PROCEDURE — 84439 ASSAY OF FREE THYROXINE: CPT

## 2024-06-22 PROCEDURE — 83880 ASSAY OF NATRIURETIC PEPTIDE: CPT

## 2024-06-22 PROCEDURE — 70498 CT ANGIOGRAPHY NECK: CPT

## 2024-06-22 PROCEDURE — 71045 X-RAY EXAM CHEST 1 VIEW: CPT

## 2024-06-22 PROCEDURE — 84484 ASSAY OF TROPONIN QUANT: CPT

## 2024-06-22 PROCEDURE — 2580000003 HC RX 258: Performed by: STUDENT IN AN ORGANIZED HEALTH CARE EDUCATION/TRAINING PROGRAM

## 2024-06-22 PROCEDURE — 84481 FREE ASSAY (FT-3): CPT

## 2024-06-22 PROCEDURE — 6360000004 HC RX CONTRAST MEDICATION: Performed by: STUDENT IN AN ORGANIZED HEALTH CARE EDUCATION/TRAINING PROGRAM

## 2024-06-22 PROCEDURE — 70450 CT HEAD/BRAIN W/O DYE: CPT

## 2024-06-22 PROCEDURE — 85025 COMPLETE CBC W/AUTO DIFF WBC: CPT

## 2024-06-22 RX ORDER — 0.9 % SODIUM CHLORIDE 0.9 %
80 INTRAVENOUS SOLUTION INTRAVENOUS ONCE
Status: COMPLETED | OUTPATIENT
Start: 2024-06-22 | End: 2024-06-22

## 2024-06-22 RX ORDER — 0.9 % SODIUM CHLORIDE 0.9 %
1000 INTRAVENOUS SOLUTION INTRAVENOUS ONCE
Status: COMPLETED | OUTPATIENT
Start: 2024-06-22 | End: 2024-06-22

## 2024-06-22 RX ORDER — SODIUM CHLORIDE 0.9 % (FLUSH) 0.9 %
10 SYRINGE (ML) INJECTION PRN
Status: DISCONTINUED | OUTPATIENT
Start: 2024-06-22 | End: 2024-06-22 | Stop reason: HOSPADM

## 2024-06-22 RX ADMIN — IOPAMIDOL 75 ML: 755 INJECTION, SOLUTION INTRAVENOUS at 06:00

## 2024-06-22 RX ADMIN — SODIUM CHLORIDE 80 ML: 9 INJECTION, SOLUTION INTRAVENOUS at 06:01

## 2024-06-22 RX ADMIN — SODIUM CHLORIDE, PRESERVATIVE FREE 10 ML: 5 INJECTION INTRAVENOUS at 06:01

## 2024-06-22 RX ADMIN — SODIUM CHLORIDE 1000 ML: 9 INJECTION, SOLUTION INTRAVENOUS at 04:14

## 2024-06-22 NOTE — ED PROVIDER NOTES
[facility] EMERGENCY DEPARTMENT ENCOUNTER      Pt Name: Shena Pacheco  MRN: [id number]  Birthdate 1980  Date of evaluation: 6/22/24    CHIEF COMPLAINT       Chief Complaint   Patient presents with    Loss of Consciousness     Px arrives complaining of LOC on night of 6/20. Px states she did hit head and has edema/bruising on R side of forehead. Px states she has had a headache since       HISTORY OF PRESENT ILLNESS   Shena Pacheco is a 44 y.o. female who presents with syncope and collapse.  She states that her  was out of town she smoked some marijuana which she typically smokes from the same dispensary that she typically receives it from.  She reports that she was ambulating had minimal with any prodromal symptoms states she did feel slightly lightheaded and the next thing she remembers she woke up facedown on the floor.  She denies any chest pain, shortness of breath prodromal symptoms.  When she told her  about this a day later he recommended she seek evaluation.  Does have a significant medical history reportedly had neuroendocrine tumor removed during endoscopy and has had multiple PET scans which she reported been negative.  She reports that history of a right leg superficial blood clot but was never on anticoagulation in which repeat ultrasound was negative.  Reports no previous cardiac issues.  Does report some visual issues however she  has been following with ophthalmology and she reportedly has a macular issue which they are treating.  She states she will quite quickly and felt back to normal besides a headache.    PASTMEDICAL HISTORY     Past Medical History:   Diagnosis Date    ADD (attention deficit disorder)     Allergic rhinitis     Anxiety     Back pain     Body piercing     several,instructed to remove.will remove tongue/nose    Cancer (HCC) Cervical cancer, neuroendocrine tumor 2020    Carpal tunnel syndrome, bilateral 05/19/2016    Chronic back pain     started pain mgmnt 2/218

## 2024-06-22 NOTE — ED PROVIDER NOTES
[facility] ED  EMERGENCY DEPARTMENT ENCOUNTER   ATTENDING ATTESTATION     Pt Name: Shena Pacheco  MRN: [id number]  Birthdate 1980  Date of evaluation: 6/22/24       Shena Pacheco is a 44 y.o. female who presents with Loss of Consciousness (Px arrives complaining of LOC on night of 6/20. Px states she did hit head and has edema/bruising on R side of forehead. Px states she has had a headache since)      MDM:     SO Dr Newton    44-year-old female, awaiting CT and CTA.    8:04 AM EDT  Results normal, patient will be discharged home with outpatient follow-up.    Impression: Syncope    Vitals:   Vitals:    06/22/24 0515 06/22/24 0522 06/22/24 0528 06/22/24 0630   BP: (!) 110/58  (!) 101/51 (!) 96/59   Pulse: 53 51 (!) 49 (!) 48   Resp: 16 17 17 16   Temp:       TempSrc:       SpO2: 99% 99% 99% 99%   Weight:             Jose Luis Hare MD  Attending Emergency  Physician                  Jose Luis Hare MD  06/22/24 0805

## 2024-06-24 NOTE — PROGRESS NOTES
[facility]  [address]  [address]  [place] [address]  Dept: [phone]           Gynecologic problem visit    Patient: Shena Pacheco  Primary Care Physician: Renee Valdez PA-C   Chief Complaint   Patient presents with    Follow-up     HPI: Shena Pacheco is a 44 y.o.  who presents today as a returning patient for evaluation of POI treatment.  Last seen at this clinic  and was prescribed the birth control patch in order to supplement hormone levels in the setting of early menopause.  Shena switched to a nicotine free vape in order to be able to more safely use the birth control patch.  Due to severe mood symptoms she did not feel well while taking estrogen. After about 2 weeks of use, discontinued the medication.  She notes that she did have a period after two weeks of using the patch.  She bled for 3-4 days.    She does have multiple health issues she is being treated for at this time. Seeing neurosurgeon and planning for Eye surgery. She Passed out a week ago before work. Went to er - abnormal EKG showed 1st degree av block. Had full workup. Following up with cardiologist for echo. Following with Rheumatologist     Mom has osteoporosis on fosamax.  Per recent labs Shena's Vit d level is sufficient.  She is currently taking calcium with vit d.     Regarding mood she is working with a new psychiatrist and with some medication adjustments and extra support she does feel her stress level is being well-managed and she is coping well with life stresses.  Denies mood concerns today    OBSTETRICAL & GYNECOLOGICAL HISTORY:  OB History    Para Term  AB Living   2 0 0 0 2 0   SAB IAB Ectopic Molar Multiple Live Births   2 0 0 0 0 0      # Outcome Date GA Lbr Mazin/2nd Weight Sex Delivery Anes PTL Lv   2 SAB            1 SAB              Patient's last menstrual period was 2023 (within

## 2024-06-24 NOTE — TELEPHONE ENCOUNTER
From: Shena Pacheco  To: Renee TRAVIS Carranzamery  Sent: 6/22/2024 9:20 AM EDT  Subject: Test needed    Hello! I just left [facility] ER after having passed out Thursday morning. My labs and head/neck CT came back normal, but my EKG shows 1st degree av block. The Dr recommended me to see Dr. Xiao at [facility]. If you could please put in a referral. They also want me to get a heart echo, but said it would be in my best interest to get it sooner rather than later, so they suggested asking you to put in the order vs waiting to get in to the cardiologist and then getting one. I'm not sure exactly where they're done, so preferably [facility] or [facility] if possible please.

## 2024-06-25 ENCOUNTER — OFFICE VISIT (OUTPATIENT)
Dept: OBGYN CLINIC | Age: 44
End: 2024-06-25
Payer: COMMERCIAL

## 2024-06-25 VITALS
HEART RATE: 58 BPM | BODY MASS INDEX: 26.34 KG/M2 | SYSTOLIC BLOOD PRESSURE: 96 MMHG | HEIGHT: 67 IN | WEIGHT: 167.8 LBS | DIASTOLIC BLOOD PRESSURE: 58 MMHG

## 2024-06-25 DIAGNOSIS — E28.39 PRIMARY OVARIAN INSUFFICIENCY: Primary | ICD-10-CM

## 2024-06-25 DIAGNOSIS — Z30.45 ENCOUNTER FOR SURVEILLANCE OF TRANSDERMAL PATCH HORMONAL CONTRACEPTIVE DEVICE: ICD-10-CM

## 2024-06-25 PROCEDURE — 99214 OFFICE O/P EST MOD 30 MIN: CPT

## 2024-07-02 ENCOUNTER — HOSPITAL ENCOUNTER (OUTPATIENT)
Age: 44
Setting detail: SPECIMEN
Discharge: HOME | End: 2024-07-02

## 2024-07-02 DIAGNOSIS — N30.00 ACUTE CYSTITIS WITHOUT HEMATURIA: ICD-10-CM

## 2024-07-03 RX ORDER — FLUTICASONE PROPIONATE 50 MCG
SPRAY, SUSPENSION (ML) NASAL
Qty: 16 G | Refills: 0 | Status: SHIPPED | OUTPATIENT
Start: 2024-07-03

## 2024-07-04 LAB
MICROORGANISM SPEC CULT: ABNORMAL
SPECIMEN DESCRIPTION: ABNORMAL

## 2024-07-05 RX ORDER — LEVOFLOXACIN 500 MG/1
500 TABLET, FILM COATED ORAL DAILY
Qty: 7 TABLET | Refills: 0 | Status: SHIPPED | OUTPATIENT
Start: 2024-07-05

## 2024-07-08 RX ORDER — FLUTICASONE PROPIONATE 50 MCG
SPRAY, SUSPENSION (ML) NASAL
Qty: 16 G | Refills: 0 | Status: SHIPPED | OUTPATIENT
Start: 2024-07-08

## 2024-07-11 ENCOUNTER — HOSPITAL ENCOUNTER (OUTPATIENT)
Age: 44
Discharge: HOME | End: 2024-07-13
Payer: COMMERCIAL

## 2024-07-11 DIAGNOSIS — R55 SYNCOPE, UNSPECIFIED SYNCOPE TYPE: ICD-10-CM

## 2024-07-11 LAB
ECHO AO ROOT DIAM: 2.4 CM
ECHO AV PEAK GRADIENT: 4 MMHG
ECHO AV PEAK VELOCITY: 1 M/S
ECHO LA AREA 4C: 13.6 CM2
ECHO LA DIAMETER: 2.7 CM
ECHO LA MAJOR AXIS: 4.8 CM
ECHO LA TO AORTIC ROOT RATIO: 1.13
ECHO LA VOL MOD A4C: 30 ML (ref 22–52)
ECHO LV E' LATERAL VELOCITY: 11 CM/S
ECHO LV E' SEPTAL VELOCITY: 9 CM/S
ECHO LV FRACTIONAL SHORTENING: 30 % (ref 28–44)
ECHO LV INTERNAL DIMENSION DIASTOLIC: 4.4 CM (ref 3.9–5.3)
ECHO LV INTERNAL DIMENSION SYSTOLIC: 3.1 CM
ECHO LV IVSD: 0.8 CM (ref 0.6–0.9)
ECHO LV MASS 2D: 109.4 G (ref 67–162)
ECHO LV POSTERIOR WALL DIASTOLIC: 0.8 CM (ref 0.6–0.9)
ECHO LV RELATIVE WALL THICKNESS RATIO: 0.36
ECHO MV A VELOCITY: 0.41 M/S
ECHO MV E DECELERATION TIME (DT): 165 MS
ECHO MV E VELOCITY: 0.74 M/S
ECHO MV E/A RATIO: 1.8
ECHO MV E/E' LATERAL: 6.73
ECHO MV E/E' RATIO (AVERAGED): 7.47
ECHO MV E/E' SEPTAL: 8.22

## 2024-07-11 PROCEDURE — 93306 TTE W/DOPPLER COMPLETE: CPT

## 2024-07-11 PROCEDURE — 93306 TTE W/DOPPLER COMPLETE: CPT | Performed by: INTERNAL MEDICINE

## 2024-07-11 ASSESSMENT — ENCOUNTER SYMPTOMS
ABDOMINAL DISTENTION: 0
ABDOMINAL PAIN: 0
SHORTNESS OF BREATH: 0
CONSTIPATION: 0
ANAL BLEEDING: 0
BLOOD IN STOOL: 0

## 2024-07-18 ENCOUNTER — HOSPITAL ENCOUNTER (OUTPATIENT)
Age: 44
Setting detail: SPECIMEN
Discharge: HOME | End: 2024-07-18

## 2024-07-18 ENCOUNTER — OFFICE VISIT (OUTPATIENT)
Dept: FAMILY MEDICINE CLINIC | Age: 44
End: 2024-07-18
Payer: COMMERCIAL

## 2024-07-18 VITALS
HEIGHT: 67 IN | HEART RATE: 61 BPM | OXYGEN SATURATION: 99 % | WEIGHT: 171 LBS | TEMPERATURE: 97.2 F | SYSTOLIC BLOOD PRESSURE: 98 MMHG | BODY MASS INDEX: 26.84 KG/M2 | DIASTOLIC BLOOD PRESSURE: 60 MMHG

## 2024-07-18 DIAGNOSIS — R79.89 ELEVATED TSH: ICD-10-CM

## 2024-07-18 DIAGNOSIS — I44.0 AV BLOCK, 1ST DEGREE: ICD-10-CM

## 2024-07-18 DIAGNOSIS — R09.81 SINUS CONGESTION: ICD-10-CM

## 2024-07-18 DIAGNOSIS — M25.532 LEFT WRIST PAIN: ICD-10-CM

## 2024-07-18 DIAGNOSIS — K59.00 CONSTIPATION, UNSPECIFIED CONSTIPATION TYPE: ICD-10-CM

## 2024-07-18 DIAGNOSIS — E66.3 OVERWEIGHT (BMI 25.0-29.9): Primary | ICD-10-CM

## 2024-07-18 LAB — TSH SERPL DL<=0.05 MIU/L-ACNC: 1.47 UIU/ML (ref 0.27–4.2)

## 2024-07-18 PROCEDURE — 99214 OFFICE O/P EST MOD 30 MIN: CPT | Performed by: PHYSICIAN ASSISTANT

## 2024-07-18 RX ORDER — AZELASTINE 1 MG/ML
1 SPRAY, METERED NASAL 2 TIMES DAILY
Qty: 60 ML | Refills: 1 | Status: SHIPPED | OUTPATIENT
Start: 2024-07-18

## 2024-07-18 RX ORDER — VORTIOXETINE 10 MG/1
10 TABLET, FILM COATED ORAL DAILY
COMMUNITY
Start: 2024-07-14

## 2024-07-18 RX ORDER — BUPROPION HYDROCHLORIDE 150 MG/1
150 TABLET ORAL EVERY MORNING
COMMUNITY
Start: 2024-07-17

## 2024-07-18 RX ORDER — KETOROLAC TROMETHAMINE 4 MG/ML
SOLUTION/ DROPS OPHTHALMIC 4 TIMES DAILY
COMMUNITY

## 2024-07-18 RX ORDER — ESZOPICLONE 3 MG/1
3 TABLET, FILM COATED ORAL NIGHTLY
COMMUNITY
Start: 2024-07-09

## 2024-07-18 ASSESSMENT — ENCOUNTER SYMPTOMS
NAUSEA: 0
VOMITING: 0
ABDOMINAL PAIN: 0
COLOR CHANGE: 0
CONSTIPATION: 1
SHORTNESS OF BREATH: 0
DIARRHEA: 0

## 2024-07-18 NOTE — PROGRESS NOTES
Visit Information    Have you changed or started any medications since your last visit including any over-the-counter medicines, vitamins, or herbal medicines? no   Are you having any side effects from any of your medications? -  no  Have you stopped taking any of your medications? Is so, why? -  no    Have you seen any other physician or provider since your last visit? No  Have you had any other diagnostic tests since your last visit? No  Have you been seen in the emergency room and/or had an admission to a hospital since we last saw you? No  Have you had your routine dental cleaning in the past 6 months? no    Have you activated your [email or web address] account? If not, what are your barriers? Yes     Patient Care Team:  Renee Valdez PA-C as PCP - General (Family Medicine)  Renee Valdez PA-C as PCP - Empaneled Provider  Kyle Dorantes MD as Consulting Physician (Urology)    Medical History Review  Past Medical, Family, and Social History reviewed and  contribute to the patient presenting condition    Health Maintenance   Topic Date Due    Hepatitis B vaccine (1 of 3 - 3-dose series) Never done    Varicella vaccine (1 of 2 - 2-dose childhood series) Never done    Hepatitis C screen  Never done    COVID-19 Vaccine (2 - 2023-24 season) 09/01/2023    Flu vaccine (1) 08/01/2024    Depression Screen  01/05/2025    Breast cancer screen  01/30/2026    Cervical cancer screen  10/13/2026    Lipids  02/14/2027    DTaP/Tdap/Td vaccine (2 - Td or Tdap) 08/28/2028    HIV screen  Completed    Hepatitis A vaccine  Aged Out    Hib vaccine  Aged Out    HPV vaccine  Aged Out    Polio vaccine  Aged Out    Meningococcal (ACWY) vaccine  Aged Out    Pneumococcal 0-64 years Vaccine  Aged Out    Diabetes screen  Discontinued       
normal.         Behavior: Behavior normal.         Thought Content: Thought content normal.         Judgment: Judgment normal.       BP 98/60 (Site: Left Upper Arm, Position: Sitting, Cuff Size: Large Adult)   Pulse 61   Temp 97.2 °F (36.2 °C) (Tympanic)   Ht 1.702 m (5' 7\")   Wt 77.6 kg (171 lb)   LMP 01/01/2023 (Within Days)   SpO2 99%   BMI 26.78 kg/m²     Assessment:       Diagnosis Orders   1. Overweight (BMI 25.0-29.9)        2. Left wrist pain        3. Constipation, unspecified constipation type        4. AV block, 1st degree        5. Elevated TSH  TSH with Reflex    Thyroid Antibodies      6. Sinus congestion  azelastine (ASTELIN) 0.1 % nasal spray                Plan:   Assessment & Plan   Return in about 3 months (around 10/18/2024).    Patient is doing well with GLP1 injection for weight loss. Weight maintaining around 170. Encouraged healthy diet and regular walking if able  Constipation reviewed as possible side effect. Patient has had this for years however so many not be med. Patient is managing this well with prn stool softener    Cont with ortho and cardiology as planned  Patient agreed with treatment plan      Patient given educational materials - see patient instructions.Discussed use, benefit, and side effects of prescribed medications.  All patientquestions answered. Pt voiced understanding. Reviewed health maintenance.  Instructedto continue current medications, diet and exercise.  Patient agreed with treatmentplan. Follow up as directed.       Please note that this chart was generated using voice recognition Dragon dictation software.  Although every effort was made to ensure the accuracy of this automated transcription, some errors in transcription may have occurred.     Electronically signed by CHRIS WHITAKER PA-C on 7/18/2024 at 2:25 PM

## 2024-07-19 ENCOUNTER — HOSPITAL ENCOUNTER (OUTPATIENT)
Age: 44
Setting detail: SPECIMEN
Discharge: HOME | End: 2024-07-19

## 2024-07-19 DIAGNOSIS — N30.00 ACUTE CYSTITIS WITHOUT HEMATURIA: ICD-10-CM

## 2024-07-19 LAB
THYROGLOBULIN AB: <12 IU/ML (ref 0–40)
THYROPEROXIDASE AB SERPL IA-ACNC: <4 IU/ML (ref 0–25)

## 2024-07-20 LAB
MICROORGANISM SPEC CULT: NORMAL
SPECIMEN DESCRIPTION: NORMAL

## 2024-07-21 RX ORDER — SEMAGLUTIDE 1 MG/.5ML
INJECTION, SOLUTION SUBCUTANEOUS
Qty: 6 ML | Refills: 0 | Status: SHIPPED | OUTPATIENT
Start: 2024-07-21

## 2024-08-05 ENCOUNTER — PATIENT MESSAGE (OUTPATIENT)
Dept: FAMILY MEDICINE CLINIC | Age: 44
End: 2024-08-05

## 2024-08-05 RX ORDER — SEMAGLUTIDE 1 MG/.5ML
INJECTION, SOLUTION SUBCUTANEOUS
Qty: 6 ML | Refills: 0 | Status: SHIPPED | OUTPATIENT
Start: 2024-08-05

## 2024-08-05 NOTE — PROGRESS NOTES
Lumbago     OAB (overactive bladder) 7/12/2017    Obesity (BMI 30-39. 9)     Sciatica     Urge incontinence       Past Surgical History:   Procedure Laterality Date    BACK SURGERY  09/19/2016    Lumbar interbody fusion posterior, L5-S1    BACK SURGERY  04/23/2018    LUMBAR MICRO DISKECTOMY   L4-5    DILATION AND CURETTAGE OF UTERUS      LUMBAR FUSION  06/25/2018    OTHER SURGICAL HISTORY  03/26/2018    myelogram    DE LAMINECTOMY,>2 SGMT,LUMBAR N/A 4/23/2018    LUMBAR MICRO DISKECTOMY   L45 performed by Krzysztof Prieto MD at [address] INTRBDY N/A 6/25/2018    LUMBAR INTERBODY FUSION POSTERIOR L45 performed by Krzysztof Prieto MD at [address] EXTRACTION         Family History   Problem Relation Age of Onset    High Cholesterol Mother     Hypertension Father     Diabetes Neg Hx     Cancer Neg Hx           Social History     Tobacco Use    Smoking status: Former Smoker     Packs/day: 1.00     Years: 15.00     Pack years: 15.00     Types: Cigarettes     Last attempt to quit: 7/13/2016     Years since quitting: 3.1    Smokeless tobacco: Never Used   Substance Use Topics    Alcohol use: Yes     Types: 1 Glasses of wine per week     Comment: Maybe a couple drinks a month         Current Outpatient Medications   Medication Sig Dispense Refill    gabapentin (NEURONTIN) 100 MG capsule Take 100 mg by mouth 3 times daily.  amphetamine-dextroamphetamine (ADDERALL) 20 MG tablet Take 1 tablet by mouth 2 times daily for 30 days. 60 tablet 0    fluticasone (FLONASE) 50 MCG/ACT nasal spray INHALE 2 SPRAYS NASALLY TWO TIMES A DAY  16 g 3    ibuprofen (ADVIL;MOTRIN) 800 MG tablet TAKE 1 TABLET BY MOUTH EVERY SIX HOURS WITH FOOD or milk 120 tablet 1    trospium (SANCTURA) 20 MG tablet TAKE 1 TABLET TWICE A  tablet 3    triamcinolone (KENALOG) 0.1 % cream Apply topically 2 times daily.  60 g 0    vitamin B-12 (CYANOCOBALAMIN) 1000 MCG tablet Take 1,000 mcg by mouth daily      Multiple Vitamins-Minerals (THERAPEUTIC MULTIVITAMIN-MINERALS) tablet Take 1 tablet by mouth daily      diphenhydrAMINE (BENADRYL) 25 MG tablet Take 25 mg by mouth every 6 hours as needed for Itching      Sennosides-Docusate Sodium (STOOL SOFTENER LAXATIVE PO) Take 1 tablet by mouth 2 times daily as needed       Lactobacillus (PROBIOTIC ACIDOPHILUS PO) Take 1 tablet by mouth daily       No current facility-administered medications for this visit. No Known Allergies    Health Maintenance   Topic Date Due    Varicella Vaccine (1 of 2 - 13+ 2-dose series) 03/22/1994    Flu vaccine (1) 09/01/2019    Cervical cancer screen  10/11/2022    DTaP/Tdap/Td vaccine (2 - Td) 08/28/2028    HIV screen  Completed    Pneumococcal 0-64 years Vaccine  Aged Out       Subjective:     Review of Systems   Constitutional: Negative for activity change, appetite change, fatigue, fever and unexpected weight change. /74   Pulse 82   Ht 5' 7\" (1.702 m)   Wt 195 lb 12.8 oz (88.8 kg)   SpO2 99%   BMI 30.67 kg/m²    Eyes: Negative for photophobia, pain and visual disturbance. Respiratory: Negative for cough, shortness of breath and wheezing. Cardiovascular: Negative for chest pain and palpitations. Gastrointestinal: Negative for abdominal pain, constipation, diarrhea, nausea and vomiting. Genitourinary: Negative for dysuria. Skin: Negative for color change, pallor, rash and wound. Neurological: Positive for headaches (occasional). Negative for weakness. Hematological: Negative for adenopathy. Psychiatric/Behavioral: Positive for decreased concentration. Negative for self-injury, sleep disturbance and suicidal ideas. The patient is not nervous/anxious. Objective:     Physical Exam   Constitutional: She is oriented to person, place, and time. She appears well-developed and well-nourished. Cardiovascular: Normal rate and regular rhythm. Pulmonary/Chest: Effort normal and breath sounds normal. No respiratory distress. She has no wheezes. She has no rales. Neurological: She is alert and oriented to person, place, and time. Skin: Skin is warm and dry. No rash noted. No erythema. No pallor. Psychiatric: She has a normal mood and affect. Her behavior is normal. Judgment and thought content normal.   Nursing note and vitals reviewed. /74   Pulse 82   Ht 5' 7\" (1.702 m)   Wt 195 lb 12.8 oz (88.8 kg)   SpO2 99%   BMI 30.67 kg/m²     Assessment:          Diagnosis Orders   1. Attention deficit disorder, unspecified hyperactivity presence  Urine Drug Screen    amphetamine-dextroamphetamine (ADDERALL) 20 MG tablet   2. Light sensitivity               Plan:      Return in about 3 months (around 11/29/2019) for med review. Refill adderall in place of her psychiatrist due to cost  Update drug screen and controlled substance contract done today  Recheck in 3 mo sooner prn  Patient agreed with treatment plan  rx for car window tinting given  Cont with eye doctor  Follow up to discuss HAs further if persisting/worsening  Patient agreed with plan    Controlled Substance Monitoring:    Acute and Chronic Pain Monitoring:   RX Monitoring 8/29/2019   Attestation -   Periodic Controlled Substance Monitoring No signs of potential drug abuse or diversion identified. ;Random urine drug screen sent today. Patient given educational materials - see patient instructions. Discussed use, benefit, and side effects of prescribed medications. All patientquestions answered. Pt voiced understanding. Reviewed health maintenance. Instructedto continue current medications, diet and exercise. Patient agreed with treatmentplan. Follow up as directed.      Electronically signed by Margie Boyle PA-C on 8/29/2019 at 3:50 PM Yes

## 2024-08-05 NOTE — TELEPHONE ENCOUNTER
From: Shena Pacheco  To: Renee Valdez  Sent: 8/5/2024 2:38 PM EDT  Subject: Wegovy refill    Hello. [facility] is no longer refilling any of the weight loss injections. Could you please send in a refill to the [facility] for wegovy 1mg? I have 3 shots left.

## 2024-08-08 ENCOUNTER — PATIENT MESSAGE (OUTPATIENT)
Dept: FAMILY MEDICINE CLINIC | Age: 44
End: 2024-08-08

## 2024-08-08 DIAGNOSIS — M54.2 NECK PAIN: Primary | ICD-10-CM

## 2024-08-09 NOTE — TELEPHONE ENCOUNTER
From: Shena Pacheco  To: Renee Valdez  Sent: 8/8/2024 9:36 PM EDT  Subject: Neck pain    Hello. I forgot to mention this at my last appointment or any of my dr appointments since I had passed out because it never bothers me during my appointments, of course. Ever since I passed out, I've noticed it's harder to turn/tilt my head to the left. It almost feels like it needs to be popped back into place maybe or an adjustment. So I wanted to ask you first if you think I should get an xray or try a chiropractor maybe.

## 2024-08-12 ENCOUNTER — HOSPITAL ENCOUNTER (OUTPATIENT)
Age: 44
Setting detail: SPECIMEN
Discharge: HOME | End: 2024-08-12

## 2024-08-12 ENCOUNTER — TELEPHONE (OUTPATIENT)
Age: 44
End: 2024-08-12

## 2024-08-12 ENCOUNTER — HOSPITAL ENCOUNTER (OUTPATIENT)
Dept: GENERAL RADIOLOGY | Facility: CLINIC | Age: 44
Discharge: HOME | End: 2024-08-14
Payer: COMMERCIAL

## 2024-08-12 ENCOUNTER — HOSPITAL ENCOUNTER (OUTPATIENT)
Facility: CLINIC | Age: 44
Discharge: HOME | End: 2024-08-14
Payer: COMMERCIAL

## 2024-08-12 DIAGNOSIS — M54.2 NECK PAIN: ICD-10-CM

## 2024-08-12 DIAGNOSIS — N39.0 RECURRENT UTI: ICD-10-CM

## 2024-08-12 PROCEDURE — 72040 X-RAY EXAM NECK SPINE 2-3 VW: CPT

## 2024-08-13 LAB
MICROORGANISM SPEC CULT: NORMAL
SERVICE CMNT-IMP: NORMAL
SPECIMEN DESCRIPTION: NORMAL

## 2024-08-14 ENCOUNTER — TELEPHONE (OUTPATIENT)
Age: 44
End: 2024-08-14

## 2024-09-08 RX ORDER — IBUPROFEN 800 MG/1
TABLET, FILM COATED ORAL
Qty: 30 TABLET | Refills: 0 | Status: SHIPPED | OUTPATIENT
Start: 2024-09-08

## 2024-09-17 ENCOUNTER — TELEPHONE (OUTPATIENT)
Dept: FAMILY MEDICINE CLINIC | Age: 44
End: 2024-09-17

## 2024-09-23 ENCOUNTER — OFFICE VISIT (OUTPATIENT)
Age: 44
End: 2024-09-23
Payer: COMMERCIAL

## 2024-09-23 VITALS — BODY MASS INDEX: 26.84 KG/M2 | HEIGHT: 67 IN | WEIGHT: 171 LBS

## 2024-09-23 DIAGNOSIS — R35.0 URINARY FREQUENCY: ICD-10-CM

## 2024-09-23 DIAGNOSIS — N39.3 STRESS INCONTINENCE IN FEMALE: ICD-10-CM

## 2024-09-23 DIAGNOSIS — N39.41 URGE INCONTINENCE: Primary | ICD-10-CM

## 2024-09-23 LAB
BILIRUBIN, POC: NORMAL
BLOOD URINE, POC: NORMAL
CLARITY, POC: CLEAR
COLOR, POC: YELLOW
GLUCOSE URINE, POC: NORMAL MG/DL
KETONES, POC: NORMAL
LEUKOCYTE EST, POC: NORMAL
NITRITE, POC: NORMAL
PH, POC: NORMAL
PROTEIN, POC: NORMAL MG/DL
SPECIFIC GRAVITY, POC: NORMAL
UROBILINOGEN, POC: NORMAL

## 2024-09-23 PROCEDURE — 81003 URINALYSIS AUTO W/O SCOPE: CPT | Performed by: SPECIALIST

## 2024-09-23 PROCEDURE — 99214 OFFICE O/P EST MOD 30 MIN: CPT | Performed by: SPECIALIST

## 2024-10-15 ENCOUNTER — HOSPITAL ENCOUNTER (OUTPATIENT)
Age: 44
Setting detail: SPECIMEN
Discharge: HOME | End: 2024-10-15

## 2024-10-15 LAB — MAGNESIUM SERPL-MCNC: 2.5 MG/DL (ref 1.6–2.6)

## 2024-10-15 NOTE — PROGRESS NOTES
DAY OF SURGERY/PROCEDURE  GUIDELINES    As a patient at the [facility], you can expect quality medical and nursing care that is centered on your individual needs. It is our goal to make your surgical experience as comfortable and excellent as possible.  ________________________________________________________________________    The following instructions are general guidelines, if any information on this sheet is different from what your doctor has instructed you to do, please follow your doctor's instructions.    Please arrive on 10/18/2024 @ 0740      Enter through entrance C. Check in at registration     Upon arrival you will be taken to the pre-operative area to get ready for surgery, your family will stay in the waiting room and visit with you once you are ready for surgery. Due to special limitations please limit visitation to 1-2 members of your family at a time. When it is time for surgery your family will return to the waiting room.    Nothing to eat, drink, smoke, suck or chew after midnight (no water, gum, mints, cigarettes, cigars, pipes, snuff, chewing tobacco, etc.) or your surgery may be canceled.     Take a shower or bath on the morning of your surgery/procedure (Hibiclens if directed) Do not apply any lotions.    Brush your teeth, but do not swallow any water    IN CASE OF ILLNESS - If you have a cold or flu symptoms (high fever, runny nose, sore throat, cough, etc.) rash, nausea, vomiting, loose stools, and/or recent contact with someone who has a contagious disease (chick pox, measles, etc.) please call your doctor before coming to the surgery center    Take a small sip of water with heart, blood pressure, and/or seizure medication the morning of surgery.     If applicable bring your:  Inhaler (s)  Hearing aid(s)  Eyeglasses and Case (If you wear contacts they have to be removed before surgery, bring case and solution)  CPAP     DO NOT take anticoagulants (blood thinners,

## 2024-10-17 ENCOUNTER — ANESTHESIA EVENT (OUTPATIENT)
Dept: OPERATING ROOM | Age: 44
End: 2024-10-17
Payer: COMMERCIAL

## 2024-10-18 ENCOUNTER — APPOINTMENT (OUTPATIENT)
Dept: GENERAL RADIOLOGY | Age: 44
End: 2024-10-18
Attending: SPECIALIST
Payer: COMMERCIAL

## 2024-10-18 ENCOUNTER — HOSPITAL ENCOUNTER (OUTPATIENT)
Age: 44
Setting detail: OUTPATIENT SURGERY
Discharge: HOME | End: 2024-10-18
Attending: SPECIALIST | Admitting: SPECIALIST
Payer: COMMERCIAL

## 2024-10-18 ENCOUNTER — ANESTHESIA (OUTPATIENT)
Dept: OPERATING ROOM | Age: 44
End: 2024-10-18
Payer: COMMERCIAL

## 2024-10-18 VITALS
RESPIRATION RATE: 17 BRPM | SYSTOLIC BLOOD PRESSURE: 95 MMHG | DIASTOLIC BLOOD PRESSURE: 59 MMHG | OXYGEN SATURATION: 95 % | TEMPERATURE: 98 F | HEIGHT: 67 IN | BODY MASS INDEX: 26.18 KG/M2 | WEIGHT: 166.8 LBS | HEART RATE: 65 BPM

## 2024-10-18 DIAGNOSIS — G89.18 POST-OP PAIN: Primary | ICD-10-CM

## 2024-10-18 LAB — HCG, PREGNANCY URINE (POC): NEGATIVE

## 2024-10-18 PROCEDURE — C1883 ADAPT/EXT, PACING/NEURO LEAD: HCPCS | Performed by: SPECIALIST

## 2024-10-18 PROCEDURE — 2500000003 HC RX 250 WO HCPCS: Performed by: SPECIALIST

## 2024-10-18 PROCEDURE — 2709999900 HC NON-CHARGEABLE SUPPLY: Performed by: SPECIALIST

## 2024-10-18 PROCEDURE — 7100000010 HC PHASE II RECOVERY - FIRST 15 MIN: Performed by: SPECIALIST

## 2024-10-18 PROCEDURE — 81025 URINE PREGNANCY TEST: CPT

## 2024-10-18 PROCEDURE — 6360000002 HC RX W HCPCS: Performed by: SPECIALIST

## 2024-10-18 PROCEDURE — C1778 LEAD, NEUROSTIMULATOR: HCPCS | Performed by: SPECIALIST

## 2024-10-18 PROCEDURE — 7100000011 HC PHASE II RECOVERY - ADDTL 15 MIN: Performed by: SPECIALIST

## 2024-10-18 PROCEDURE — 64561 IMPLANT NEUROELECTRODES: CPT | Performed by: SPECIALIST

## 2024-10-18 PROCEDURE — 6370000000 HC RX 637 (ALT 250 FOR IP)

## 2024-10-18 PROCEDURE — 3600000002 HC SURGERY LEVEL 2 BASE: Performed by: SPECIALIST

## 2024-10-18 PROCEDURE — 3700000001 HC ADD 15 MINUTES (ANESTHESIA): Performed by: SPECIALIST

## 2024-10-18 PROCEDURE — 2580000003 HC RX 258: Performed by: ANESTHESIOLOGY

## 2024-10-18 PROCEDURE — 3700000000 HC ANESTHESIA ATTENDED CARE: Performed by: SPECIALIST

## 2024-10-18 PROCEDURE — 3600000012 HC SURGERY LEVEL 2 ADDTL 15MIN: Performed by: SPECIALIST

## 2024-10-18 PROCEDURE — 2720000010 HC SURG SUPPLY STERILE: Performed by: SPECIALIST

## 2024-10-18 DEVICE — LEAD NERVE STIM 4.32 MM INTERSTIM SURESCAN: Type: IMPLANTABLE DEVICE | Site: BACK | Status: FUNCTIONAL

## 2024-10-18 RX ORDER — SODIUM CHLORIDE 0.9 % (FLUSH) 0.9 %
5-40 SYRINGE (ML) INJECTION EVERY 12 HOURS SCHEDULED
Status: DISCONTINUED | OUTPATIENT
Start: 2024-10-18 | End: 2024-10-18 | Stop reason: HOSPADM

## 2024-10-18 RX ORDER — NALOXONE HYDROCHLORIDE 0.4 MG/ML
INJECTION, SOLUTION INTRAMUSCULAR; INTRAVENOUS; SUBCUTANEOUS PRN
Status: DISCONTINUED | OUTPATIENT
Start: 2024-10-18 | End: 2024-10-18 | Stop reason: HOSPADM

## 2024-10-18 RX ORDER — MIDAZOLAM HYDROCHLORIDE 1 MG/ML
INJECTION INTRAMUSCULAR; INTRAVENOUS
Status: DISCONTINUED | OUTPATIENT
Start: 2024-10-18 | End: 2024-10-18 | Stop reason: SDUPTHER

## 2024-10-18 RX ORDER — GENTAMICIN SULFATE 80 MG/50ML
80 INJECTION, SOLUTION INTRAVENOUS
Status: COMPLETED | OUTPATIENT
Start: 2024-10-18 | End: 2024-10-18

## 2024-10-18 RX ORDER — SODIUM CHLORIDE 9 MG/ML
INJECTION, SOLUTION INTRAVENOUS PRN
Status: DISCONTINUED | OUTPATIENT
Start: 2024-10-18 | End: 2024-10-18 | Stop reason: HOSPADM

## 2024-10-18 RX ORDER — SODIUM CHLORIDE 0.9 % (FLUSH) 0.9 %
5-40 SYRINGE (ML) INJECTION PRN
Status: DISCONTINUED | OUTPATIENT
Start: 2024-10-18 | End: 2024-10-18 | Stop reason: HOSPADM

## 2024-10-18 RX ORDER — SODIUM CHLORIDE, SODIUM LACTATE, POTASSIUM CHLORIDE, CALCIUM CHLORIDE 600; 310; 30; 20 MG/100ML; MG/100ML; MG/100ML; MG/100ML
INJECTION, SOLUTION INTRAVENOUS CONTINUOUS
Status: DISCONTINUED | OUTPATIENT
Start: 2024-10-18 | End: 2024-10-18 | Stop reason: HOSPADM

## 2024-10-18 RX ORDER — LABETALOL HYDROCHLORIDE 5 MG/ML
10 INJECTION, SOLUTION INTRAVENOUS
Status: DISCONTINUED | OUTPATIENT
Start: 2024-10-18 | End: 2024-10-18 | Stop reason: HOSPADM

## 2024-10-18 RX ORDER — CEPHALEXIN 500 MG/1
500 CAPSULE ORAL 3 TIMES DAILY
Qty: 15 CAPSULE | Refills: 0 | Status: SHIPPED | OUTPATIENT
Start: 2024-10-18

## 2024-10-18 RX ORDER — FENTANYL CITRATE 50 UG/ML
INJECTION, SOLUTION INTRAMUSCULAR; INTRAVENOUS
Status: DISCONTINUED | OUTPATIENT
Start: 2024-10-18 | End: 2024-10-18 | Stop reason: SDUPTHER

## 2024-10-18 RX ORDER — GENTAMICIN 40 MG/ML
INJECTION, SOLUTION INTRAMUSCULAR; INTRAVENOUS
Status: DISCONTINUED
Start: 2024-10-18 | End: 2024-10-18 | Stop reason: HOSPADM

## 2024-10-18 RX ORDER — ACETAMINOPHEN 500 MG
1000 TABLET ORAL ONCE
Status: COMPLETED | OUTPATIENT
Start: 2024-10-18 | End: 2024-10-18

## 2024-10-18 RX ORDER — HYDRALAZINE HYDROCHLORIDE 20 MG/ML
10 INJECTION INTRAMUSCULAR; INTRAVENOUS
Status: DISCONTINUED | OUTPATIENT
Start: 2024-10-18 | End: 2024-10-18 | Stop reason: HOSPADM

## 2024-10-18 RX ORDER — ACETAMINOPHEN 500 MG
TABLET ORAL
Status: COMPLETED
Start: 2024-10-18 | End: 2024-10-18

## 2024-10-18 RX ORDER — LIDOCAINE HYDROCHLORIDE AND EPINEPHRINE 10; 10 MG/ML; UG/ML
INJECTION, SOLUTION INFILTRATION; PERINEURAL
Status: DISCONTINUED
Start: 2024-10-18 | End: 2024-10-18 | Stop reason: HOSPADM

## 2024-10-18 RX ORDER — LIDOCAINE HYDROCHLORIDE 10 MG/ML
INJECTION, SOLUTION EPIDURAL; INFILTRATION; INTRACAUDAL; PERINEURAL
Status: DISCONTINUED | OUTPATIENT
Start: 2024-10-18 | End: 2024-10-18 | Stop reason: SDUPTHER

## 2024-10-18 RX ORDER — LIDOCAINE HYDROCHLORIDE 10 MG/ML
1 INJECTION, SOLUTION EPIDURAL; INFILTRATION; INTRACAUDAL; PERINEURAL
Status: DISCONTINUED | OUTPATIENT
Start: 2024-10-18 | End: 2024-10-18 | Stop reason: HOSPADM

## 2024-10-18 RX ORDER — PROCHLORPERAZINE EDISYLATE 5 MG/ML
5 INJECTION INTRAMUSCULAR; INTRAVENOUS
Status: DISCONTINUED | OUTPATIENT
Start: 2024-10-18 | End: 2024-10-18 | Stop reason: HOSPADM

## 2024-10-18 RX ORDER — LIDOCAINE HYDROCHLORIDE AND EPINEPHRINE 10; 10 MG/ML; UG/ML
INJECTION, SOLUTION INFILTRATION; PERINEURAL PRN
Status: DISCONTINUED | OUTPATIENT
Start: 2024-10-18 | End: 2024-10-18 | Stop reason: ALTCHOICE

## 2024-10-18 RX ORDER — PROPOFOL 10 MG/ML
INJECTION, EMULSION INTRAVENOUS
Status: DISCONTINUED | OUTPATIENT
Start: 2024-10-18 | End: 2024-10-18 | Stop reason: SDUPTHER

## 2024-10-18 RX ORDER — OXYCODONE AND ACETAMINOPHEN 5; 325 MG/1; MG/1
1 TABLET ORAL EVERY 6 HOURS PRN
Qty: 12 TABLET | Refills: 0 | Status: SHIPPED | OUTPATIENT
Start: 2024-10-18 | End: 2024-10-21

## 2024-10-18 RX ADMIN — FENTANYL CITRATE 25 MCG: 50 INJECTION, SOLUTION INTRAMUSCULAR; INTRAVENOUS at 09:24

## 2024-10-18 RX ADMIN — SODIUM CHLORIDE, PRESERVATIVE FREE 10 ML: 5 INJECTION INTRAVENOUS at 09:12

## 2024-10-18 RX ADMIN — ACETAMINOPHEN 1000 MG: 500 TABLET ORAL at 10:30

## 2024-10-18 RX ADMIN — FENTANYL CITRATE 25 MCG: 50 INJECTION, SOLUTION INTRAMUSCULAR; INTRAVENOUS at 09:21

## 2024-10-18 RX ADMIN — PROPOFOL 75 MCG/KG/MIN: 10 INJECTION, EMULSION INTRAVENOUS at 09:13

## 2024-10-18 RX ADMIN — Medication 1000 MG: at 10:30

## 2024-10-18 RX ADMIN — Medication 2000 MG: at 09:15

## 2024-10-18 RX ADMIN — LIDOCAINE HYDROCHLORIDE 50 MG: 10 INJECTION, SOLUTION EPIDURAL; INFILTRATION; INTRACAUDAL; PERINEURAL at 09:12

## 2024-10-18 RX ADMIN — PROPOFOL 30 MG: 10 INJECTION, EMULSION INTRAVENOUS at 09:12

## 2024-10-18 RX ADMIN — GENTAMICIN SULFATE 80 MG: 80 INJECTION, SOLUTION INTRAVENOUS at 09:10

## 2024-10-18 RX ADMIN — MIDAZOLAM 2 MG: 1 INJECTION INTRAMUSCULAR; INTRAVENOUS at 09:08

## 2024-10-18 RX ADMIN — FENTANYL CITRATE 50 MCG: 50 INJECTION, SOLUTION INTRAMUSCULAR; INTRAVENOUS at 09:12

## 2024-10-18 ASSESSMENT — PAIN - FUNCTIONAL ASSESSMENT
PAIN_FUNCTIONAL_ASSESSMENT: NONE - DENIES PAIN
PAIN_FUNCTIONAL_ASSESSMENT: 0-10

## 2024-10-18 ASSESSMENT — PAIN DESCRIPTION - LOCATION: LOCATION: HEAD

## 2024-10-18 ASSESSMENT — PAIN DESCRIPTION - PAIN TYPE: TYPE: ACUTE PAIN

## 2024-10-18 ASSESSMENT — LIFESTYLE VARIABLES: SMOKING_STATUS: 1

## 2024-10-18 ASSESSMENT — PAIN DESCRIPTION - DESCRIPTORS: DESCRIPTORS: ACHING

## 2024-10-18 ASSESSMENT — PAIN SCALES - GENERAL: PAINLEVEL_OUTOF10: 3

## 2024-10-18 NOTE — DISCHARGE INSTRUCTIONS
Refrain from excessive straining and heavy lifting for 2 weeks.  The [employer] representative will be reaching out to patient to adjust Interstim stimulation parameters as needed.  Follow up in 2 weeks for Interstim sacral neuromodulation device stage II implant.  Keep dressing over the implant exit site intact and dry until the next procedure in 2 weeks.     Activity   You have had anesthesia today  Do not drive or operate heavy equipment, consume alcoholic beverages,  important decisions for 24 hours, or while you are taking pain medication:  Take your time changing positions today. You may feel light headed or dizzy if you move too quickly.   Continue your home medications as ordered by your physician.   Rest for the next 24 hours. Getting enough rest will help you recover.   No  heavy lifting or straining right after surgery.     Diet   You can eat your normal diet when you feel well. You should start off with bland foods like chicken soup, toast, or yogurt. Then advance as tolerated.  Drink plenty of fluids (unless your doctor tells you not to). Your urine should be very lightly colored without a strong odor.  Try to avoid constipation and straining with bowel movements. You may want to increase fiber intake. If you have not had a bowel movement after a couple of days, ask your doctor about taking a stool softener.    Medicines   If the doctor gave you a prescription medicine for pain, take it as needed as prescribed.   If you are not taking a prescription pain medicine, ask your doctor what type of over-the-counter medicine, you can take.  If you think your pain medicine is making you sick to your stomach, take with food  If your doctor prescribed antibiotics, take them until prescription is gone, as directed. Do not stop taking them just because you feel better. You need to take the full course of antibiotics.    Care of the cut (incision)   Do not change dressing   Keep the area clean and dry.    Call your

## 2024-10-18 NOTE — OP NOTE
Operative Note      Patient: Shena Pacheco  YOB: 1980  MRN: [id number]    Date of Procedure: 10/18/2024    Pre-Op Diagnosis Codes:      * Urge incontinence [N39.41]    Post-Op Diagnosis: Same       Procedure(s):  STIMULATOR INSERTION STAGE ONE WITH C-ARM AND MEDTR[employer]IC INTERSTIM    Surgeon(s):  Kyle Dorantes MD    Assistant:   * No surgical staff found *    Anesthesia: General    Estimated Blood Loss (mL): Minimal    Complications: None    Specimens:   * No specimens in log *    Implants:  * No surgical log found *      Drains: * No LDAs found *    Findings:  Infection Present At Time Of Surgery (PATOS) (choose all levels that have infection present):  No infection present  Other Findings: see below     Detailed Description of Procedure:   INDICATIONS FOR THE PROCEDURE:  Shena Pacheco is a 44 y.o. female with a history of medical refractory urinary frequency and urgency with Urge urinary incontinence. After treatment options were discussed including risks, benefits, alternatives, goals and possible complications, the patient elected to proceed with today's procedure.  Patient given Ancef 2 gm and Gentamicin 80 mg IV on call to OR.      NARRATIVE SUMMARY OF THE PROCEDURE:  The patient was brought to the OR. The patient was placed in prone position. A pillow was placed underneath her pelvis area to slightly lift the pelvis up. The patient was awake, was given some MAC anesthesia. The patient's back was prepped and draped in the usual sterile fashion. We used fluoroscopy to measure our sacral landmarks. Lidocaine 1% was applied on the right side near the S3 foramen. Under fluoroscopy, the needle placement was confirmed. We tested the placement of needle and noted the patient did have plantar flexion of the great toe but no brigida response.  This was thought to be due to the fact the patient is quite thin and the local anesthesia used in the skin may have impaired the motor response.  There was  excellent positioning both laterally and anteriorly using fluoro.  A wire was placed. The tract was dilated and lead was placed. We tested the lead and noted the proper responses as before indicating the lead is in its proper position. Lead was tunneled under the skin and was brought out through an incision on the left upper buttocks. Please note that the lidocaine was injected prior to the tunneling. A pouch was created about 1 cm beneath the subcutaneous tissue over the muscle where the lead was connected to the external wire. Screws were tightened with a hex wrench. Attention was made to ensure that the lead was all the way in into the InterStim. We then tunneled the external lead across to the left side of the buttocks.  Irrigation was performed. The needle site was closed using 4-0 Monocryl. The pouch was closed using 4-0 Vicryl and the subcutaneous tissue with 4-0 Monocryl. Dermabond was applied.\The patient was brought to recovery in a stable condition.      Plan:  Discharge home in stable condition     Electronically signed by Kyle Dorantes MD on 10/18/2024 at 7:18 AM

## 2024-10-18 NOTE — ANESTHESIA POSTPROCEDURE EVALUATION
Department of Anesthesiology  Postprocedure Note    Patient: Shena Pacheco  MRN: [id number]  YOB: 1980  Date of evaluation: 10/18/2024    Procedure Summary       Date: 10/18/24 Room / Location: [address]    Anesthesia Start: 0908 Anesthesia Stop: 1005    Procedure: STIMULATOR INSERTION STAGE ONE WITH C-ARM AND [employer] INTERSTIM Diagnosis:       Urge incontinence      (Urge incontinence [N39.41])    Surgeons: Kyle Dorantes MD Responsible Provider: Moises Hutson MD    Anesthesia Type: TIVA ASA Status: 3            Anesthesia Type: No value filed.    Reva Phase I: Reva Score: 10    Reva Phase II: Reva Score: 10    Anesthesia Post Evaluation    Patient location during evaluation: PACU  Patient participation: complete - patient participated  Level of consciousness: awake and awake and alert  Pain score: 3  Nausea & Vomiting: no nausea and no vomiting  Cardiovascular status: hemodynamically stable and blood pressure returned to baseline  Respiratory status: acceptable  Hydration status: euvolemic  Multimodal analgesia pain management approach  Pain management: adequate    No notable events documented.

## 2024-10-18 NOTE — ANESTHESIA PRE PROCEDURE
Department of Anesthesiology  Preprocedure Note       Name:  Shena Pacheco   Age:  44 y.o.  :  1980                                          MRN:  [id number]         Date:  10/18/2024      Surgeon: Surgeon(s):  Kyle Dorantes MD    Procedure: Procedure(s):  STIMULATOR INSERTION STAGE ONE WITH C-ARM AND [employer] INTERSTIM    Medications prior to admission:   Prior to Admission medications    Medication Sig Start Date End Date Taking? Authorizing Provider   clonazePAM (KLONOPIN) 0.5 MG tablet TAKE 1 TO 2 TABLETS BY MOUTH AT BEDTIME AS NEEDED 24  Yes Dina Cameron MD   magnesium oxide (MAG-OX) 400 MG tablet Take 1 tablet by mouth nightly at bedtime. 24  Yes Dina Cameron MD   B Complex Vitamins (VITAMIN B COMPLEX PO) Take by mouth   Yes Dina Cameron MD   amphetamine-dextroamphetamine (ADDERALL) 20 MG tablet Take 1 tablet by mouth 2 times daily. 23  Yes Dina Cameron MD   Calcium Carb-Cholecalciferol (CALCIUM 500 + D3 PO) Take by mouth   Yes Dina Cameron MD   BIOTIN PO Take by mouth   Yes Dina Cameron MD   Multiple Vitamins-Minerals (THERAPEUTIC MULTIVITAMIN-MINERALS) tablet Take 1 tablet by mouth daily   Yes Dina Cameron MD   ibuprofen (ADVIL;MOTRIN) 800 MG tablet TAKE 1 TABLET BY MOUTH EVERY 6 HOURS AS NEEDED FOR PAIN WITH FOOD OR MILK 24   Renee Valdez PA-C   Semaglutide-Weight Management (WEGOVY) 1 MG/0.5ML SOAJ SC injection INJECT 1MG SUBCUTANEOUSLY  ONCE WEEKLY (EVERY 7 DAYS)  Patient taking differently: INJECT 1MG SUBCUTANEOUSLY  ONCE WEEKLY (EVERY 7 DAYS)    Sundays 8/5/24   Renee Valdez PA-C   buPROPion (WELLBUTRIN XL) 150 MG extended release tablet Take 1 tablet by mouth every morning  Patient not taking: Reported on 10/15/2024 7/17/24   Dina Cameron MD   eszopiclone 3 MG TABS Take 1 tablet by mouth nightly.  Patient not taking: Reported on 10/15/2024 7/9/24   Dina Cameron MD   TRINTELLIX 10

## 2024-10-18 NOTE — H&P
[facility]  Kyle Dorantes MD FACS    History and Physical    Patient:  Shena Pacheco  MRN: [id number]  YOB: 1980    CHIEF COMPLAINT:  Urge urinary incontinence     HISTORY OF PRESENT ILLNESS:   The patient is a 44 y.o. female who presents with:  Patient's last Cystoscopy and transvesical Botox (200 IU) done on 5/3/24 wore off after 3 months.  Also, she seemed to have increased UTI's.  She still has urgency and frequency and Urge urinary incontinence as well as nocturia x 2-3.  Patient here for a stage I Interstim sacral neuromodulation device under MAC to see if patient responds sufficiently to warrant a stage II or permanent implant.    Patient's old records, notes and chart reviewed and summarized above.    Past Medical History:    Past Medical History:   Diagnosis Date    ADD (attention deficit disorder)     Allergic rhinitis     Anxiety     Back pain     Body piercing     several,instructed to remove.will remove tongue/nose    Cancer (HCC) Cervical cancer, neuroendocrine tumor 2020    Carpal tunnel syndrome, bilateral 05/19/2016    Chronic back pain     started pain mgmnt 2/218    Depression     Foot pain     left (d/t back)    History of cervical cancer age 18    History of colon polyps     x 1 precancerous polyp    History of gastroesophageal reflux (GERD)     \"mild\"  no medications    History of miscarriage     x 2    Hx of blood clots     right leg superficial blood clot    Hyperlipidemia     watching diet    Lumbago     Neuroendocrine tumor     OAB (overactive bladder) 07/12/2017    Obesity (BMI 30-39.9)     PUD (peptic ulcer disease)     Sciatica     Under care of team     pcp ALLIE mccarthy    Under care of team     psychiatrist dr connor every 3 months    Urge incontinence     Urinary urgency        Past Surgical History:    Past Surgical History:   Procedure Laterality Date    BACK SURGERY  09/19/2016    Lumbar interbody fusion posterior, L5-S1, had 5      -----------------------------------------------------------------  Imaging Results:      Assessment and Plan   Impression:    Patient Active Problem List   Diagnosis    Anxiety    GERD (gastroesophageal reflux disease)    Sciatica    Urge incontinence    ADD (attention deficit disorder)    Carpal tunnel syndrome of right wrist    OAB (overactive bladder)    Degenerative disc disease, lumbar    Urinary frequency    Nocturia    Neuroendocrine tumor    Enterococcus UTI    Stress incontinence in female    Macular hole, left eye    History of loop electrical excision procedure (LEEP)    H/O bilateral breast implants    AV block, 1st degree       Plan: Stage I Interstim sacral neuromodulation device under MAC     Kyle Dorantes MD  7:16 AM 10/18/2024

## 2024-10-22 ENCOUNTER — OFFICE VISIT (OUTPATIENT)
Age: 44
End: 2024-10-22
Payer: COMMERCIAL

## 2024-10-22 VITALS
HEART RATE: 72 BPM | WEIGHT: 166 LBS | HEIGHT: 67 IN | DIASTOLIC BLOOD PRESSURE: 80 MMHG | BODY MASS INDEX: 26.06 KG/M2 | SYSTOLIC BLOOD PRESSURE: 116 MMHG | RESPIRATION RATE: 17 BRPM

## 2024-10-22 DIAGNOSIS — Z98.890 HISTORY OF LUMBAR SURGERY: ICD-10-CM

## 2024-10-22 DIAGNOSIS — M54.32 SCIATICA OF LEFT SIDE: Primary | ICD-10-CM

## 2024-10-22 PROCEDURE — 99204 OFFICE O/P NEW MOD 45 MIN: CPT | Performed by: NEUROLOGICAL SURGERY

## 2024-10-22 ASSESSMENT — ENCOUNTER SYMPTOMS
BACK PAIN: 1
TROUBLE SWALLOWING: 1

## 2024-10-23 ENCOUNTER — OFFICE VISIT (OUTPATIENT)
Dept: FAMILY MEDICINE CLINIC | Age: 44
End: 2024-10-23
Payer: COMMERCIAL

## 2024-10-23 VITALS
SYSTOLIC BLOOD PRESSURE: 128 MMHG | HEART RATE: 63 BPM | WEIGHT: 165 LBS | BODY MASS INDEX: 25.9 KG/M2 | DIASTOLIC BLOOD PRESSURE: 80 MMHG | TEMPERATURE: 97 F | HEIGHT: 67 IN

## 2024-10-23 DIAGNOSIS — N39.3 STRESS INCONTINENCE IN FEMALE: ICD-10-CM

## 2024-10-23 DIAGNOSIS — E66.3 OVERWEIGHT (BMI 25.0-29.9): Primary | ICD-10-CM

## 2024-10-23 PROCEDURE — 99213 OFFICE O/P EST LOW 20 MIN: CPT | Performed by: PHYSICIAN ASSISTANT

## 2024-10-23 ASSESSMENT — ENCOUNTER SYMPTOMS
SHORTNESS OF BREATH: 0
CONSTIPATION: 0
DIARRHEA: 0
COLOR CHANGE: 0
VOMITING: 0
ABDOMINAL PAIN: 0
NAUSEA: 0
COUGH: 0

## 2024-10-23 NOTE — PROGRESS NOTES
[facility]  [facility]  [address] [address]  Dept: [phone]  Dept Fax: [phone]    Shena Pacheco is a 44 y.o. female who presents today for her medical conditions/complaintsas noted below.  Shena Pacheco is c/o of   Chief Complaint   Patient presents with    Weight Management    Consultation     Discuss the different consultations she has had       HPI:     HPI    Patient is here to check up and review recent consultations and weight loss she has had. She updates she now has a bladder stimulant. She has noticed this is working really well for her. Less frequent waking up to urinate, better control and timing of urination.     Patient is presently on wegovy 1mg. She reports tolerating this well. Patient has started using a [employer]'s nahun at home and also has a  she uses through work. She does try to stretch regularly. She is down 1 pound from last visit. Patient is trying to eat healthy but has found it harder since she has been laid off work. She is returning to work next week    No results found for: \"LABA1C\"          ( goal A1Cis < 7)   No components found for: \"LABMICR\"  No components found for: \"LDLCHOLESTEROL\", \"LDLCALC\"    (goal LDL is <100)   AST (U/L)   Date Value   02/14/2022 20     ALT (U/L)   Date Value   02/14/2022 18     BUN (mg/dL)   Date Value   06/22/2024 13     BP Readings from Last 3 Encounters:   10/23/24 128/80   10/22/24 116/80   10/18/24 (!) 95/59          (goal 120/80)    Past Medical History:   Diagnosis Date    ADD (attention deficit disorder)     Allergic rhinitis     Anxiety     Back pain     Body piercing     several,instructed to remove.will remove tongue/nose    Cancer (HCC) Cervical cancer, neuroendocrine tumor 2020    Carpal tunnel syndrome, bilateral 05/19/2016    Chronic back pain     started pain mgmnt 2/218    Depression     Foot pain     left (d/t back)    History of cervical cancer age 18    History of

## 2024-10-23 NOTE — PROGRESS NOTES
Review of Systems   HENT:  Positive for trouble swallowing.    Eyes:  Positive for visual disturbance.   Genitourinary:  Positive for difficulty urinating.   Musculoskeletal:  Positive for back pain and joint swelling.   Neurological:  Positive for dizziness, weakness and numbness.   Psychiatric/Behavioral:  Positive for decreased concentration and sleep disturbance.    All other systems reviewed and are negative.    
by Nasal route 2 times daily Use in each nostril as directed 60 mL 1    clonazePAM (KLONOPIN) 0.5 MG tablet TAKE 1 TO 2 TABLETS BY MOUTH AT BEDTIME AS NEEDED      magnesium oxide (MAG-OX) 400 MG tablet Take 1 tablet by mouth nightly at bedtime.      B Complex Vitamins (VITAMIN B COMPLEX PO) Take by mouth      amphetamine-dextroamphetamine (ADDERALL) 20 MG tablet Take 1 tablet by mouth 2 times daily.      Calcium Carb-Cholecalciferol (CALCIUM 500 + D3 PO) Take by mouth      BIOTIN PO Take by mouth      Multiple Vitamins-Minerals (THERAPEUTIC MULTIVITAMIN-MINERALS) tablet Take 1 tablet by mouth daily      buPROPion (WELLBUTRIN XL) 150 MG extended release tablet Take 1 tablet by mouth every morning (Patient not taking: Reported on 10/15/2024)      eszopiclone 3 MG TABS Take 1 tablet by mouth nightly. (Patient not taking: Reported on 10/15/2024)      TRINTELLIX 10 MG TABS tablet Take 1 tablet by mouth daily (Patient not taking: Reported on 10/15/2024)      ketorolac 0.4 % SOLN ophthalmic solution 4 times daily       No current facility-administered medications on file prior to visit.     Social History     Tobacco Use    Smoking status: Former     Current packs/day: 0.00     Types: Cigarettes, E-Cigarettes     Quit date: 1998     Years since quittin.8    Smokeless tobacco: Never   Vaping Use    Vaping status: Every Day    Substances: Flavoring    Devices: Disposable   Substance Use Topics    Alcohol use: Yes     Alcohol/week: 1.0 standard drink of alcohol     Types: 1 Glasses of wine per week     Comment: Maybe a couple drinks a month    Drug use: Yes     Types: Marijuana (Weed)     Comment: Smoke to help fall asleep       No Known Allergies    Review of Systems:     I reviewed the review of systems as documented by clinical staff.      Physical Exam:      /80 (Site: Left Upper Arm, Position: Sitting, Cuff Size: Large Adult)   Pulse 72   Resp 17   Ht 1.702 m (5' 7\")   Wt 75.3 kg (166 lb)   LMP

## 2024-10-23 NOTE — PROGRESS NOTES
Visit Information    Have you changed or started any medications since your last visit including any over-the-counter medicines, vitamins, or herbal medicines? no   Are you having any side effects from any of your medications? -  no  Have you stopped taking any of your medications? Is so, why? -  no    Have you seen any other physician or provider since your last visit? No  Have you had any other diagnostic tests since your last visit? No  Have you been seen in the emergency room and/or had an admission to a hospital since we last saw you? No  Have you had your routine dental cleaning in the past 6 months? no    Have you activated your [employer] account? If not, what are your barriers?      Patient Care Team:  Renee Valdez PA-C as PCP - General (Family Medicine)  Renee Valdez PA-C as PCP - Empaneled Provider  Kyle Dorantes MD as Consulting Physician (Urology)    Medical History Review  Past Medical, Family, and Social History reviewed and  contribute to the patient presenting condition    Health Maintenance   Topic Date Due    Varicella vaccine (1 of 2 - 13+ 2-dose series) Never done    Hepatitis C screen  Never done    Hepatitis B vaccine (1 of 3 - 19+ 3-dose series) Never done    Flu vaccine (1) Never done    COVID-19 Vaccine (2 - 2023-24 season) 09/01/2024    Depression Screen  01/05/2025    Breast cancer screen  01/30/2026    Cervical cancer screen  10/13/2026    Lipids  02/14/2027    DTaP/Tdap/Td vaccine (2 - Td or Tdap) 08/28/2028    HIV screen  Completed    Hepatitis A vaccine  Aged Out    Hib vaccine  Aged Out    HPV vaccine  Aged Out    Polio vaccine  Aged Out    Meningococcal (ACWY) vaccine  Aged Out    Pneumococcal 0-64 years Vaccine  Aged Out    Diabetes screen  Discontinued

## 2024-10-30 ENCOUNTER — ANESTHESIA EVENT (OUTPATIENT)
Dept: OPERATING ROOM | Age: 44
End: 2024-10-30
Payer: COMMERCIAL

## 2024-11-01 ENCOUNTER — ANESTHESIA (OUTPATIENT)
Dept: OPERATING ROOM | Age: 44
End: 2024-11-01
Payer: COMMERCIAL

## 2024-11-01 ENCOUNTER — HOSPITAL ENCOUNTER (OUTPATIENT)
Age: 44
Setting detail: OUTPATIENT SURGERY
Discharge: HOME | End: 2024-11-01
Attending: SPECIALIST | Admitting: SPECIALIST
Payer: COMMERCIAL

## 2024-11-01 VITALS
TEMPERATURE: 97.1 F | DIASTOLIC BLOOD PRESSURE: 45 MMHG | WEIGHT: 170 LBS | HEART RATE: 55 BPM | OXYGEN SATURATION: 97 % | SYSTOLIC BLOOD PRESSURE: 94 MMHG | HEIGHT: 67 IN | BODY MASS INDEX: 26.68 KG/M2 | RESPIRATION RATE: 17 BRPM

## 2024-11-01 PROCEDURE — 3600000002 HC SURGERY LEVEL 2 BASE: Performed by: SPECIALIST

## 2024-11-01 PROCEDURE — 64590 INS/RPL PRPH SAC/GSTR NPG/R: CPT | Performed by: SPECIALIST

## 2024-11-01 PROCEDURE — 2500000003 HC RX 250 WO HCPCS: Performed by: NURSE ANESTHETIST, CERTIFIED REGISTERED

## 2024-11-01 PROCEDURE — 2580000003 HC RX 258: Performed by: SPECIALIST

## 2024-11-01 PROCEDURE — 3700000001 HC ADD 15 MINUTES (ANESTHESIA): Performed by: SPECIALIST

## 2024-11-01 PROCEDURE — 2500000003 HC RX 250 WO HCPCS: Performed by: SPECIALIST

## 2024-11-01 PROCEDURE — 6360000002 HC RX W HCPCS: Performed by: SPECIALIST

## 2024-11-01 PROCEDURE — C1767 GENERATOR, NEURO NON-RECHARG: HCPCS | Performed by: SPECIALIST

## 2024-11-01 PROCEDURE — C1787 PATIENT PROGR, NEUROSTIM: HCPCS | Performed by: SPECIALIST

## 2024-11-01 PROCEDURE — 6360000002 HC RX W HCPCS: Performed by: NURSE ANESTHETIST, CERTIFIED REGISTERED

## 2024-11-01 PROCEDURE — 7100000011 HC PHASE II RECOVERY - ADDTL 15 MIN: Performed by: SPECIALIST

## 2024-11-01 PROCEDURE — 7100000010 HC PHASE II RECOVERY - FIRST 15 MIN: Performed by: SPECIALIST

## 2024-11-01 PROCEDURE — 3700000000 HC ANESTHESIA ATTENDED CARE: Performed by: SPECIALIST

## 2024-11-01 PROCEDURE — 2709999900 HC NON-CHARGEABLE SUPPLY: Performed by: SPECIALIST

## 2024-11-01 PROCEDURE — 3600000012 HC SURGERY LEVEL 2 ADDTL 15MIN: Performed by: SPECIALIST

## 2024-11-01 DEVICE — NEUROSTIMULATOR INTERSTIM X RECHRGE FREE TORQ WRNCH PROD: Type: IMPLANTABLE DEVICE | Site: BACK | Status: FUNCTIONAL

## 2024-11-01 RX ORDER — GENTAMICIN SULFATE 80 MG/50ML
80 INJECTION, SOLUTION INTRAVENOUS
Status: COMPLETED | OUTPATIENT
Start: 2024-11-01 | End: 2024-11-01

## 2024-11-01 RX ORDER — GENTAMICIN 40 MG/ML
INJECTION, SOLUTION INTRAMUSCULAR; INTRAVENOUS
Status: DISCONTINUED
Start: 2024-11-01 | End: 2024-11-01 | Stop reason: HOSPADM

## 2024-11-01 RX ORDER — METOCLOPRAMIDE HYDROCHLORIDE 5 MG/ML
10 INJECTION INTRAMUSCULAR; INTRAVENOUS
Status: DISCONTINUED | OUTPATIENT
Start: 2024-11-01 | End: 2024-11-01 | Stop reason: HOSPADM

## 2024-11-01 RX ORDER — OXYCODONE AND ACETAMINOPHEN 5; 325 MG/1; MG/1
1 TABLET ORAL
Status: DISCONTINUED | OUTPATIENT
Start: 2024-11-01 | End: 2024-11-01 | Stop reason: HOSPADM

## 2024-11-01 RX ORDER — SODIUM CHLORIDE 0.9 % (FLUSH) 0.9 %
5-40 SYRINGE (ML) INJECTION EVERY 12 HOURS SCHEDULED
Status: DISCONTINUED | OUTPATIENT
Start: 2024-11-01 | End: 2024-11-01 | Stop reason: HOSPADM

## 2024-11-01 RX ORDER — MIDAZOLAM HYDROCHLORIDE 2 MG/2ML
2 INJECTION, SOLUTION INTRAMUSCULAR; INTRAVENOUS
Status: DISCONTINUED | OUTPATIENT
Start: 2024-11-01 | End: 2024-11-01 | Stop reason: HOSPADM

## 2024-11-01 RX ORDER — ONDANSETRON 2 MG/ML
4 INJECTION INTRAMUSCULAR; INTRAVENOUS
Status: DISCONTINUED | OUTPATIENT
Start: 2024-11-01 | End: 2024-11-01 | Stop reason: HOSPADM

## 2024-11-01 RX ORDER — DEXAMETHASONE SODIUM PHOSPHATE 10 MG/ML
INJECTION, SOLUTION INTRAMUSCULAR; INTRAVENOUS
Status: DISCONTINUED | OUTPATIENT
Start: 2024-11-01 | End: 2024-11-01 | Stop reason: SDUPTHER

## 2024-11-01 RX ORDER — DIPHENHYDRAMINE HYDROCHLORIDE 50 MG/ML
12.5 INJECTION INTRAMUSCULAR; INTRAVENOUS
Status: DISCONTINUED | OUTPATIENT
Start: 2024-11-01 | End: 2024-11-01 | Stop reason: HOSPADM

## 2024-11-01 RX ORDER — SODIUM CHLORIDE, SODIUM LACTATE, POTASSIUM CHLORIDE, CALCIUM CHLORIDE 600; 310; 30; 20 MG/100ML; MG/100ML; MG/100ML; MG/100ML
INJECTION, SOLUTION INTRAVENOUS CONTINUOUS
Status: DISCONTINUED | OUTPATIENT
Start: 2024-11-01 | End: 2024-11-01 | Stop reason: HOSPADM

## 2024-11-01 RX ORDER — ONDANSETRON 2 MG/ML
INJECTION INTRAMUSCULAR; INTRAVENOUS
Status: DISCONTINUED | OUTPATIENT
Start: 2024-11-01 | End: 2024-11-01 | Stop reason: SDUPTHER

## 2024-11-01 RX ORDER — MIDAZOLAM HYDROCHLORIDE 1 MG/ML
INJECTION, SOLUTION INTRAMUSCULAR; INTRAVENOUS
Status: DISCONTINUED | OUTPATIENT
Start: 2024-11-01 | End: 2024-11-01 | Stop reason: SDUPTHER

## 2024-11-01 RX ORDER — LIDOCAINE HYDROCHLORIDE AND EPINEPHRINE 10; 10 MG/ML; UG/ML
INJECTION, SOLUTION INFILTRATION; PERINEURAL PRN
Status: DISCONTINUED | OUTPATIENT
Start: 2024-11-01 | End: 2024-11-01 | Stop reason: ALTCHOICE

## 2024-11-01 RX ORDER — SODIUM CHLORIDE 9 MG/ML
INJECTION, SOLUTION INTRAVENOUS PRN
Status: DISCONTINUED | OUTPATIENT
Start: 2024-11-01 | End: 2024-11-01 | Stop reason: HOSPADM

## 2024-11-01 RX ORDER — CEFADROXIL 500 MG/1
500 CAPSULE ORAL 2 TIMES DAILY
Qty: 10 CAPSULE | Refills: 0 | Status: SHIPPED | OUTPATIENT
Start: 2024-11-01

## 2024-11-01 RX ORDER — MORPHINE SULFATE 2 MG/ML
2 INJECTION, SOLUTION INTRAMUSCULAR; INTRAVENOUS EVERY 5 MIN PRN
Status: DISCONTINUED | OUTPATIENT
Start: 2024-11-01 | End: 2024-11-01 | Stop reason: HOSPADM

## 2024-11-01 RX ORDER — HYDRALAZINE HYDROCHLORIDE 20 MG/ML
10 INJECTION INTRAMUSCULAR; INTRAVENOUS
Status: DISCONTINUED | OUTPATIENT
Start: 2024-11-01 | End: 2024-11-01 | Stop reason: HOSPADM

## 2024-11-01 RX ORDER — LABETALOL HYDROCHLORIDE 5 MG/ML
10 INJECTION, SOLUTION INTRAVENOUS
Status: DISCONTINUED | OUTPATIENT
Start: 2024-11-01 | End: 2024-11-01 | Stop reason: HOSPADM

## 2024-11-01 RX ORDER — SODIUM CHLORIDE 0.9 % (FLUSH) 0.9 %
5-40 SYRINGE (ML) INJECTION PRN
Status: DISCONTINUED | OUTPATIENT
Start: 2024-11-01 | End: 2024-11-01 | Stop reason: HOSPADM

## 2024-11-01 RX ORDER — PROPOFOL 10 MG/ML
INJECTION, EMULSION INTRAVENOUS
Status: DISCONTINUED | OUTPATIENT
Start: 2024-11-01 | End: 2024-11-01 | Stop reason: SDUPTHER

## 2024-11-01 RX ORDER — OXYCODONE AND ACETAMINOPHEN 5; 325 MG/1; MG/1
2 TABLET ORAL
Status: DISCONTINUED | OUTPATIENT
Start: 2024-11-01 | End: 2024-11-01 | Stop reason: HOSPADM

## 2024-11-01 RX ORDER — LIDOCAINE HYDROCHLORIDE AND EPINEPHRINE 10; 10 MG/ML; UG/ML
INJECTION, SOLUTION INFILTRATION; PERINEURAL
Status: DISCONTINUED
Start: 2024-11-01 | End: 2024-11-01 | Stop reason: HOSPADM

## 2024-11-01 RX ORDER — LIDOCAINE HYDROCHLORIDE 10 MG/ML
1 INJECTION, SOLUTION EPIDURAL; INFILTRATION; INTRACAUDAL; PERINEURAL
Status: DISCONTINUED | OUTPATIENT
Start: 2024-11-01 | End: 2024-11-01 | Stop reason: HOSPADM

## 2024-11-01 RX ORDER — GLYCOPYRROLATE 0.2 MG/ML
0.4 INJECTION INTRAMUSCULAR; INTRAVENOUS ONCE
Status: DISCONTINUED | OUTPATIENT
Start: 2024-11-01 | End: 2024-11-01 | Stop reason: HOSPADM

## 2024-11-01 RX ORDER — LIDOCAINE HYDROCHLORIDE 10 MG/ML
INJECTION, SOLUTION EPIDURAL; INFILTRATION; INTRACAUDAL; PERINEURAL
Status: DISCONTINUED | OUTPATIENT
Start: 2024-11-01 | End: 2024-11-01 | Stop reason: SDUPTHER

## 2024-11-01 RX ORDER — IPRATROPIUM BROMIDE AND ALBUTEROL SULFATE 2.5; .5 MG/3ML; MG/3ML
1 SOLUTION RESPIRATORY (INHALATION)
Status: DISCONTINUED | OUTPATIENT
Start: 2024-11-01 | End: 2024-11-01 | Stop reason: HOSPADM

## 2024-11-01 RX ORDER — FENTANYL CITRATE 50 UG/ML
INJECTION, SOLUTION INTRAMUSCULAR; INTRAVENOUS
Status: DISCONTINUED | OUTPATIENT
Start: 2024-11-01 | End: 2024-11-01 | Stop reason: SDUPTHER

## 2024-11-01 RX ORDER — MEPERIDINE HYDROCHLORIDE 50 MG/ML
12.5 INJECTION INTRAMUSCULAR; INTRAVENOUS; SUBCUTANEOUS EVERY 5 MIN PRN
Status: DISCONTINUED | OUTPATIENT
Start: 2024-11-01 | End: 2024-11-01 | Stop reason: HOSPADM

## 2024-11-01 RX ORDER — NALOXONE HYDROCHLORIDE 0.4 MG/ML
INJECTION, SOLUTION INTRAMUSCULAR; INTRAVENOUS; SUBCUTANEOUS PRN
Status: DISCONTINUED | OUTPATIENT
Start: 2024-11-01 | End: 2024-11-01 | Stop reason: HOSPADM

## 2024-11-01 RX ADMIN — FENTANYL CITRATE 50 MCG: 50 INJECTION, SOLUTION INTRAMUSCULAR; INTRAVENOUS at 08:49

## 2024-11-01 RX ADMIN — PROPOFOL 50 MG: 10 INJECTION, EMULSION INTRAVENOUS at 08:49

## 2024-11-01 RX ADMIN — LIDOCAINE HYDROCHLORIDE 50 MG: 10 INJECTION, SOLUTION EPIDURAL; INFILTRATION; INTRACAUDAL; PERINEURAL at 08:49

## 2024-11-01 RX ADMIN — PROPOFOL 75 MCG/KG/MIN: 10 INJECTION, EMULSION INTRAVENOUS at 08:50

## 2024-11-01 RX ADMIN — DEXAMETHASONE SODIUM PHOSPHATE 10 MG: 10 INJECTION, SOLUTION INTRAMUSCULAR; INTRAVENOUS at 08:53

## 2024-11-01 RX ADMIN — ONDANSETRON 4 MG: 2 INJECTION INTRAMUSCULAR; INTRAVENOUS at 08:55

## 2024-11-01 RX ADMIN — Medication 2000 MG: at 08:54

## 2024-11-01 RX ADMIN — MIDAZOLAM 2 MG: 1 INJECTION INTRAMUSCULAR; INTRAVENOUS at 08:42

## 2024-11-01 RX ADMIN — GENTAMICIN SULFATE 80 MG: 80 INJECTION, SOLUTION INTRAVENOUS at 08:59

## 2024-11-01 ASSESSMENT — PAIN - FUNCTIONAL ASSESSMENT
PAIN_FUNCTIONAL_ASSESSMENT: 0-10
PAIN_FUNCTIONAL_ASSESSMENT: 0-10

## 2024-11-01 ASSESSMENT — LIFESTYLE VARIABLES: SMOKING_STATUS: 1

## 2024-11-01 NOTE — DISCHARGE INSTRUCTIONS
Refrain from excessive straining and heavy lifting for 2 weeks.  The [employer] representative will be reaching out to patient to adjust Interstim stimulation parameters as needed.  Follow up with Jarad Dorantes M.D. in 3-4 weeks for a postoperative check.    His office will reach out and contact patient.       Call your doctor now or seek immediate medical care if:    You have pain that does not get better after you take pain medicine.    You have a fever over 101°F.    You have chills    You have signs of infection, such as:   Increased pain, swelling, warmth, or redness.   Red streaks leading from the incision.   Pus draining from the incision.     Activity  You have had anesthesia today  Do not drive, operate heavy equipment, consume alcoholic beverages, or make any important decisions  for 24 hours   If you are taking pain medication: Do not drive or consume alcohol.  Take your time changing positions today. You may feel light headed or dizzy if you move too quickly.   Continue your home medications as ordered by your physician.  Diet   You can eat your normal diet when you feel well. You should start off with bland foods like chicken soup, toast, or yogurt. Then advance as tolerated.  Drink plenty of fluids (unless your doctor tells you not to). Your urine should be very lightly colored without a strong odor.

## 2024-11-01 NOTE — H&P
[facility] Urology  Kyle Dornates MD [facility]    History and Physical    Patient:  Shena Pacheco  MRN: [id number]  YOB: 1980    CHIEF COMPLAINT:  urgency and frequency and Urge urinary incontinence     HISTORY OF PRESENT ILLNESS:   The patient is a 44 y.o. female who presents with urgency and frequency and Urge urinary incontinence s/p stage I Interstim sacral neuromodulation device.  Patient saw a >50% improvement in her symptoms and is here for a stage 2 Interstim sacral neuromodulation device.     Patient's old records, notes and chart reviewed and summarized above.    Past Medical History:    Past Medical History:   Diagnosis Date    ADD (attention deficit disorder)     Allergic rhinitis     Anxiety     Back pain     Body piercing     several,instructed to remove.will remove tongue/nose    Cancer (HCC) Cervical cancer, neuroendocrine tumor 2020    Carpal tunnel syndrome, bilateral 05/19/2016    Chronic back pain     started pain mgmnt 2/218    Depression     Foot pain     left (d/t back)    History of cervical cancer age 18    History of colon polyps     x 1 precancerous polyp    History of gastroesophageal reflux (GERD)     \"mild\"  no medications    History of miscarriage     x 2    Hx of blood clots     right leg superficial blood clot    Hyperlipidemia     watching diet    Low back pain     Lumbago     Lumbosacral disc disease     Memory disorder     Neck pain     Neuroendocrine tumor     OAB (overactive bladder) 07/12/2017    Obesity (BMI 30-39.9)     prior    PUD (peptic ulcer disease)     Sciatica     Under care of team     pcp ALLIE mccarthy    Under care of team     psychiatrist dr connor every 3 months    Urge incontinence     Urinary urgency        Past Surgical History:    Past Surgical History:   Procedure Laterality Date    BACK SURGERY  09/19/2016    Lumbar interbody fusion posterior, L5-S1, had 5 surgeries total    COLONOSCOPY  05/18/2020    COSMETIC SURGERY  Breast  \"LEUKOCYTESUR\", \"UROBILINOGEN\", \"BILIRUBINUR\", \"BLOODU\" in the last 72 hours.    Invalid input(s): \"NITRATE\", \"GLUCOSEUKETONESUAMORPHOUS\"     -----------------------------------------------------------------  Imaging Results:      Assessment and Plan   Impression:    Patient Active Problem List   Diagnosis    Anxiety    GERD (gastroesophageal reflux disease)    Sciatica    Urge incontinence    ADD (attention deficit disorder)    Carpal tunnel syndrome of right wrist    OAB (overactive bladder)    Degenerative disc disease, lumbar    Urinary frequency    Nocturia    Neuroendocrine tumor    Enterococcus UTI    Stress incontinence in female    Macular hole, left eye    History of loop electrical excision procedure (LEEP)    H/O bilateral breast implants    AV block, 1st degree    Overweight (BMI 25.0-29.9)       Plan: Stage Interstim sacral neuromodulation device under MAC.    Kyle Dorantes MD  7:31 AM 11/1/2024

## 2024-11-01 NOTE — ANESTHESIA PRE PROCEDURE
Department of Anesthesiology  Preprocedure Note       Name:  Shena Pacheco   Age:  44 y.o.  :  1980                                          MRN:  [id number]         Date:  2024      Surgeon: Surgeon(s):  Kyle Dorantes MD    Procedure: Procedure(s):  STIMULATOR INSERTION STAGE TWO [employer] INTERSTIM    Medications prior to admission:   Prior to Admission medications    Medication Sig Start Date End Date Taking? Authorizing Provider   ibuprofen (ADVIL;MOTRIN) 800 MG tablet TAKE 1 TABLET BY MOUTH EVERY 6 HOURS AS NEEDED FOR PAIN WITH FOOD OR MILK 24  Yes Renee Valdez PA-C   azelastine (ASTELIN) 0.1 % nasal spray 1 spray by Nasal route 2 times daily Use in each nostril as directed 24  Yes Renee Valdez PA-C   clonazePAM (KLONOPIN) 0.5 MG tablet TAKE 1 TO 2 TABLETS BY MOUTH AT BEDTIME AS NEEDED 24  Yes Dina Cameron MD   magnesium oxide (MAG-OX) 400 MG tablet Take 1 tablet by mouth nightly at bedtime. 24  Yes Dina Cameron MD   B Complex Vitamins (VITAMIN B COMPLEX PO) Take by mouth   Yes Dina Cameron MD   Calcium Carb-Cholecalciferol (CALCIUM 500 + D3 PO) Take by mouth   Yes Dina Cameron MD   BIOTIN PO Take by mouth   Yes Dina Cameron MD   Multiple Vitamins-Minerals (THERAPEUTIC MULTIVITAMIN-MINERALS) tablet Take 1 tablet by mouth daily   Yes Dina Cameron MD   cephALEXin (KEFLEX) 500 MG capsule Take 1 capsule by mouth 3 times daily  Patient not taking: Reported on 2024 10/18/24   Kyle Dorantes MD   Semaglutide-Weight Management (WEGOVY) 1 MG/0.5ML SOAJ SC injection INJECT 1MG SUBCUTANEOUSLY  ONCE WEEKLY (EVERY 7 DAYS)  Patient not taking: Reported on 10/28/2024 8/5/24   Renee Valdez PA-C   amphetamine-dextroamphetamine (ADDERALL) 20 MG tablet Take 1 tablet by mouth 2 times daily. 23   Dina Cameron MD       Current medications:    Current Facility-Administered Medications   Medication Dose

## 2024-11-01 NOTE — ANESTHESIA POSTPROCEDURE EVALUATION
Department of Anesthesiology  Postprocedure Note    Patient: Shena Pacheco  MRN: [id number]  YOB: 1980  Date of evaluation: 11/1/2024    Procedure Summary       Date: 11/01/24 Room / Location: [address]    Anesthesia Start: 0842 Anesthesia Stop: 0921    Procedure: STIMULATOR INSERTION STAGE TWO [employer] INTERSTIM Diagnosis:       Urge incontinence      (Urge incontinence [N39.41])    Surgeons: Kyle Dorantes MD Responsible Provider: Jacky Regan MD    Anesthesia Type: MAC ASA Status: 2            Anesthesia Type: No value filed.    Reva Phase I: Reva Score: 10    Reva Phase II:      Anesthesia Post Evaluation    Patient location during evaluation: PACU  Patient participation: complete - patient participated  Level of consciousness: awake and alert  Airway patency: patent  Nausea & Vomiting: no nausea and no vomiting  Cardiovascular status: hemodynamically stable  Respiratory status: room air and spontaneous ventilation  Hydration status: euvolemic  Multimodal analgesia pain management approach  Pain management: adequate    No notable events documented.

## 2024-11-01 NOTE — OP NOTE
Operative Note      Patient: Shena Pacheco  YOB: 1980  MRN: [id number]    Date of Procedure: 11/1/2024    Pre-Op Diagnosis Codes:      * Urge incontinence [N39.41]  Urgency and frequency    Post-Op Diagnosis: Same       Procedure(s):  STIMULATOR INSERTION STAGE TWO [employer] INTERSTIM    Surgeon(s):  Kyle Dorantes MD    Assistant:   * No surgical staff found *    Anesthesia: Monitor Anesthesia Care    Estimated Blood Loss (mL): Minimal    Complications: None    Specimens:   * No specimens in log *    Implants:  * No implants in log *      Drains: * No LDAs found *    Findings:  Infection Present At Time Of Surgery (PATOS) (choose all levels that have infection present):  No infection present  Other Findings: see below     Detailed Description of Procedure:   Indication:  Shena Pacheco is a 44 y.o. female with a history of overactive bladder symptoms. Patient presents s/p stage 1 Interstim sacral neuromodulation device implant approximately two weeks ago.  The patient noted a 56% improvement in her frequency, 62% improvement in total voids, 80% improvement in her urgency and resolution of her Urge urinary incontinence.  Thus, patient here to proceed with a stage 2 or permanent implant because of the favorable clinical response.  After treatment options were discussed including risks, benefits, alternatives, goals and possible complications, the patient elected to proceed with today's procedure.  Patient received Ancef 2 gm and gentamicin 80 IV on call to OR.    Narrative of the Procedure:    After informed consent was obtained, the patient was brought to the operating room and placed on the operating table in the prone position. Anesthesia was induced and antibiotics were given. The patient was prepped and draped in a sterile manner. To prevent contamination, the wire extension was left hanging off the patient on the side contralateral to the pocket. The wire overlying the surgical site was  thoroughly cleaned. A timeout occurred in which two patient identifiers were used    The scar overlying the site of previous connection was located and anesthetized. Using a 15-blade scalpel a skin incision was made over the scar. Blunt dissection was used to develop a pocket. The connection was identified and delivered through the incision and the wrench was used to detach the connection. The wire extension was transected at the level of the patient's skin and removed from under the drape. The lead was cleaned and the battery was attached using the quick-connection wrench. Care was taken to ensure the blue electrode tip was seen through the distal window on the battery. The battery was placed into the wound. The device was then interrogated. All resistances were within normal limits. The dermis was re-approximated with 2-0 Vicryl. The skin was closed with a 4-0 Monocryl in a subcuticular manner. The patient was then awakened and transferred to the PACU in good and stable condition.      Follow-Up: The patient will be discharged on 5 days of  cefadroxil 500 mg bid  and will follow up with Jarad Dorantes M.D. in 3-4 weeks for a postoperative check and evaluation by the [employer] representative.     Electronically signed by Kyle Dorantes MD on 11/1/2024 at 7:33 AM

## 2024-11-03 RX ORDER — SEMAGLUTIDE 1 MG/.5ML
INJECTION, SOLUTION SUBCUTANEOUS
Qty: 6 ML | Refills: 0 | Status: SHIPPED | OUTPATIENT
Start: 2024-11-03

## 2024-12-05 ENCOUNTER — OFFICE VISIT (OUTPATIENT)
Age: 44
End: 2024-12-05
Payer: COMMERCIAL

## 2024-12-05 DIAGNOSIS — R35.0 URINARY FREQUENCY: ICD-10-CM

## 2024-12-05 DIAGNOSIS — N39.41 URGE INCONTINENCE: Primary | ICD-10-CM

## 2024-12-05 DIAGNOSIS — N39.3 STRESS INCONTINENCE IN FEMALE: ICD-10-CM

## 2024-12-05 PROCEDURE — 81003 URINALYSIS AUTO W/O SCOPE: CPT | Performed by: SPECIALIST

## 2024-12-05 PROCEDURE — 99214 OFFICE O/P EST MOD 30 MIN: CPT | Performed by: SPECIALIST

## 2024-12-05 RX ORDER — VORTIOXETINE 10 MG/1
TABLET, FILM COATED ORAL
COMMUNITY
Start: 2024-11-16

## 2024-12-05 RX ORDER — BUPROPION HCL 150 MG
TABLET, EXTENDED RELEASE 24 HR ORAL
COMMUNITY
Start: 2024-12-01

## 2024-12-05 RX ORDER — DULOXETIN HYDROCHLORIDE 20 MG/1
CAPSULE, DELAYED RELEASE ORAL DAILY
COMMUNITY
Start: 2024-11-08

## 2024-12-05 RX ORDER — MELOXICAM 15 MG/1
TABLET ORAL
COMMUNITY
Start: 2024-10-28

## 2024-12-05 NOTE — PROGRESS NOTES
that the device was close to the skin surface but patient told that she does not have much subQ fat in this area and the Interstim pocket could not be placed any deeper.  Continue Kegel exercises for her mild stress urinary incontinence.        Kyle Dorantes MD [facility]  2024 5:00 PM     Quality Measure Documentation: N/A  Social History     Tobacco Use   Smoking Status Former    Current packs/day: 0.00    Types: Cigarettes, E-Cigarettes    Quit date: 1998    Years since quittin.9   Smokeless Tobacco Never     (If patient a smoker, smoking cessation counseling offered)

## 2024-12-17 ENCOUNTER — PATIENT MESSAGE (OUTPATIENT)
Dept: FAMILY MEDICINE CLINIC | Age: 44
End: 2024-12-17

## 2024-12-25 ENCOUNTER — PATIENT MESSAGE (OUTPATIENT)
Age: 44
End: 2024-12-25

## 2024-12-26 NOTE — TELEPHONE ENCOUNTER
Prabha, would you reach out to [employer] rep to let them know what is going on with patient and to have them reach out to her.  Dr. Dorantes also said to have patient turn off the interstim as well for time-being

## 2024-12-30 ENCOUNTER — ANESTHESIA EVENT (OUTPATIENT)
Dept: OPERATING ROOM | Age: 44
End: 2024-12-30
Payer: COMMERCIAL

## 2024-12-30 ENCOUNTER — OFFICE VISIT (OUTPATIENT)
Age: 44
End: 2024-12-30
Payer: COMMERCIAL

## 2024-12-30 VITALS — BODY MASS INDEX: 25.27 KG/M2 | WEIGHT: 161 LBS | HEIGHT: 67 IN

## 2024-12-30 DIAGNOSIS — N39.3 STRESS INCONTINENCE IN FEMALE: ICD-10-CM

## 2024-12-30 DIAGNOSIS — R35.0 URINARY FREQUENCY: ICD-10-CM

## 2024-12-30 DIAGNOSIS — N39.41 URGE INCONTINENCE: Primary | ICD-10-CM

## 2024-12-30 PROCEDURE — 99213 OFFICE O/P EST LOW 20 MIN: CPT | Performed by: SPECIALIST

## 2024-12-30 PROCEDURE — 81003 URINALYSIS AUTO W/O SCOPE: CPT | Performed by: SPECIALIST

## 2024-12-30 RX ORDER — PNV NO.95/FERROUS FUM/FOLIC AC 28MG-0.8MG
1000 TABLET ORAL DAILY
COMMUNITY

## 2024-12-30 RX ORDER — FLUTICASONE PROPIONATE 50 MCG
1 SPRAY, SUSPENSION (ML) NASAL DAILY
COMMUNITY

## 2024-12-30 RX ORDER — OXYCODONE AND ACETAMINOPHEN 5; 325 MG/1; MG/1
1 TABLET ORAL EVERY 6 HOURS PRN
Status: ON HOLD | COMMUNITY
End: 2024-12-31

## 2024-12-30 RX ORDER — ONDANSETRON 4 MG/1
4 TABLET, ORALLY DISINTEGRATING ORAL EVERY 8 HOURS PRN
COMMUNITY

## 2024-12-30 NOTE — H&P (VIEW-ONLY)
Kyle Dorantes MD FACS    [facility] Urology Office Progress Note    Patient:  Shena Pacheco  YOB: 1980  Date: 12/30/2024    HISTORY OF PRESENT ILLNESS:   The patient is a 44 y.o. female  Patient still having pain over her Interstim sacral neuromodulation device.  She is still having some drainage clear drainage from the wound but no erythema or swelling to suggest infection.  Discussed the options and she would like to proceed with Interstim sacral neuromodulation device explant of the lead and generator under GA.  Last AUA Symptom Score (QOL): 8 (3)    Summary of old records:   Frequency every 2 hours with occasional urgency, Urge urinary incontinence: 2 pops/d, tried trospium, ditropan and detrol wo success; 10/21/20 PVR = 0 mL; Myrbetriq 50 mg po qd, solifenacin (Vesicare) 5 mg qd (discussed trying 10 mg qd 2/24/21); switch to Gemtesa (vibegron) 75 mg qd 5/4/23; 9/7/23 Botox 100 IU-only lasted 2 months; 11/17/23, 5/3/24 Botox 200 IU (wears off in 3 months); 11/1/24 Interstim  11/24/20 VD: YEY=197 mL, IHD=7261 mL/d, TV=8.33 voids/d, FCM=575 mL/void, Nx0, NPi=26%, NUP=40 mL/hr  MALI: has seen PT  3/9/20 CT neg  Back surgery for sciatica, L5-S1 foraminotomy 11/9/20 ([facility])  Recurrent UTI\"s: 8/8/22 Serratia; 4/18/23, 5/1/23 Salmonella-Bactrim bid x 10 day then qd x 1 month; 1/30/24 Staph aureus; 6/12/24 Enterococcus    Additional History: none    Procedures Today: N/A    Urinalysis today:  Results for orders placed or performed in visit on 12/30/24   POCT Urinalysis No Micro (Auto)   Result Value Ref Range    Color, UA yellow     Clarity, UA clear     Glucose, UA POC neg mg/dL    Bilirubin, UA      Ketones, UA      Spec Grav, UA      Blood, UA POC neg     pH, UA      Protein, UA POC neg mg/dL    Urobilinogen, UA      Leukocytes, UA neg     Nitrite, UA neg        Last BUN and creatinine:  Lab Results   Component Value Date    BUN 13 06/22/2024     Lab Results   Component Value Date

## 2024-12-30 NOTE — PROGRESS NOTES
Kyle Dorantes MD FACS    [facility] Urology Office Progress Note    Patient:  Shena Pacheco  YOB: 1980  Date: 12/30/2024    HISTORY OF PRESENT ILLNESS:   The patient is a 44 y.o. female  Patient still having pain over her Interstim sacral neuromodulation device.  She is still having some drainage clear drainage from the wound but no erythema or swelling to suggest infection.  Discussed the options and she would like to proceed with Interstim sacral neuromodulation device explant of the lead and generator under GA.  Last AUA Symptom Score (QOL): 8 (3)    Summary of old records:   Frequency every 2 hours with occasional urgency, Urge urinary incontinence: 2 pops/d, tried trospium, ditropan and detrol wo success; 10/21/20 PVR = 0 mL; Myrbetriq 50 mg po qd, solifenacin (Vesicare) 5 mg qd (discussed trying 10 mg qd 2/24/21); switch to Gemtesa (vibegron) 75 mg qd 5/4/23; 9/7/23 Botox 100 IU-only lasted 2 months; 11/17/23, 5/3/24 Botox 200 IU (wears off in 3 months); 11/1/24 Interstim  11/24/20 VD: WWF=878 mL, ZYV=5982 mL/d, TV=8.33 voids/d, UTF=980 mL/void, Nx0, NPi=26%, NUP=40 mL/hr  MALI: has seen PT  3/9/20 CT neg  Back surgery for sciatica, L5-S1 foraminotomy 11/9/20 ([facility])  Recurrent UTI\"s: 8/8/22 Serratia; 4/18/23, 5/1/23 Salmonella-Bactrim bid x 10 day then qd x 1 month; 1/30/24 Staph aureus; 6/12/24 Enterococcus    Additional History: none    Procedures Today: N/A    Urinalysis today:  Results for orders placed or performed in visit on 12/30/24   POCT Urinalysis No Micro (Auto)   Result Value Ref Range    Color, UA yellow     Clarity, UA clear     Glucose, UA POC neg mg/dL    Bilirubin, UA      Ketones, UA      Spec Grav, UA      Blood, UA POC neg     pH, UA      Protein, UA POC neg mg/dL    Urobilinogen, UA      Leukocytes, UA neg     Nitrite, UA neg        Last BUN and creatinine:  Lab Results   Component Value Date    BUN 13 06/22/2024     Lab Results   Component Value Date

## 2024-12-31 ENCOUNTER — HOSPITAL ENCOUNTER (OUTPATIENT)
Age: 44
Setting detail: OUTPATIENT SURGERY
Discharge: HOME | End: 2024-12-31
Attending: SPECIALIST | Admitting: SPECIALIST
Payer: COMMERCIAL

## 2024-12-31 ENCOUNTER — ANESTHESIA (OUTPATIENT)
Dept: OPERATING ROOM | Age: 44
End: 2024-12-31
Payer: COMMERCIAL

## 2024-12-31 VITALS
HEART RATE: 73 BPM | RESPIRATION RATE: 18 BRPM | TEMPERATURE: 98.4 F | SYSTOLIC BLOOD PRESSURE: 126 MMHG | OXYGEN SATURATION: 99 % | WEIGHT: 162 LBS | DIASTOLIC BLOOD PRESSURE: 79 MMHG | HEIGHT: 67 IN | BODY MASS INDEX: 25.43 KG/M2

## 2024-12-31 DIAGNOSIS — G89.18 ACUTE POSTOPERATIVE PAIN: Primary | ICD-10-CM

## 2024-12-31 DIAGNOSIS — N39.41 URGE URINARY INCONTINENCE: ICD-10-CM

## 2024-12-31 DIAGNOSIS — T83.590S: ICD-10-CM

## 2024-12-31 PROCEDURE — 2500000003 HC RX 250 WO HCPCS: Performed by: SPECIALIST

## 2024-12-31 PROCEDURE — 87075 CULTR BACTERIA EXCEPT BLOOD: CPT

## 2024-12-31 PROCEDURE — 7100000011 HC PHASE II RECOVERY - ADDTL 15 MIN: Performed by: SPECIALIST

## 2024-12-31 PROCEDURE — 3700000000 HC ANESTHESIA ATTENDED CARE: Performed by: SPECIALIST

## 2024-12-31 PROCEDURE — 88300 SURGICAL PATH GROSS: CPT

## 2024-12-31 PROCEDURE — 3600000003 HC SURGERY LEVEL 3 BASE: Performed by: SPECIALIST

## 2024-12-31 PROCEDURE — 2580000003 HC RX 258

## 2024-12-31 PROCEDURE — 7100000000 HC PACU RECOVERY - FIRST 15 MIN: Performed by: SPECIALIST

## 2024-12-31 PROCEDURE — 2500000003 HC RX 250 WO HCPCS

## 2024-12-31 PROCEDURE — 2709999900 HC NON-CHARGEABLE SUPPLY: Performed by: SPECIALIST

## 2024-12-31 PROCEDURE — C1894 INTRO/SHEATH, NON-LASER: HCPCS | Performed by: SPECIALIST

## 2024-12-31 PROCEDURE — 3600000013 HC SURGERY LEVEL 3 ADDTL 15MIN: Performed by: SPECIALIST

## 2024-12-31 PROCEDURE — 7100000010 HC PHASE II RECOVERY - FIRST 15 MIN: Performed by: SPECIALIST

## 2024-12-31 PROCEDURE — 87205 SMEAR GRAM STAIN: CPT

## 2024-12-31 PROCEDURE — 6360000002 HC RX W HCPCS: Performed by: SPECIALIST

## 2024-12-31 PROCEDURE — 7100000001 HC PACU RECOVERY - ADDTL 15 MIN: Performed by: SPECIALIST

## 2024-12-31 PROCEDURE — 86403 PARTICLE AGGLUT ANTBDY SCRN: CPT

## 2024-12-31 PROCEDURE — 6360000002 HC RX W HCPCS

## 2024-12-31 PROCEDURE — 87070 CULTURE OTHR SPECIMN AEROBIC: CPT

## 2024-12-31 PROCEDURE — 3700000001 HC ADD 15 MINUTES (ANESTHESIA): Performed by: SPECIALIST

## 2024-12-31 PROCEDURE — 64595 REV/RMV PRPH SAC/GSTR NPG/R: CPT | Performed by: SPECIALIST

## 2024-12-31 PROCEDURE — 87186 SC STD MICRODIL/AGAR DIL: CPT

## 2024-12-31 PROCEDURE — 64585 REV/RMV PERPH NSTIM ELTRD RA: CPT | Performed by: SPECIALIST

## 2024-12-31 RX ORDER — NALOXONE HYDROCHLORIDE 0.4 MG/ML
INJECTION, SOLUTION INTRAMUSCULAR; INTRAVENOUS; SUBCUTANEOUS PRN
Status: DISCONTINUED | OUTPATIENT
Start: 2024-12-31 | End: 2024-12-31 | Stop reason: HOSPADM

## 2024-12-31 RX ORDER — DIPHENHYDRAMINE HYDROCHLORIDE 50 MG/ML
12.5 INJECTION INTRAMUSCULAR; INTRAVENOUS
Status: DISCONTINUED | OUTPATIENT
Start: 2024-12-31 | End: 2024-12-31 | Stop reason: HOSPADM

## 2024-12-31 RX ORDER — ONDANSETRON 2 MG/ML
4 INJECTION INTRAMUSCULAR; INTRAVENOUS
Status: DISCONTINUED | OUTPATIENT
Start: 2024-12-31 | End: 2024-12-31 | Stop reason: HOSPADM

## 2024-12-31 RX ORDER — FENTANYL CITRATE 50 UG/ML
INJECTION, SOLUTION INTRAMUSCULAR; INTRAVENOUS
Status: DISCONTINUED | OUTPATIENT
Start: 2024-12-31 | End: 2024-12-31 | Stop reason: SDUPTHER

## 2024-12-31 RX ORDER — ROCURONIUM BROMIDE 10 MG/ML
INJECTION, SOLUTION INTRAVENOUS
Status: DISCONTINUED | OUTPATIENT
Start: 2024-12-31 | End: 2024-12-31 | Stop reason: SDUPTHER

## 2024-12-31 RX ORDER — OXYCODONE AND ACETAMINOPHEN 5; 325 MG/1; MG/1
1 TABLET ORAL EVERY 6 HOURS PRN
Qty: 12 TABLET | Refills: 0 | Status: SHIPPED | OUTPATIENT
Start: 2024-12-31 | End: 2025-01-05

## 2024-12-31 RX ORDER — SODIUM CHLORIDE 0.9 % (FLUSH) 0.9 %
5-40 SYRINGE (ML) INJECTION PRN
Status: DISCONTINUED | OUTPATIENT
Start: 2024-12-31 | End: 2024-12-31 | Stop reason: HOSPADM

## 2024-12-31 RX ORDER — MEPERIDINE HYDROCHLORIDE 50 MG/ML
12.5 INJECTION INTRAMUSCULAR; INTRAVENOUS; SUBCUTANEOUS EVERY 5 MIN PRN
Status: DISCONTINUED | OUTPATIENT
Start: 2024-12-31 | End: 2024-12-31 | Stop reason: HOSPADM

## 2024-12-31 RX ORDER — MIDAZOLAM HYDROCHLORIDE 1 MG/ML
INJECTION, SOLUTION INTRAMUSCULAR; INTRAVENOUS
Status: DISCONTINUED | OUTPATIENT
Start: 2024-12-31 | End: 2024-12-31 | Stop reason: SDUPTHER

## 2024-12-31 RX ORDER — MAGNESIUM HYDROXIDE 1200 MG/15ML
LIQUID ORAL CONTINUOUS PRN
Status: DISCONTINUED | OUTPATIENT
Start: 2024-12-31 | End: 2024-12-31 | Stop reason: HOSPADM

## 2024-12-31 RX ORDER — SODIUM CHLORIDE, SODIUM LACTATE, POTASSIUM CHLORIDE, CALCIUM CHLORIDE 600; 310; 30; 20 MG/100ML; MG/100ML; MG/100ML; MG/100ML
INJECTION, SOLUTION INTRAVENOUS
Status: DISCONTINUED | OUTPATIENT
Start: 2024-12-31 | End: 2024-12-31 | Stop reason: SDUPTHER

## 2024-12-31 RX ORDER — LABETALOL HYDROCHLORIDE 5 MG/ML
10 INJECTION, SOLUTION INTRAVENOUS
Status: DISCONTINUED | OUTPATIENT
Start: 2024-12-31 | End: 2024-12-31 | Stop reason: HOSPADM

## 2024-12-31 RX ORDER — MORPHINE SULFATE 2 MG/ML
2 INJECTION, SOLUTION INTRAMUSCULAR; INTRAVENOUS EVERY 5 MIN PRN
Status: DISCONTINUED | OUTPATIENT
Start: 2024-12-31 | End: 2024-12-31 | Stop reason: HOSPADM

## 2024-12-31 RX ORDER — GENTAMICIN SULFATE 80 MG/50ML
80 INJECTION, SOLUTION INTRAVENOUS ONCE
Status: DISCONTINUED | OUTPATIENT
Start: 2024-12-31 | End: 2024-12-31 | Stop reason: HOSPADM

## 2024-12-31 RX ORDER — LIDOCAINE HYDROCHLORIDE AND EPINEPHRINE 10; 10 MG/ML; UG/ML
INJECTION, SOLUTION INFILTRATION; PERINEURAL PRN
Status: DISCONTINUED | OUTPATIENT
Start: 2024-12-31 | End: 2024-12-31 | Stop reason: HOSPADM

## 2024-12-31 RX ORDER — PROPOFOL 10 MG/ML
INJECTION, EMULSION INTRAVENOUS
Status: DISCONTINUED | OUTPATIENT
Start: 2024-12-31 | End: 2024-12-31 | Stop reason: SDUPTHER

## 2024-12-31 RX ORDER — PHENYLEPHRINE HCL IN 0.9% NACL 1 MG/10 ML
SYRINGE (ML) INTRAVENOUS
Status: DISCONTINUED | OUTPATIENT
Start: 2024-12-31 | End: 2024-12-31 | Stop reason: SDUPTHER

## 2024-12-31 RX ORDER — LIDOCAINE HYDROCHLORIDE 10 MG/ML
INJECTION, SOLUTION EPIDURAL; INFILTRATION; INTRACAUDAL; PERINEURAL
Status: DISCONTINUED | OUTPATIENT
Start: 2024-12-31 | End: 2024-12-31 | Stop reason: SDUPTHER

## 2024-12-31 RX ORDER — CEPHALEXIN 500 MG/1
500 CAPSULE ORAL 4 TIMES DAILY
Qty: 40 CAPSULE | Refills: 0 | Status: SHIPPED | OUTPATIENT
Start: 2024-12-31 | End: 2025-01-03 | Stop reason: ALTCHOICE

## 2024-12-31 RX ORDER — SODIUM CHLORIDE 9 MG/ML
INJECTION, SOLUTION INTRAVENOUS PRN
Status: DISCONTINUED | OUTPATIENT
Start: 2024-12-31 | End: 2024-12-31 | Stop reason: HOSPADM

## 2024-12-31 RX ORDER — SODIUM CHLORIDE 0.9 % (FLUSH) 0.9 %
5-40 SYRINGE (ML) INJECTION EVERY 12 HOURS SCHEDULED
Status: DISCONTINUED | OUTPATIENT
Start: 2024-12-31 | End: 2024-12-31 | Stop reason: HOSPADM

## 2024-12-31 RX ORDER — GENTAMICIN 40 MG/ML
INJECTION, SOLUTION INTRAMUSCULAR; INTRAVENOUS PRN
Status: DISCONTINUED | OUTPATIENT
Start: 2024-12-31 | End: 2024-12-31 | Stop reason: HOSPADM

## 2024-12-31 RX ORDER — MIDAZOLAM HYDROCHLORIDE 2 MG/2ML
2 INJECTION, SOLUTION INTRAMUSCULAR; INTRAVENOUS
Status: DISCONTINUED | OUTPATIENT
Start: 2024-12-31 | End: 2024-12-31 | Stop reason: HOSPADM

## 2024-12-31 RX ORDER — EPHEDRINE SULFATE/0.9% NACL/PF 25 MG/5 ML
SYRINGE (ML) INTRAVENOUS
Status: DISCONTINUED | OUTPATIENT
Start: 2024-12-31 | End: 2024-12-31 | Stop reason: SDUPTHER

## 2024-12-31 RX ORDER — METOCLOPRAMIDE HYDROCHLORIDE 5 MG/ML
10 INJECTION INTRAMUSCULAR; INTRAVENOUS
Status: DISCONTINUED | OUTPATIENT
Start: 2024-12-31 | End: 2024-12-31 | Stop reason: HOSPADM

## 2024-12-31 RX ADMIN — SODIUM CHLORIDE, POTASSIUM CHLORIDE, SODIUM LACTATE AND CALCIUM CHLORIDE: 600; 310; 30; 20 INJECTION, SOLUTION INTRAVENOUS at 10:45

## 2024-12-31 RX ADMIN — FENTANYL CITRATE 100 MCG: 50 INJECTION, SOLUTION INTRAMUSCULAR; INTRAVENOUS at 10:26

## 2024-12-31 RX ADMIN — MIDAZOLAM 2 MG: 1 INJECTION INTRAMUSCULAR; INTRAVENOUS at 10:22

## 2024-12-31 RX ADMIN — Medication 2000 MG: at 10:39

## 2024-12-31 RX ADMIN — SODIUM CHLORIDE, POTASSIUM CHLORIDE, SODIUM LACTATE AND CALCIUM CHLORIDE: 600; 310; 30; 20 INJECTION, SOLUTION INTRAVENOUS at 10:18

## 2024-12-31 RX ADMIN — EPHEDRINE SULFATE 5 MG: 5 INJECTION INTRAVENOUS at 10:44

## 2024-12-31 RX ADMIN — PROPOFOL 150 MG: 10 INJECTION, EMULSION INTRAVENOUS at 10:26

## 2024-12-31 RX ADMIN — Medication 100 MCG: at 10:36

## 2024-12-31 RX ADMIN — SUGAMMADEX 200 MG: 100 INJECTION, SOLUTION INTRAVENOUS at 11:16

## 2024-12-31 RX ADMIN — EPHEDRINE SULFATE 5 MG: 5 INJECTION INTRAVENOUS at 10:46

## 2024-12-31 RX ADMIN — ROCURONIUM BROMIDE 50 MG: 10 INJECTION, SOLUTION INTRAVENOUS at 10:26

## 2024-12-31 RX ADMIN — LIDOCAINE HYDROCHLORIDE 50 MG: 10 INJECTION, SOLUTION EPIDURAL; INFILTRATION; INTRACAUDAL; PERINEURAL at 10:26

## 2024-12-31 ASSESSMENT — LIFESTYLE VARIABLES: SMOKING_STATUS: 1

## 2024-12-31 NOTE — ANESTHESIA PRE PROCEDURE
BOTOX 200 UNITS performed by Kyle Dorantes MD at [facility] OR   • DILATION AND CURETTAGE OF UTERUS  2011   • LEEP  1998   • LUMBAR SPINE SURGERY  2020    foramenotomy   • OTHER SURGICAL HISTORY  03/26/2018    myelogram   • OR ARTHRODESIS POSTERIOR INTERBODY 1 NTRSPC LUMBAR N/A 06/25/2018    LUMBAR INTERBODY FUSION POSTERIOR L45 performed by Matt Corrigan MD at [facility] OR   • OR LAMINECTOMY W/O FFD > 2 VERT SEG LUMBAR N/A 04/23/2018    LUMBAR MICRO DISKECTOMY   L45 performed by Matt Corrigan MD at [facility] OR   • SPINAL CORD STIMULATOR IMPLANT  2020   • SPINAL CORD STIMULATOR SURGERY  2021    generator change   • SPINAL CORD STIMULATOR SURGERY  2024    exchange to new spinal cord stimulator   • STIMULATOR SURGERY N/A 10/18/2024    STIMULATOR INSERTION STAGE ONE WITH C-ARM AND [employer] INTERSTIM performed by Kyle Dorantes MD at [facility] OR   • STIMULATOR SURGERY N/A 11/01/2024    STIMULATOR INSERTION STAGE TWO [employer] INTERSTIM performed by Kyle Dorantes MD at [facility] OR   • TONSILLECTOMY  1988   • UPPER GASTROINTESTINAL ENDOSCOPY  05/18/2020   • UPPER GASTROINTESTINAL ENDOSCOPY  05/28/2020    polypectomy   • UPPER GASTROINTESTINAL ENDOSCOPY N/A 05/28/2020    EGD BIOPSY **CASE IN OR. WITH G.I. STAFF**  EGD ESOPHAGOGASTRODUODENOSCOPY performed by Felix Martin MD at [facility]   • UPPER GASTROINTESTINAL ENDOSCOPY  05/28/2020    EGD CONTROL HEMORRHAGE. APC performed by Felix Martin MD at [facility]   • URETHRAL SURGERY N/A 08/15/2023    CYSTOSCOPY BOTOX INJECTION  (100 UNITS) performed by Kyle Dorantes MD at [facility] OR   • VITRECTOMY Left 01/04/2024    VITRECTOMY 25 GAUGE, AIR GAS EXCHANGE SF6 AT 16%,  AIR FLUID EXCHANGE, BRILLIANT BLUE INJECTION, ICG performed by Xu Mixon MD at [facility] OR   • VITRECTOMY Right 2020   • VITRECTOMY Left 07/2024   • WISDOM TOOTH EXTRACTION  1997       Social History:    Social History     Tobacco Use   •

## 2024-12-31 NOTE — INTERVAL H&P NOTE
Update History & Physical    The patient's History and Physical of December 30, 2024 was reviewed with the patient and I examined the patient. There was no change. The surgical site was confirmed by the patient and me.     Plan: The risks, benefits, expected outcome, and alternative to the recommended procedure have been discussed with the patient. Patient understands and wants to proceed with the procedure.     Electronically signed by Tobi Bill MD on 12/31/2024 at 8:19 AM

## 2024-12-31 NOTE — OP NOTE
Operative Note      Patient: Shena Pacheco  YOB: 1980  MRN: [id number]    Date of Procedure: 12/31/2024    Pre-Op Diagnosis Codes:      * Urge urinary incontinence [N39.41]     * Inflammatory reaction due to urinary electronic stimulator device, sequela [T83.590S]    Post-Op Diagnosis: Same       Procedure(s):  INTERSTIM REMOVAL AND WOUND DEBRIDEMENT using Pulse-A-Vac (removal of generator and lead)    Surgeon(s):  Kyle Dorantes MD    Assistant:   Resident: Tobi Bill MD    Anesthesia: General    Estimated Blood Loss (mL): Minimal    Complications: None    Specimens:   ID Type Source Tests Collected by Time Destination   1 : WOUND CULTURE Swab Back CULTURE, ANAEROBIC AND AEROBIC Kyle Dorantes MD 12/31/2024 1048    A : INTERSTIM AND LEAD Hardware Tissue SURGICAL PATHOLOGY Kyle Dorantes MD 12/31/2024 1043        Implants:  * No implants in log *      Drains: * No LDAs found *    Findings:  Infection Present At Time Of Surgery (PATOS) (choose all levels that have infection present):  - Superficial Infection (skin/subcutaneous) present as evidenced by phlegmon and fluid consistent with infection  Other Findings: Aerobic and anaerobic cultures sent    Detailed Description of Procedure:   INDICATIONS FOR THE PROCEDURE:  Patient is here today with a history of Urge urinary incontinence s/p Interstim sacral neuromodulation device implant in the past.  The patient was having pain over the device and some persistent draining fluid and patient is here for a Interstim sacral neuromodulation lead and generator removal. After treatment options were discussed including risks, benefits, alternatives, goals and possible complications, the patient elected to proceed with today's procedure.  Patient was given Ancef 2 gm IV on call to OR.      NARRATIVE SUMMARY OF THE PROCEDURE:  The patient was brought to the OR. The patient was placed in prone position. A pillow was placed underneath her pelvis

## 2024-12-31 NOTE — ANESTHESIA POSTPROCEDURE EVALUATION
Department of Anesthesiology  Postprocedure Note    Patient: Shena Pacheco  MRN: [id number]  YOB: 1980  Date of evaluation: 12/31/2024    Procedure Summary       Date: 12/31/24 Room / Location: [address]    Anesthesia Start: 1019 Anesthesia Stop: 1127    Procedure: INTERSTIM REMOVAL AND WOUND DEBRIDEMENT Diagnosis:       Urge urinary incontinence      Inflammatory reaction due to urinary electronic stimulator device, sequela      (Urge urinary incontinence [N39.41])      (Inflammatory reaction due to urinary electronic stimulator device, sequela [T83.590S])    Surgeons: Kyle Dorantes MD Responsible Provider: Amna Dolan MD    Anesthesia Type: general ASA Status: 2            Anesthesia Type: No value filed.    Reva Phase I: Reva Score: 10    Reva Phase II:      Anesthesia Post Evaluation    Patient location during evaluation: PACU  Patient participation: complete - patient participated  Level of consciousness: awake  Airway patency: patent  Nausea & Vomiting: no nausea and no vomiting  Cardiovascular status: blood pressure returned to baseline  Respiratory status: acceptable  Hydration status: euvolemic  Pain management: adequate    No notable events documented.

## 2024-12-31 NOTE — DISCHARGE INSTRUCTIONS
Removal of Interstim Generator and Lead    OK to discharge home when recovered.  Medications e-prescribed  Change packing and dressing daily. Pack right back wound with 1/2 inch iodoform strips and cover with abdominal pad on daily basis.   Patient will follow up with Dr. Dorantes in 2 weeks    Call your doctor for the following:   Chills   Temperature greater than 101   Pain that is not tolerable despite taking pain medicine as ordered   There is increased swelling, redness or warmth at surgical site   There is increased drainage or bleeding from surgical site     No alcoholic beverages, no driving or operating machinery, no making important decisions for 24 hours.   You may have a normal diet but should eat lightly day of surgery.  Drink plenty of fluids

## 2025-01-02 ENCOUNTER — PATIENT MESSAGE (OUTPATIENT)
Age: 45
End: 2025-01-02

## 2025-01-02 ENCOUNTER — TELEPHONE (OUTPATIENT)
Age: 45
End: 2025-01-02

## 2025-01-02 LAB — SURGICAL PATHOLOGY REPORT: NORMAL

## 2025-01-02 NOTE — TELEPHONE ENCOUNTER
Patient returned call.  She said she is doing so much better.  She wonders how long pack to it and can she take a shower?  Appointment made for 1/9/25

## 2025-01-03 DIAGNOSIS — T83.590A: Primary | ICD-10-CM

## 2025-01-03 DIAGNOSIS — N39.3 STRESS INCONTINENCE IN FEMALE: Primary | ICD-10-CM

## 2025-01-03 LAB
MICROORGANISM SPEC CULT: ABNORMAL
MICROORGANISM SPEC CULT: ABNORMAL
MICROORGANISM/AGENT SPEC: ABNORMAL
MICROORGANISM/AGENT SPEC: ABNORMAL
SERVICE CMNT-IMP: ABNORMAL
SPECIMEN DESCRIPTION: ABNORMAL

## 2025-01-03 RX ORDER — SULFAMETHOXAZOLE AND TRIMETHOPRIM 800; 160 MG/1; MG/1
1 TABLET ORAL 2 TIMES DAILY
Qty: 20 TABLET | Refills: 0 | Status: SHIPPED | OUTPATIENT
Start: 2025-01-03

## 2025-01-03 RX ORDER — LEVOFLOXACIN 500 MG/1
500 TABLET, FILM COATED ORAL DAILY
Qty: 10 TABLET | Refills: 0 | Status: SHIPPED | OUTPATIENT
Start: 2025-01-03 | End: 2025-01-03 | Stop reason: CLARIF

## 2025-01-03 NOTE — TELEPHONE ENCOUNTER
Patient requesting a refill of iodoform strips be sent to [facility] on [address].  I assume she needs more gauze pads as well.  Looks like Dr. Tobi Bill sent the initial prescription but pateint does not think she will have enough to last through the weekend.  Will call patient regarding antibiotic switch as well from wound culture result

## 2025-01-05 ASSESSMENT — PATIENT HEALTH QUESTIONNAIRE - PHQ9
2. FEELING DOWN, DEPRESSED OR HOPELESS: SEVERAL DAYS
SUM OF ALL RESPONSES TO PHQ QUESTIONS 1-9: 2
1. LITTLE INTEREST OR PLEASURE IN DOING THINGS: SEVERAL DAYS
SUM OF ALL RESPONSES TO PHQ9 QUESTIONS 1 & 2: 2
SUM OF ALL RESPONSES TO PHQ QUESTIONS 1-9: 2
SUM OF ALL RESPONSES TO PHQ9 QUESTIONS 1 & 2: 2
2. FEELING DOWN, DEPRESSED OR HOPELESS: SEVERAL DAYS
SUM OF ALL RESPONSES TO PHQ QUESTIONS 1-9: 2
1. LITTLE INTEREST OR PLEASURE IN DOING THINGS: SEVERAL DAYS
SUM OF ALL RESPONSES TO PHQ QUESTIONS 1-9: 2

## 2025-01-06 ENCOUNTER — OFFICE VISIT (OUTPATIENT)
Dept: FAMILY MEDICINE CLINIC | Age: 45
End: 2025-01-06
Payer: COMMERCIAL

## 2025-01-06 VITALS
WEIGHT: 169 LBS | HEIGHT: 67 IN | BODY MASS INDEX: 26.53 KG/M2 | TEMPERATURE: 97 F | SYSTOLIC BLOOD PRESSURE: 110 MMHG | HEART RATE: 68 BPM | OXYGEN SATURATION: 99 % | DIASTOLIC BLOOD PRESSURE: 60 MMHG

## 2025-01-06 DIAGNOSIS — N39.3 STRESS INCONTINENCE IN FEMALE: Primary | ICD-10-CM

## 2025-01-06 DIAGNOSIS — Z78.9 PRESENCE OF SURGICAL INCISION: ICD-10-CM

## 2025-01-06 LAB — HCG, PREGNANCY URINE (POC): NEGATIVE

## 2025-01-06 PROCEDURE — 99213 OFFICE O/P EST LOW 20 MIN: CPT | Performed by: PHYSICIAN ASSISTANT

## 2025-01-06 ASSESSMENT — ENCOUNTER SYMPTOMS: COLOR CHANGE: 0

## 2025-01-06 NOTE — PROGRESS NOTES
Visit Information    Have you changed or started any medications since your last visit including any over-the-counter medicines, vitamins, or herbal medicines? no   Are you having any side effects from any of your medications? -  no  Have you stopped taking any of your medications? Is so, why? -  no    Have you seen any other physician or provider since your last visit? No  Have you had any other diagnostic tests since your last visit? No  Have you been seen in the emergency room and/or had an admission to a hospital since we last saw you? No  Have you had your routine dental cleaning in the past 6 months? no    Have you activated your [employer] account? If not, what are your barriers? Yes     Patient Care Team:  Renee Valdez PA-C as PCP - General (Family Medicine)  Renee Valdez PA-C as PCP - Empaneled Provider  Kyle Dorantes MD as Consulting Physician (Urology)    Medical History Review  Past Medical, Family, and Social History reviewed and  contribute to the patient presenting condition    Health Maintenance   Topic Date Due    Varicella vaccine (1 of 2 - 13+ 2-dose series) Never done    Hepatitis C screen  Never done    Hepatitis B vaccine (1 of 3 - 19+ 3-dose series) Never done    Flu vaccine (1) Never done    COVID-19 Vaccine (2 - 2023-24 season) 09/01/2024    Depression Screen  01/05/2026    Breast cancer screen  01/30/2026    Cervical cancer screen  10/13/2026    Lipids  02/14/2027    DTaP/Tdap/Td vaccine (2 - Td or Tdap) 08/28/2028    HIV screen  Completed    Hepatitis A vaccine  Aged Out    Hib vaccine  Aged Out    HPV vaccine  Aged Out    Polio vaccine  Aged Out    Meningococcal (ACWY) vaccine  Aged Out    Pneumococcal 0-64 years Vaccine  Aged Out    Diabetes screen  Discontinued       
packs/day: 1 pack/day for 24.0 years (24.0 ttl pk-yrs)     Types: Cigarettes, E-Cigarettes     Start date: 1/1/1998     Quit date: 1/1/2022     Years since quitting: 3.0    Smokeless tobacco: Never   Substance Use Topics    Alcohol use: Yes     Alcohol/week: 1.0 standard drink of alcohol     Types: 1 Glasses of wine per week     Comment: rare      Current Outpatient Medications   Medication Sig Dispense Refill    sulfamethoxazole-trimethoprim (BACTRIM DS) 800-160 MG per tablet Take 1 tablet by mouth 2 times daily 20 tablet 0    Gauze Pads & Dressings 4\"X4\" PADS 2 Pads by Does not apply route 2 times daily Abdominal pads 40 each 1    Vitamin E 670 MG (1000 UT) CAPS Take 1,000 Units by mouth daily      fluticasone (FLONASE) 50 MCG/ACT nasal spray 1 spray by Each Nostril route daily      Probiotic Product (PROBIOTIC PO) Take 1 capsule by mouth daily      ondansetron (ZOFRAN-ODT) 4 MG disintegrating tablet Take 1 tablet by mouth every 8 hours as needed for Nausea      WELLBUTRIN  MG extended release tablet Take 1 tablet by mouth Daily with lunch      TRINTELLIX 10 MG TABS tablet Take 1 tablet by mouth Daily with lunch      WEGOVY 1 MG/0.5ML SOAJ SC injection INJECT 1MG SUBCUTANEOUSLY  ONCE WEEKLY (EVERY 7 DAYS) (Patient taking differently: Inject 1 mg into the skin every 7 days Sunday) 6 mL 0    ibuprofen (ADVIL;MOTRIN) 800 MG tablet TAKE 1 TABLET BY MOUTH EVERY 6 HOURS AS NEEDED FOR PAIN WITH FOOD OR MILK 30 tablet 0    azelastine (ASTELIN) 0.1 % nasal spray 1 spray by Nasal route 2 times daily Use in each nostril as directed 60 mL 1    clonazePAM (KLONOPIN) 0.5 MG tablet Take 2 tablets by mouth at bedtime.      magnesium oxide (MAG-OX) 400 MG tablet Take 1 tablet by mouth nightly at bedtime.      B Complex Vitamins (VITAMIN B COMPLEX PO) Take 1 tablet by mouth in the morning and at bedtime      amphetamine-dextroamphetamine (ADDERALL) 20 MG tablet Take 1 tablet by mouth 2 times daily. Sometimes takes together ,

## 2025-01-07 NOTE — DISCHARGE INSTRUCTIONS
[facility] -Phone: [phone] Fax: [phone]   Visit  Discharge Instructions / Physician Orders    DATE: 1/10/2025     Home Care: [facility]- New Referral     SUPPLIES ORDERED THRU: [facility]     Wound Location: Right Lower Back     Cleanse with: Liquid antibacterial soap and water, rinse well      Dressing Orders: Skin Prep and Drape around wound, Black Foam into wound- Bridge to hip (ensure no sponge is in contact with intact skin), Continuous Suction at 125mmHg          Continue Iodoform, dry dressing until vac is received     Frequency: Change Vac on Monday, Wednesday, Friday (Wound Care Center will change on appointment days)     Additional Orders: Increase protein to diet (meat, cheese, eggs, fish, peanut butter, nuts and beans)    Your next appointment with Wound Care Center is in 1 week     (Please note your next appointment above and if you are unable to keep, kindly give a 24 hour notice. Thank you.)  If more than 15 min late we cannot guarantee you will be seen due to clinician schedule  Per Policy, Excessive cancellation will call for dismissal from program.     If you experience any of the following, please call the Wound Care Center during business hours:  [phone]     * Increase in Pain  * Temperature over 101  * Increase in drainage from your wound  * Drainage with a foul odor  * Bleeding  * Increase in swelling  * Need for compression bandage changes due to slippage, breakthrough drainage.     If you need medical attention outside of the business hours of the Wound Care Centers please contact your PCP or go to the an urgent care or emergency department     The information contained in the After Visit Summary has been reviewed with me, the patient and/or responsible adult, by my health care provider(s). I had the opportunity to ask questions regarding this information. I have elected to receive;      []After Visit Summary  [x]Comprehensive Discharge

## 2025-01-09 ENCOUNTER — TELEPHONE (OUTPATIENT)
Age: 45
End: 2025-01-09

## 2025-01-09 PROBLEM — R40.0 DAYTIME SOMNOLENCE: Status: ACTIVE | Noted: 2024-09-18

## 2025-01-09 PROBLEM — R20.0 NUMBNESS: Status: ACTIVE | Noted: 2024-05-28

## 2025-01-09 PROBLEM — G47.01 INSOMNIA DUE TO MEDICAL CONDITION: Status: ACTIVE | Noted: 2024-09-18

## 2025-01-09 PROBLEM — R52 PAIN: Status: ACTIVE | Noted: 2024-05-28

## 2025-01-09 PROBLEM — R11.2 NAUSEA AND VOMITING: Status: ACTIVE | Noted: 2024-11-30

## 2025-01-09 PROBLEM — G47.33 OBSTRUCTIVE SLEEP APNEA SYNDROME: Status: ACTIVE | Noted: 2024-10-08

## 2025-01-09 PROBLEM — G47.26 CIRCADIAN RHYTHM SLEEP DISORDER, SHIFT WORK TYPE: Status: ACTIVE | Noted: 2024-09-18

## 2025-01-09 PROBLEM — S63.519A SPRAIN OF SCAPHOLUNATE LIGAMENT: Status: ACTIVE | Noted: 2024-10-15

## 2025-01-09 PROBLEM — M54.17 LUMBOSACRAL RADICULOPATHY AT L5: Status: ACTIVE | Noted: 2022-07-26

## 2025-01-09 PROBLEM — G25.81 RESTLESS LEGS SYNDROME: Status: ACTIVE | Noted: 2022-04-19

## 2025-01-09 PROBLEM — R25.2 MUSCLE CRAMPS: Status: ACTIVE | Noted: 2022-04-19

## 2025-01-09 PROBLEM — M25.531 RIGHT WRIST PAIN: Status: ACTIVE | Noted: 2023-12-27

## 2025-01-09 PROBLEM — R53.1 ASTHENIA: Status: ACTIVE | Noted: 2024-05-28

## 2025-01-09 NOTE — TELEPHONE ENCOUNTER
Writer called [facility] to see what they would need from us for patient to received home health wound care services.  - Demograhpics sheet  - Last OV note  - Referral to [facility]  - What exactly Dr. Dorantes would like done  - Also, to include call back # &   - [facility] Intake Fax# [phone]

## 2025-01-09 NOTE — TELEPHONE ENCOUNTER
Patient is having difficulty finding iodoform and other supplies at pharmacy.  Apparently, everywhere she has called did not have them in stock.  Is there something else she could use that might be readily available?  I pointed her to [employer] in the meantime while we figure this out. Please advise  
Phones at office were not messed up, writer was on other line with another patient as she repeatedly called. Able to speak with patient and she was rescheduled for Monday 1/13/24 at her request as she can not do morning.   
no

## 2025-01-09 NOTE — PROGRESS NOTES
The patient is being seen for a physical exam.   The patient was asked if they would like a chaperone in the room during the exam..  The patient has respectfully {Blank single:[id number]::\"declined\",\"accepted\"}

## 2025-01-10 ENCOUNTER — HOSPITAL ENCOUNTER (OUTPATIENT)
Dept: WOUND CARE | Age: 45
Discharge: HOME | End: 2025-01-10
Payer: COMMERCIAL

## 2025-01-10 VITALS
HEART RATE: 72 BPM | BODY MASS INDEX: 26.53 KG/M2 | HEIGHT: 67 IN | WEIGHT: 169 LBS | DIASTOLIC BLOOD PRESSURE: 41 MMHG | SYSTOLIC BLOOD PRESSURE: 86 MMHG | RESPIRATION RATE: 18 BRPM | TEMPERATURE: 97.8 F

## 2025-01-10 DIAGNOSIS — T81.89XD NONHEALING SURGICAL WOUND, SUBSEQUENT ENCOUNTER: ICD-10-CM

## 2025-01-10 DIAGNOSIS — S21.201D BACK WOUND, RIGHT, SUBSEQUENT ENCOUNTER: Primary | ICD-10-CM

## 2025-01-10 PROCEDURE — 99213 OFFICE O/P EST LOW 20 MIN: CPT

## 2025-01-10 PROCEDURE — 11042 DBRDMT SUBQ TIS 1ST 20SQCM/<: CPT

## 2025-01-10 RX ORDER — LIDOCAINE HYDROCHLORIDE 40 MG/ML
SOLUTION TOPICAL ONCE
OUTPATIENT
Start: 2025-01-10 | End: 2025-01-10

## 2025-01-10 RX ORDER — LIDOCAINE HYDROCHLORIDE 20 MG/ML
JELLY TOPICAL ONCE
OUTPATIENT
Start: 2025-01-10 | End: 2025-01-10

## 2025-01-10 ASSESSMENT — ENCOUNTER SYMPTOMS
SHORTNESS OF BREATH: 0
VOMITING: 0
RHINORRHEA: 0
NAUSEA: 0
DIARRHEA: 0
COUGH: 0

## 2025-01-10 ASSESSMENT — PAIN SCALES - GENERAL: PAINLEVEL_OUTOF10: 0

## 2025-01-10 NOTE — PROGRESS NOTES
[facility]   Progress Note and Procedure Note      Shena Pacheco  MEDICAL RECORD NUMBER:  [id number]  AGE: 44 y.o.   GENDER: female  : 1980  EPISODE DATE:  1/10/2025    Subjective:     Chief Complaint   Patient presents with    Wound Check     Buttocks         HISTORY of PRESENT ILLNESS HPI     Shena Pacheco is a 44 y.o. female who presents today for wound/ulcer evaluation.   History of Wound Context: present for initial wound clinic evaluation of right low back wound after she had bladder stimulator removed 2024. Wound has not healed. Following with urology Dr Dorantes with next appointment 2025. He sent referral to [facility] yesterday.   Wound/Ulcer Pain Timing/Severity: none  Quality of pain: N/A  Severity:  0 / 10   Modifying Factors: None  Associated Signs/Symptoms: none    Ulcer Identification:  Ulcer Type: non-healing surgical  Contributing Factors: none         PAST MEDICAL HISTORY        Diagnosis Date    ADD (attention deficit disorder)     Anxiety     Cancer (HCC)     COVID-19 vaccine series completed     no boosters    Dental anomaly     permanent retainer in place    Depression     Episode of syncope 2024    History of cervical cancer 1998    History of colon polyps     x 1 precancerous polyp    History of gastroesophageal reflux (GERD)     \"mild\"  no medications    Hx of blood clots     right leg superficial blood clot , did not require treatment , tested d/t leg swelling    Lumbosacral disc disease     with chronic pain    Neuroendocrine neoplasm of duodenum     OAB (overactive bladder) 2017    Obesity (BMI 30-39.9)     prior    JUICE (obstructive sleep apnea)     tried cpap but did not tolerate    Osteoarthritis     Polyarthralgia     Primary ovarian insufficiency     PUD (peptic ulcer disease)     Under care of team     Urology , Dr. Haas , last seen 2024    Under care of team     Psychiatry ,

## 2025-01-11 ENCOUNTER — PATIENT MESSAGE (OUTPATIENT)
Dept: FAMILY MEDICINE CLINIC | Age: 45
End: 2025-01-11

## 2025-01-11 DIAGNOSIS — R11.2 NAUSEA AND VOMITING, UNSPECIFIED VOMITING TYPE: Primary | ICD-10-CM

## 2025-01-13 ENCOUNTER — HOSPITAL ENCOUNTER (OUTPATIENT)
Age: 45
Setting detail: SPECIMEN
Discharge: HOME | End: 2025-01-13

## 2025-01-13 ENCOUNTER — OFFICE VISIT (OUTPATIENT)
Age: 45
End: 2025-01-13

## 2025-01-13 DIAGNOSIS — N39.0 RECURRENT UTI: ICD-10-CM

## 2025-01-13 DIAGNOSIS — R35.0 URINARY FREQUENCY: ICD-10-CM

## 2025-01-13 DIAGNOSIS — N39.41 URGE INCONTINENCE: Primary | ICD-10-CM

## 2025-01-13 DIAGNOSIS — R11.2 NAUSEA AND VOMITING, UNSPECIFIED VOMITING TYPE: ICD-10-CM

## 2025-01-13 DIAGNOSIS — N39.3 STRESS INCONTINENCE IN FEMALE: ICD-10-CM

## 2025-01-13 LAB
ANION GAP SERPL CALCULATED.3IONS-SCNC: 11 MMOL/L (ref 9–16)
BASOPHILS # BLD: <0.03 K/UL (ref 0–0.2)
BASOPHILS NFR BLD: 0 % (ref 0–2)
BUN SERPL-MCNC: 13 MG/DL (ref 6–20)
CALCIUM SERPL-MCNC: 9.7 MG/DL (ref 8.6–10.4)
CHLORIDE SERPL-SCNC: 103 MMOL/L (ref 98–107)
CO2 SERPL-SCNC: 26 MMOL/L (ref 20–31)
CREAT SERPL-MCNC: 0.8 MG/DL (ref 0.6–0.9)
EOSINOPHIL # BLD: 0.09 K/UL (ref 0–0.44)
EOSINOPHILS RELATIVE PERCENT: 2 % (ref 1–4)
ERYTHROCYTE [DISTWIDTH] IN BLOOD BY AUTOMATED COUNT: 12.3 % (ref 11.8–14.4)
GFR, ESTIMATED: >90 ML/MIN/1.73M2
GLUCOSE SERPL-MCNC: 83 MG/DL (ref 74–99)
HCT VFR BLD AUTO: 41.7 % (ref 36.3–47.1)
HGB BLD-MCNC: 13.5 G/DL (ref 11.9–15.1)
IMM GRANULOCYTES # BLD AUTO: <0.03 K/UL (ref 0–0.3)
IMM GRANULOCYTES NFR BLD: 0 %
LYMPHOCYTES NFR BLD: 1.36 K/UL (ref 1.1–3.7)
LYMPHOCYTES RELATIVE PERCENT: 29 % (ref 24–43)
MCH RBC QN AUTO: 29.2 PG (ref 25.2–33.5)
MCHC RBC AUTO-ENTMCNC: 32.4 G/DL (ref 28.4–34.8)
MCV RBC AUTO: 90.3 FL (ref 82.6–102.9)
MONOCYTES NFR BLD: 0.35 K/UL (ref 0.1–1.2)
MONOCYTES NFR BLD: 7 % (ref 3–12)
NEUTROPHILS NFR BLD: 62 % (ref 36–65)
NEUTS SEG NFR BLD: 2.94 K/UL (ref 1.5–8.1)
NRBC BLD-RTO: 0 PER 100 WBC
PLATELET # BLD AUTO: 271 K/UL (ref 138–453)
PMV BLD AUTO: 10.5 FL (ref 8.1–13.5)
POTASSIUM SERPL-SCNC: 4.8 MMOL/L (ref 3.7–5.3)
PROCALCITONIN SERPL-MCNC: 0.05 NG/ML (ref 0–0.09)
RBC # BLD AUTO: 4.62 M/UL (ref 3.95–5.11)
SODIUM SERPL-SCNC: 140 MMOL/L (ref 136–145)
WBC OTHER # BLD: 4.8 K/UL (ref 3.5–11.3)

## 2025-01-13 PROCEDURE — 99024 POSTOP FOLLOW-UP VISIT: CPT | Performed by: SPECIALIST

## 2025-01-13 NOTE — PROGRESS NOTES
Kyle Dorantes MD [facility] Urology Office Progress Note    Patient:  Shena Pacheco  YOB: 1980  Date: 1/13/2025    HISTORY OF PRESENT ILLNESS:   The patient is a 44 y.o. female  Patient here s/p 12/31/24 Interstim sacral neuromodulation device explant.  Patient's wound left open to close by secondary intention due to Staph aureus infection.  She has taken Bactrim but has not tolerated it very well.  This is her last day.  Patient being seen in the [facility].  Would recommend re-culture patient when wound packing changed this Friday and the Wound Care Center.  Last AUA Symptom Score (QOL): 8 (3)    Summary of old records:   Frequency every 2 hours with occasional urgency, Urge urinary incontinence: 2 pops/d, tried trospium, ditropan and detrol wo success; 10/21/20 PVR = 0 mL; Myrbetriq 50 mg po qd, solifenacin (Vesicare) 5 mg qd (discussed trying 10 mg qd 2/24/21); switch to Gemtesa (vibegron) 75 mg qd 5/4/23; 9/7/23 Botox 100 IU-only lasted 2 months; 11/17/23, 5/3/24 Botox 200 IU (wears off in 3 months); 11/1/24 Interstim; 12/31/24 removal (Staph aureus, wound left open); going to [facility]ound [facility] (Flaca Carrington CNP)  11/24/20 VD: TXS=971 mL, USR=4757 mL/d, TV=8.33 voids/d, QYY=751 mL/void, Nx0, NPi=26%, NUP=40 mL/hr  MALI: has seen PT  3/9/20 CT neg  Back surgery for sciatica, L5-S1 foraminotomy 11/9/20 ([facility])  Recurrent UTI\"s: 8/8/22 Serratia; 4/18/23, 5/1/23 Salmonella-Bactrim bid x 10 day then qd x 1 month; 1/30/24 Staph aureus; 6/12/24 Enterococcus    Additional History: none    Procedures Today: N/A    Urinalysis today:  No results found for this visit on 01/13/25.    Last BUN and creatinine:  Lab Results   Component Value Date    BUN 13 06/22/2024     Lab Results   Component Value Date    CREATININE 0.5 06/22/2024       Last CBC:  Lab Results   Component Value Date    WBC 6.2 06/22/2024    HGB 12.7 06/22/2024    HCT 39.1 06/22/2024    MCV

## 2025-01-15 ENCOUNTER — OFFICE VISIT (OUTPATIENT)
Dept: OBGYN CLINIC | Age: 45
End: 2025-01-15
Payer: COMMERCIAL

## 2025-01-15 VITALS
SYSTOLIC BLOOD PRESSURE: 99 MMHG | WEIGHT: 166.2 LBS | DIASTOLIC BLOOD PRESSURE: 59 MMHG | HEART RATE: 52 BPM | HEIGHT: 67 IN | BODY MASS INDEX: 26.09 KG/M2

## 2025-01-15 DIAGNOSIS — Z01.419 ENCOUNTER FOR WELL WOMAN EXAM: Primary | ICD-10-CM

## 2025-01-15 DIAGNOSIS — Z98.82 H/O BILATERAL BREAST IMPLANTS: ICD-10-CM

## 2025-01-15 DIAGNOSIS — E28.39 PRIMARY OVARIAN INSUFFICIENCY: ICD-10-CM

## 2025-01-15 DIAGNOSIS — Z12.31 ENCOUNTER FOR SCREENING MAMMOGRAM FOR BREAST CANCER: ICD-10-CM

## 2025-01-15 DIAGNOSIS — Z98.890 HISTORY OF LOOP ELECTRICAL EXCISION PROCEDURE (LEEP): ICD-10-CM

## 2025-01-15 PROCEDURE — 99396 PREV VISIT EST AGE 40-64: CPT

## 2025-01-15 NOTE — PROGRESS NOTES
[facility] [place]  [address]  [address]  [place] [address]  Dept: [phone]           Annual gynecologic exam    Patient: Shena Pacheco  Primary Care Physician: Renee Valdez PA-C   Chief Complaint   Patient presents with    Annual Exam     HPI: Sehna Pacheco is a 44 y.o.  who presents today as a returning patient for her annual women's wellness exam. She has no children. She works at [employer]. Last  seen in 2024 at which time we discussed her history of POI and goal of supplementing estrogen for bone and cardiac health. She tried contraceptive patch but had severe mood symptoms with this and opted to not pursue estrogen therapy at that time due to other complex health issues and needs.     Has been working closely with urology - Recently developed nonhealing surgical wound which she is seeing wound care for. Getting wound vac placed today. Planning to switch urologists to get a second opinion regarding treatment options for recurrent UTIs.  Prev tried vaginal estrogen a year or more ago    Recently dealing with GI upset. Her Appetite has been decreased and she has been nauseas. Stopped wegovy. Psychiatrist follow up planned - now realizing Trintellix may have caused nausea -currently states nausea improved yesterday after stopped trintellix    OBSTETRICAL & GYNECOLOGICAL HISTORY:  OB History    Para Term  AB Living   2 0 0 0 2 0   SAB IAB Ectopic Molar Multiple Live Births   2 0 0 0 0 0      # Outcome Date GA Lbr Mazin/2nd Weight Sex Type Anes PTL Lv   2 SAB            1 SAB              Patient's last menstrual period was 2024 (within days).  Had one period with the patch was being used and has not had any since.   Still resistent to starting systemic estrogen due to concern for mood side effects. She struggles with depression. She is taking calcium and vit d    Sexually Active: with

## 2025-01-16 NOTE — DISCHARGE INSTRUCTIONS
[facility] -Phone: [phone] Fax: [phone]    Visit  Discharge Instructions / Physician Orders     DATE: 1/17/2025     Home Care: [facility]     SUPPLIES ORDERED THRU: [facility]     Wound Location: Right Lower Back     Cleanse with: Liquid antibacterial soap and water, rinse well      Dressing Orders: Skin Prep and Drape around wound, Black Foam into wound- Bridge to hip (ensure no sponge is in contact with intact skin), Continuous Suction at 125mmHg          If an issue with wound vac, remove and place saline moistened gauze, and cover with dry dressing     Frequency: Change Vac on Monday, Wednesday, Friday (Wound Care Center will change on appointment days)     Additional Orders: Increase protein to diet (meat, cheese, eggs, fish, peanut butter, nuts and beans)  Culture Taken 1/17/25     Your next appointment with Wound Care Center is in 1 week     (Please note your next appointment above and if you are unable to keep, kindly give a 24 hour notice. Thank you.)  If more than 15 min late we cannot guarantee you will be seen due to clinician schedule  Per Policy, Excessive cancellation will call for dismissal from program.     If you experience any of the following, please call the Wound Care Center during business hours:  [phone]     * Increase in Pain  * Temperature over 101  * Increase in drainage from your wound  * Drainage with a foul odor  * Bleeding  * Increase in swelling  * Need for compression bandage changes due to slippage, breakthrough drainage.     If you need medical attention outside of the business hours of the Wound Care Centers please contact your PCP or go to the an urgent care or emergency department     The information contained in the After Visit Summary has been reviewed with me, the patient and/or responsible adult, by my health care provider(s). I had the opportunity to ask questions regarding this information. I have elected to receive;

## 2025-01-17 ENCOUNTER — HOSPITAL ENCOUNTER (OUTPATIENT)
Dept: WOUND CARE | Age: 45
Discharge: HOME | End: 2025-01-17
Payer: COMMERCIAL

## 2025-01-17 VITALS
WEIGHT: 166 LBS | DIASTOLIC BLOOD PRESSURE: 42 MMHG | HEIGHT: 67 IN | BODY MASS INDEX: 26.06 KG/M2 | RESPIRATION RATE: 18 BRPM | TEMPERATURE: 97.5 F | HEART RATE: 92 BPM | SYSTOLIC BLOOD PRESSURE: 113 MMHG

## 2025-01-17 DIAGNOSIS — T81.89XD NONHEALING SURGICAL WOUND, SUBSEQUENT ENCOUNTER: ICD-10-CM

## 2025-01-17 DIAGNOSIS — S21.201D BACK WOUND, RIGHT, SUBSEQUENT ENCOUNTER: Primary | ICD-10-CM

## 2025-01-17 PROCEDURE — 87075 CULTR BACTERIA EXCEPT BLOOD: CPT

## 2025-01-17 PROCEDURE — 11042 DBRDMT SUBQ TIS 1ST 20SQCM/<: CPT

## 2025-01-17 PROCEDURE — 87176 TISSUE HOMOGENIZATION CULTR: CPT

## 2025-01-17 PROCEDURE — 87070 CULTURE OTHR SPECIMN AEROBIC: CPT

## 2025-01-17 PROCEDURE — 97605 NEG PRS WND THER DME<=50SQCM: CPT

## 2025-01-17 PROCEDURE — 87205 SMEAR GRAM STAIN: CPT

## 2025-01-17 RX ORDER — LIDOCAINE HYDROCHLORIDE 20 MG/ML
JELLY TOPICAL ONCE
Status: COMPLETED | OUTPATIENT
Start: 2025-01-17 | End: 2025-01-17

## 2025-01-17 RX ORDER — LIDOCAINE HYDROCHLORIDE 40 MG/ML
SOLUTION TOPICAL ONCE
OUTPATIENT
Start: 2025-01-17 | End: 2025-01-17

## 2025-01-17 RX ORDER — LIDOCAINE HYDROCHLORIDE 20 MG/ML
JELLY TOPICAL ONCE
OUTPATIENT
Start: 2025-01-17 | End: 2025-01-17

## 2025-01-17 RX ADMIN — LIDOCAINE HYDROCHLORIDE 6 ML: 20 JELLY TOPICAL at 11:03

## 2025-01-17 ASSESSMENT — ENCOUNTER SYMPTOMS
SHORTNESS OF BREATH: 0
NAUSEA: 0
RHINORRHEA: 0
COUGH: 0
VOMITING: 0
DIARRHEA: 0

## 2025-01-17 ASSESSMENT — PAIN SCALES - GENERAL
PAINLEVEL_OUTOF10: 0
PAINLEVEL_OUTOF10: 0

## 2025-01-17 NOTE — PROGRESS NOTES
Negative Pressure    NAME:  Shena Pacheco  YOB: 1980  MEDICAL RECORD NUMBER:  [id number]  DATE:  1/17/2025    Applied Negative Pressure to right buttocks wound(s)/ulcer(s).  [x] Applied skin barrier prep to luis daniel-wound.   [x] Cut strips of plastic drape to picture frame wound so that luis daniel-wound is     covered with the drape.   [x] If bridging dressing to less prominent site, cover any intact skin that will come in contact with the Negative Pressure Therapy sponge, gauze or channel drain with plastic drape.  The sponge should never touch intact skin.   [x] Cut sponge, gauze or channel drain to size which will fit into the wound/ulcer bed without being forced.   [x] Be sure the sponge is large enough to hold the entire round plastic flange which is attached to the tubing.  Never allow flange to be larger than the sponge or it will produce suction damaging intact skin.  Total number of individual pieces of foam used within the wound bed: 1    [x] If bridging the dressing away from the primary site, be sure the bridge leads to a piece of sponge large enough to hold the entire flange without allowing any of the flange to overlap onto intact skin.   [x] Covered sponge, gauze or channel drain with plastic drape.   [x] Cut a hole in this plastic drape directly over the sponge the same size as the plastic drain tubing.   [x] Removed plastic liner from flange and apply it directly over the hole you cut.   [x] Removed the plastic cover from the flange.   [x] Attached the tubing to the wound/ulcer Negative Pressure Therapy and turn it on to be sure a vacuum is created and that there are no leaks.   [x] If air leaks occur, use plastic drape to patch them.   [x] Secured Negative Pressure Therapy dressing with ace wrap loosely if located on an extremity. Maintain tubing outside of ace wrap. Tubing must not exert pressure on intact skin.    Applied per  Guidelines      Electronically signed by Whit ROBLES 
pupils equal, round and conjunctivae normal  Pulmonary/Chest: normal air movement, no respiratory distress  Extremities: no cyanosis and no clubbing or edema   Musculoskeletal: no joint swelling, deformity or tenderness  Neurologic: gait, coordination normal and speech normal      Assessment:     Problem List Items Addressed This Visit       Back wound, right, subsequent encounter - Primary    Relevant Orders    Initiate Outpatient Wound Care Protocol    Neg. Pressure Wound Therapy    Nonhealing surgical wound, subsequent encounter    Relevant Orders    Initiate Outpatient Wound Care Protocol    Neg. Pressure Wound Therapy        Procedure Note  Indications:  Based on my examination of this patient's wound(s)/ulcer(s) today, debridement is required to promote healing and evaluate the wound base.    Performed by: SHREE WILLIS - CNP    Consent obtained:  Yes    Time out taken:  Yes    Pain Control: Anesthetic  Anesthetic: 2% Lidocaine Gel Topical     Debridement:Excisional Debridement    Using curette the wound(s)/ulcer(s) was/were sharply debrided down through and including the removal of subcutaneous tissue.        Devitalized Tissue Debrided:  fibrin, biofilm, and slough    Pre Debridement Measurements:  Are located in the Wound/Ulcer Documentation Flow Sheet    Wound/Ulcer #: 1    Post Debridement Measurements:  Wound/Ulcer Descriptions are Pre Debridement except measurements:    Wound 01/10/25 Back Right;Lower #1 (Active)   Wound Image   01/10/25 0930   Wound Etiology Non-Healing Surgical 01/17/25 1119   Dressing Status New drainage noted;Old drainage noted 01/17/25 1119   Wound Cleansed Soap and water 01/17/25 1119   Wound Length (cm) 0.7 cm 01/17/25 1119   Wound Width (cm) 3.7 cm 01/17/25 1119   Wound Depth (cm) 1.3 cm 01/17/25 1119   Wound Surface Area (cm^2) 2.59 cm^2 01/17/25 1119   Change in Wound Size % (l*w) 10.07 01/17/25 1119   Wound Volume (cm^3) 3.367 cm^3 01/17/25 1119   Wound Healing % 42

## 2025-01-19 LAB
MICROORGANISM SPEC CULT: NORMAL
MICROORGANISM SPEC CULT: NORMAL
MICROORGANISM/AGENT SPEC: NORMAL
MICROORGANISM/AGENT SPEC: NORMAL
SERVICE CMNT-IMP: NORMAL
SPECIMEN DESCRIPTION: NORMAL

## 2025-01-23 NOTE — DISCHARGE INSTRUCTIONS
[facility] -Phone: [phone] Fax: [phone]    Visit  Discharge Instructions / Physician Orders     DATE: 1/24/2025     Home Care: [facility]     SUPPLIES ORDERED THRU: [employer]     Wound Location: Right Lower Back     Cleanse with: Liquid antibacterial soap and water, rinse well      Dressing Orders: Skin Prep and Drape around wound, Black Foam into wound- Bridge to hip (ensure no sponge is in contact with intact skin), Continuous Suction at 125mmHg          If an issue with wound vac, remove and place saline moistened gauze, and cover with dry dressing     Frequency: Change Vac on Monday, Wednesday, Friday (Wound Care Center will change on appointment days)     Additional Orders: Increase protein to diet (meat, cheese, eggs, fish, peanut butter, nuts and beans)  Culture Taken 1/17/25     Your next appointment with Wound Care Center is in 1 week     (Please note your next appointment above and if you are unable to keep, kindly give a 24 hour notice. Thank you.)  If more than 15 min late we cannot guarantee you will be seen due to clinician schedule  Per Policy, Excessive cancellation will call for dismissal from program.     If you experience any of the following, please call the Wound Care Center during business hours:  [phone]     * Increase in Pain  * Temperature over 101  * Increase in drainage from your wound  * Drainage with a foul odor  * Bleeding  * Increase in swelling  * Need for compression bandage changes due to slippage, breakthrough drainage.     If you need medical attention outside of the business hours of the Wound Care Centers please contact your PCP or go to the an urgent care or emergency department     The information contained in the After Visit Summary has been reviewed with me, the patient and/or responsible adult, by my health care provider(s). I had the opportunity to ask questions regarding this information. I have elected to receive;

## 2025-01-24 ENCOUNTER — HOSPITAL ENCOUNTER (OUTPATIENT)
Dept: WOUND CARE | Age: 45
Discharge: HOME | End: 2025-01-24
Payer: COMMERCIAL

## 2025-01-24 VITALS
RESPIRATION RATE: 18 BRPM | TEMPERATURE: 96.5 F | BODY MASS INDEX: 26.06 KG/M2 | WEIGHT: 166 LBS | HEIGHT: 67 IN | SYSTOLIC BLOOD PRESSURE: 118 MMHG | DIASTOLIC BLOOD PRESSURE: 52 MMHG | HEART RATE: 80 BPM

## 2025-01-24 DIAGNOSIS — S21.201D BACK WOUND, RIGHT, SUBSEQUENT ENCOUNTER: Primary | ICD-10-CM

## 2025-01-24 DIAGNOSIS — T81.89XD NONHEALING SURGICAL WOUND, SUBSEQUENT ENCOUNTER: ICD-10-CM

## 2025-01-24 PROCEDURE — 97605 NEG PRS WND THER DME<=50SQCM: CPT

## 2025-01-24 PROCEDURE — 11042 DBRDMT SUBQ TIS 1ST 20SQCM/<: CPT

## 2025-01-24 RX ORDER — LIDOCAINE HYDROCHLORIDE 20 MG/ML
JELLY TOPICAL ONCE
OUTPATIENT
Start: 2025-01-24 | End: 2025-01-24

## 2025-01-24 RX ORDER — LIDOCAINE HYDROCHLORIDE 20 MG/ML
JELLY TOPICAL ONCE
Status: COMPLETED | OUTPATIENT
Start: 2025-01-24 | End: 2025-01-24

## 2025-01-24 RX ORDER — LIDOCAINE HYDROCHLORIDE 40 MG/ML
SOLUTION TOPICAL ONCE
OUTPATIENT
Start: 2025-01-24 | End: 2025-01-24

## 2025-01-24 RX ADMIN — LIDOCAINE HYDROCHLORIDE 6 ML: 20 JELLY TOPICAL at 09:06

## 2025-01-24 ASSESSMENT — ENCOUNTER SYMPTOMS
NAUSEA: 0
COUGH: 0
DIARRHEA: 0
SHORTNESS OF BREATH: 0
VOMITING: 0
RHINORRHEA: 0

## 2025-01-24 ASSESSMENT — PAIN SCALES - GENERAL
PAINLEVEL_OUTOF10: 0
PAINLEVEL_OUTOF10: 0

## 2025-01-24 NOTE — PROGRESS NOTES
Negative Pressure    NAME:  Shena Pacheco  YOB: 1980  MEDICAL RECORD NUMBER:  [id number]  DATE:  1/24/2025    Applied Negative Pressure to Right Buttocks wound(s)/ulcer(s).  [x] Applied skin barrier prep to luis daniel-wound.   [x] Cut strips of plastic drape to picture frame wound so that luis daniel-wound is     covered with the drape.   [x] If bridging dressing to less prominent site, cover any intact skin that will come in contact with the Negative Pressure Therapy sponge, gauze or channel drain with plastic drape.  The sponge should never touch intact skin.   [x] Cut sponge, gauze or channel drain to size which will fit into the wound/ulcer bed without being forced.   [x] Be sure the sponge is large enough to hold the entire round plastic flange which is attached to the tubing.  Never allow flange to be larger than the sponge or it will produce suction damaging intact skin.  Total number of individual pieces of foam used within the wound bed: 1    [x] If bridging the dressing away from the primary site, be sure the bridge leads to a piece of sponge large enough to hold the entire flange without allowing any of the flange to overlap onto intact skin.   [x] Covered sponge, gauze or channel drain with plastic drape.   [x] Cut a hole in this plastic drape directly over the sponge the same size as the plastic drain tubing.   [x] Removed plastic liner from flange and apply it directly over the hole you cut.   [x] Removed the plastic cover from the flange.   [x] Attached the tubing to the wound/ulcer Negative Pressure Therapy and turn it on to be sure a vacuum is created and that there are no leaks.   [x] If air leaks occur, use plastic drape to patch them.   [x] Secured Negative Pressure Therapy dressing with ace wrap loosely if located on an extremity. Maintain tubing outside of ace wrap. Tubing must not exert pressure on intact skin.    Applied per  Guidelines      Electronically signed by Jing YE 
atraumatic  Eyes: pupils equal, round and conjunctivae normal  Pulmonary/Chest: normal air movement, no respiratory distress  Extremities: no cyanosis and no clubbing or edema   Musculoskeletal: no joint swelling, deformity or tenderness  Neurologic: gait, coordination normal and speech normal      Assessment:     Problem List Items Addressed This Visit       Back wound, right, subsequent encounter - Primary    Relevant Orders    Initiate Outpatient Wound Care Protocol    Nonhealing surgical wound, subsequent encounter    Relevant Orders    Initiate Outpatient Wound Care Protocol        Procedure Note  Indications:  Based on my examination of this patient's wound(s)/ulcer(s) today, debridement is required to promote healing and evaluate the wound base.    Performed by: SHREE WILLIS - CNP    Consent obtained:  Yes    Time out taken:  Yes    Pain Control: Anesthetic  Anesthetic: 2% Lidocaine Gel Topical     Debridement:Excisional Debridement    Using curette the wound(s)/ulcer(s) was/were sharply debrided down through and including the removal of subcutaneous tissue.        Devitalized Tissue Debrided:  biofilm and slough    Pre Debridement Measurements:  Are located in the Wound/Ulcer Documentation Flow Sheet    Wound/Ulcer #: 1    Post Debridement Measurements:  Wound/Ulcer Descriptions are Pre Debridement except measurements:    Wound 01/10/25 Back Right;Lower #1 (Active)   Wound Image   01/10/25 0930   Wound Etiology Non-Healing Surgical 01/24/25 0910   Dressing Status New drainage noted;Old drainage noted 01/24/25 0910   Wound Cleansed Soap and water 01/24/25 0910   Wound Length (cm) 0.5 cm 01/24/25 0910   Wound Width (cm) 2.2 cm 01/24/25 0910   Wound Depth (cm) 0.6 cm 01/24/25 0910   Wound Surface Area (cm^2) 1.1 cm^2 01/24/25 0910   Change in Wound Size % (l*w) 61.81 01/24/25 0910   Wound Volume (cm^3) 0.66 cm^3 01/24/25 0910   Wound Healing % 89 01/24/25 0910   Post-Procedure Length (cm) 0.5 cm

## 2025-01-30 NOTE — DISCHARGE INSTRUCTIONS
[facility] -Phone: [phone] Fax: [phone]    Visit  Discharge Instructions / Physician Orders     DATE: 1/31/2025     Home Care: [facility]     SUPPLIES ORDERED THRU: [employer]- NPWT                                                    Supplies- [employer]     Wound Location: Right Lower Back     Cleanse with: Liquid antibacterial soap and water, rinse well      Dressing Orders: Collagen with Silver into wound- moisten with saline, Silicone Border Bandage    Frequency: Change every other day     Additional Orders: Increase protein to diet (meat, cheese, eggs, fish, peanut butter, nuts and beans)  Culture Taken 1/17/25  Vac Can be sent back 1/31/25     Your next appointment with Wound Care Center is in 1 week     (Please note your next appointment above and if you are unable to keep, kindly give a 24 hour notice. Thank you.)  If more than 15 min late we cannot guarantee you will be seen due to clinician schedule  Per Policy, Excessive cancellation will call for dismissal from program.     If you experience any of the following, please call the Wound Care Center during business hours:  [phone]     * Increase in Pain  * Temperature over 101  * Increase in drainage from your wound  * Drainage with a foul odor  * Bleeding  * Increase in swelling  * Need for compression bandage changes due to slippage, breakthrough drainage.     If you need medical attention outside of the business hours of the Wound Care Centers please contact your PCP or go to the an urgent care or emergency department     The information contained in the After Visit Summary has been reviewed with me, the patient and/or responsible adult, by my health care provider(s). I had the opportunity to ask questions regarding this information. I have elected to receive;      []After Visit Summary  [x]Comprehensive Discharge Instruction        Patient signature______________________________________Date:________

## 2025-01-31 ENCOUNTER — HOSPITAL ENCOUNTER (OUTPATIENT)
Dept: WOUND CARE | Age: 45
Discharge: HOME | End: 2025-01-31
Payer: COMMERCIAL

## 2025-01-31 VITALS
DIASTOLIC BLOOD PRESSURE: 51 MMHG | TEMPERATURE: 97.1 F | RESPIRATION RATE: 19 BRPM | SYSTOLIC BLOOD PRESSURE: 99 MMHG | HEART RATE: 79 BPM

## 2025-01-31 DIAGNOSIS — S21.201D BACK WOUND, RIGHT, SUBSEQUENT ENCOUNTER: Primary | ICD-10-CM

## 2025-01-31 DIAGNOSIS — T81.89XD NONHEALING SURGICAL WOUND, SUBSEQUENT ENCOUNTER: ICD-10-CM

## 2025-01-31 PROCEDURE — 11042 DBRDMT SUBQ TIS 1ST 20SQCM/<: CPT

## 2025-01-31 RX ORDER — TROSPIUM CHLORIDE 20 MG/1
20 TABLET, FILM COATED ORAL 2 TIMES DAILY
COMMUNITY
Start: 2025-01-27

## 2025-01-31 RX ORDER — LIDOCAINE HYDROCHLORIDE 20 MG/ML
JELLY TOPICAL ONCE
Status: COMPLETED | OUTPATIENT
Start: 2025-01-31 | End: 2025-01-31

## 2025-01-31 RX ORDER — LIDOCAINE HYDROCHLORIDE 40 MG/ML
SOLUTION TOPICAL ONCE
OUTPATIENT
Start: 2025-01-31 | End: 2025-01-31

## 2025-01-31 RX ORDER — LIDOCAINE HYDROCHLORIDE 20 MG/ML
JELLY TOPICAL ONCE
OUTPATIENT
Start: 2025-01-31 | End: 2025-01-31

## 2025-01-31 RX ADMIN — LIDOCAINE HYDROCHLORIDE 6 ML: 20 JELLY TOPICAL at 10:35

## 2025-01-31 ASSESSMENT — ENCOUNTER SYMPTOMS
SHORTNESS OF BREATH: 0
NAUSEA: 0
DIARRHEA: 0
COUGH: 0
VOMITING: 0
RHINORRHEA: 0

## 2025-01-31 NOTE — PROGRESS NOTES
[facility]   Progress Note and Procedure Note      Shena Pacheco  MEDICAL RECORD NUMBER:  [id number]  AGE: 44 y.o.   GENDER: female  : 1980  EPISODE DATE:  2025    Subjective:     Chief Complaint   Patient presents with    Wound Check     Right lower back         HISTORY of PRESENT ILLNESS HPI     Shena Pacheco is a 44 y.o. female who presents today for wound/ulcer evaluation.   History of Wound Context: here to follow up on right lower back wound after bladder stimulator was removed 2024. Has had wound vac but no longer needs. Home care can see one more time and discharge as she can do daily dressing herself. Continue to stay off work until wound is healed.   Wound/Ulcer Pain Timing/Severity: none  Quality of pain: N/A  Severity:  0 / 10   Modifying Factors: None  Associated Signs/Symptoms: none    Ulcer Identification:  Ulcer Type: non-healing surgical  Contributing Factors: none         PAST MEDICAL HISTORY        Diagnosis Date    ADD (attention deficit disorder)     Anxiety     Cancer (HCC)     COVID-19 vaccine series completed     no boosters    Dental anomaly     permanent retainer in place    Depression     Episode of syncope 2024    History of cervical cancer 1998    History of colon polyps     x 1 precancerous polyp    History of gastroesophageal reflux (GERD)     \"mild\"  no medications    Hx of blood clots     right leg superficial blood clot , did not require treatment , tested d/t leg swelling    Lumbosacral disc disease     with chronic pain    Neuroendocrine neoplasm of duodenum     OAB (overactive bladder) 2017    Obesity (BMI 30-39.9)     prior    JUICE (obstructive sleep apnea)     tried cpap but did not tolerate    Osteoarthritis     Polyarthralgia     Primary ovarian insufficiency     PUD (peptic ulcer disease)     Under care of team     Urology , Dr. Haas , last seen 2024    Under care of team

## 2025-01-31 NOTE — PROGRESS NOTES
Wound Care Supplies      Supply Company:     [employer]  Phone: [phone]  Fax: [phone]    Ordering Center:     [facility] WOUND [place] [id number] [facility] [id number] [phone]  WOUND CARE Dept: [phone]   FAX NUMBER [phone]    Patient Information:      Shena Rosado  [facility] [address]   [phone]   : 1980  AGE: 44 y.o.     GENDER: female   TODAYS DATE:  2025    Insurance:      PRIMARY INSURANCE:  Plan: [employer]  Coverage: [employer]  Effective Date: 2022  [id number] - (Commercial)  Group: [id number]     Patient Wound Information:      Diagnoses     Codes Comments   Back wound, right, subsequent encounter  - Primary S21.201D    Nonhealing surgical wound, subsequent encounter T81.89XD        WOUNDS REQUIRING DRESSING SUPPLIES:     Wound 01/10/25 Back Right;Lower #1 (Active)   Wound Image   01/10/25 0930   Wound Etiology Non-Healing Surgical 25 1032   Dressing Status New drainage noted;Old drainage noted 25 1032   Wound Cleansed Cleansed with saline 25 1032   Wound Length (cm) 0.3 cm 25 1032   Wound Width (cm) 1.8 cm 25 1032   Wound Depth (cm) 0.4 cm 25 1032   Wound Surface Area (cm^2) 0.54 cm^2 25 1032   Change in Wound Size % (l*w) 81.25 25 1032   Wound Volume (cm^3) 0.216 cm^3 25 1032   Wound Healing % 96 25 1032   Post-Procedure Length (cm) 0.3 cm 25 1032   Post-Procedure Width (cm) 1.8 cm 25 1032   Post-Procedure Depth (cm) 0.4 cm 25 1032   Post-Procedure Surface Area (cm^2) 0.54 cm^2 25 1032   Post-Procedure Volume (cm^3) 0.216 cm^3 25 1032   Wound Assessment Granulation tissue;Pink/red 25   Drainage Amount Moderate (25-50%) 25   Drainage Description Serosanguinous 25 103   Odor None 25   Meeta-wound Assessment Intact 25   Margins Defined edges 25   Wound Thickness

## 2025-02-04 ENCOUNTER — HOSPITAL ENCOUNTER (OUTPATIENT)
Dept: MAMMOGRAPHY | Age: 45
Discharge: HOME | End: 2025-02-06
Payer: COMMERCIAL

## 2025-02-04 VITALS — HEIGHT: 67 IN | BODY MASS INDEX: 26.06 KG/M2 | WEIGHT: 166 LBS

## 2025-02-04 DIAGNOSIS — Z12.31 ENCOUNTER FOR SCREENING MAMMOGRAM FOR BREAST CANCER: ICD-10-CM

## 2025-02-04 PROCEDURE — 77067 SCR MAMMO BI INCL CAD: CPT

## 2025-02-04 NOTE — DISCHARGE INSTRUCTIONS
[facility] -Phone: [phone] Fax: [phone]    Visit  Discharge Instructions / Physician Orders     DATE: 2/7/2025     Home Care: [facility]     SUPPLIES ORDERED THRU: [employer]- NPWT                                                    Supplies- [employer]     Wound Location: Right Lower Back     Cleanse with: Liquid antibacterial soap and water, rinse well      Dressing Orders: Silicone Border Bandage     Frequency: Change every other day     Additional Orders: Increase protein to diet (meat, cheese, eggs, fish, peanut butter, nuts and beans)  Culture Taken 1/17/25  Vac Can be sent back 1/31/25     Your next appointment with Wound Care Center is in 1 week     (Please note your next appointment above and if you are unable to keep, kindly give a 24 hour notice. Thank you.)  If more than 15 min late we cannot guarantee you will be seen due to clinician schedule  Per Policy, Excessive cancellation will call for dismissal from program.     If you experience any of the following, please call the Wound Care Center during business hours:  [phone]     * Increase in Pain  * Temperature over 101  * Increase in drainage from your wound  * Drainage with a foul odor  * Bleeding  * Increase in swelling  * Need for compression bandage changes due to slippage, breakthrough drainage.     If you need medical attention outside of the business hours of the Wound Care Centers please contact your PCP or go to the an urgent care or emergency department     The information contained in the After Visit Summary has been reviewed with me, the patient and/or responsible adult, by my health care provider(s). I had the opportunity to ask questions regarding this information. I have elected to receive;      []After Visit Summary  [x]Comprehensive Discharge Instruction        Patient signature______________________________________Date:________   Electronically signed by Anthony Mckeon RN on 2/7/2025 at

## 2025-02-07 ENCOUNTER — HOSPITAL ENCOUNTER (OUTPATIENT)
Dept: WOUND CARE | Age: 45
Discharge: HOME | End: 2025-02-07
Payer: COMMERCIAL

## 2025-02-07 VITALS — SYSTOLIC BLOOD PRESSURE: 99 MMHG | HEART RATE: 78 BPM | DIASTOLIC BLOOD PRESSURE: 48 MMHG | TEMPERATURE: 97.6 F

## 2025-02-07 DIAGNOSIS — S21.201D BACK WOUND, RIGHT, SUBSEQUENT ENCOUNTER: Primary | ICD-10-CM

## 2025-02-07 DIAGNOSIS — T81.89XD NONHEALING SURGICAL WOUND, SUBSEQUENT ENCOUNTER: ICD-10-CM

## 2025-02-07 PROCEDURE — 99212 OFFICE O/P EST SF 10 MIN: CPT

## 2025-02-07 PROCEDURE — 99213 OFFICE O/P EST LOW 20 MIN: CPT | Performed by: NURSE PRACTITIONER

## 2025-02-07 RX ORDER — LIDOCAINE HYDROCHLORIDE 40 MG/ML
SOLUTION TOPICAL ONCE
OUTPATIENT
Start: 2025-02-07 | End: 2025-02-07

## 2025-02-07 RX ORDER — LIDOCAINE HYDROCHLORIDE 20 MG/ML
JELLY TOPICAL ONCE
OUTPATIENT
Start: 2025-02-07 | End: 2025-02-07

## 2025-02-07 RX ORDER — LIDOCAINE HYDROCHLORIDE 20 MG/ML
JELLY TOPICAL ONCE
Status: COMPLETED | OUTPATIENT
Start: 2025-02-07 | End: 2025-02-07

## 2025-02-07 RX ORDER — ARIPIPRAZOLE 2 MG/1
2 TABLET ORAL DAILY
COMMUNITY

## 2025-02-07 RX ADMIN — LIDOCAINE HYDROCHLORIDE 6 ML: 20 JELLY TOPICAL at 09:53

## 2025-02-07 ASSESSMENT — ENCOUNTER SYMPTOMS
DIARRHEA: 0
SHORTNESS OF BREATH: 0
NAUSEA: 0
COUGH: 0
RHINORRHEA: 0
VOMITING: 0

## 2025-02-07 NOTE — PROGRESS NOTES
care of team     Neurology , Dr. Polanco , last seen 11/2024    Under care of team     Rheumatology , last seen 10/28/2024    Under care of team     Pulmonology , last seen 10/29/2024    Under care of team     Cardiology , Dr. Glez , [facility] , last seen 11/26/2024    Under care of team     Pain Management , Dr. Rodriguez , last seen 3/2024    Urinary urgency     stress inc , urge inc , recureent UTI's    Wears glasses     Wellness examination     PCP , Renee KELLEY , last seen 11/2024       PAST SURGICAL HISTORY    Past Surgical History:   Procedure Laterality Date    BACK SURGERY  09/19/2016    Lumbar interbody fusion posterior, L5-S1, had 5 surgeries total    BREAST ENHANCEMENT SURGERY  12/2020    COLONOSCOPY  05/18/2020    COSMETIC SURGERY  2019    breast enhancement    CYSTOSCOPY  11/17/2023    CYSTOSCOPY INJECTION BOTOX 200UNITS    CYSTOSCOPY N/A 11/17/2023    CYSTOSCOPY INJECTION BOTOX 200UNITS performed by Kyle Dorantes MD at [facility] OR    CYSTOSCOPY N/A 05/03/2024    CYSTOSCOPY INJECTION BOTOX 200 UNITS performed by Kyle Dorantes MD at [facility] OR    DEBRIDEMENT  12/31/2024    INTERSTIM REMOVAL AND WOUND DEBRIDEMENT    DILATION AND CURETTAGE OF UTERUS  2011    LEEP  1998    LUMBAR SPINE SURGERY  2020    foramenotomy    NERVE SURGERY N/A 12/31/2024    INTERSTIM REMOVAL AND WOUND DEBRIDEMENT performed by Kyle Dorantes MD at [facility] OR    OTHER SURGICAL HISTORY  03/26/2018    myelogram    OK ARTHRODESIS POSTERIOR INTERBODY 1 NTRSPC LUMBAR N/A 06/25/2018    LUMBAR INTERBODY FUSION POSTERIOR L45 performed by Matt Corrigan MD at [facility] OR    OK LAMINECTOMY W/O FFD > 2 VERT SEG LUMBAR N/A 04/23/2018    LUMBAR MICRO DISKECTOMY   L45 performed by Matt Corrigan MD at [facility] OR    SPINAL CORD STIMULATOR IMPLANT  2020    SPINAL CORD STIMULATOR SURGERY  2021    generator change    SPINAL CORD STIMULATOR SURGERY  2024    exchange to new spinal cord stimulator

## 2025-02-08 PROBLEM — R52 PAIN: Status: RESOLVED | Noted: 2024-05-28 | Resolved: 2025-02-08

## 2025-02-09 RX ORDER — SEMAGLUTIDE 1 MG/.5ML
INJECTION, SOLUTION SUBCUTANEOUS
Qty: 6 ML | Refills: 0 | Status: SHIPPED | OUTPATIENT
Start: 2025-02-09

## 2025-02-13 NOTE — DISCHARGE INSTRUCTIONS
[facility] -Phone: [phone] Fax: [phone]    Visit  Discharge Instructions / Physician Orders     DATE: 2/14/2025     Home Care: [facility]     SUPPLIES ORDERED THRU: [employer]- [facility]WT                                                    Supplies- [employer]     Wound Location: Right Lower Back     Cleanse with: As Normal     Dressing Orders: Silicone Border Bandage- while at work for protection     Frequency:      Additional Orders: Increase protein to diet (meat, cheese, eggs, fish, peanut butter, nuts and beans)  Culture Taken 1/17/25  Vac Can be sent back 1/31/25  Return to work with no restrictions- 2/17/25     Your next appointment with Wound Care Center is- call if needed     (Please note your next appointment above and if you are unable to keep, kindly give a 24 hour notice. Thank you.)  If more than 15 min late we cannot guarantee you will be seen due to clinician schedule  Per Policy, Excessive cancellation will call for dismissal from program.     If you experience any of the following, please call the Wound Care Center during business hours:  [phone]     * Increase in Pain  * Temperature over 101  * Increase in drainage from your wound  * Drainage with a foul odor  * Bleeding  * Increase in swelling  * Need for compression bandage changes due to slippage, breakthrough drainage.     If you need medical attention outside of the business hours of the Wound Care Centers please contact your PCP or go to the an urgent care or emergency department     The information contained in the After Visit Summary has been reviewed with me, the patient and/or responsible adult, by my health care provider(s). I had the opportunity to ask questions regarding this information. I have elected to receive;      []After Visit Summary  [x]Comprehensive Discharge Instruction        Patient signature______________________________________Date:________   Electronically signed by Anthony HENRIQUEZ

## 2025-02-14 ENCOUNTER — HOSPITAL ENCOUNTER (OUTPATIENT)
Dept: WOUND CARE | Age: 45
Discharge: HOME | End: 2025-02-14
Payer: COMMERCIAL

## 2025-02-14 DIAGNOSIS — T81.89XD NONHEALING SURGICAL WOUND, SUBSEQUENT ENCOUNTER: ICD-10-CM

## 2025-02-14 DIAGNOSIS — S21.201D BACK WOUND, RIGHT, SUBSEQUENT ENCOUNTER: Primary | ICD-10-CM

## 2025-02-14 PROCEDURE — 99211 OFF/OP EST MAY X REQ PHY/QHP: CPT

## 2025-02-14 PROCEDURE — 99212 OFFICE O/P EST SF 10 MIN: CPT | Performed by: NURSE PRACTITIONER

## 2025-02-14 RX ORDER — LIDOCAINE HYDROCHLORIDE 40 MG/ML
SOLUTION TOPICAL ONCE
OUTPATIENT
Start: 2025-02-14 | End: 2025-02-14

## 2025-02-14 RX ORDER — LIDOCAINE HYDROCHLORIDE 20 MG/ML
JELLY TOPICAL ONCE
OUTPATIENT
Start: 2025-02-14 | End: 2025-02-14

## 2025-02-14 ASSESSMENT — ENCOUNTER SYMPTOMS
SHORTNESS OF BREATH: 0
DIARRHEA: 0
COUGH: 0
NAUSEA: 0
VOMITING: 0
RHINORRHEA: 0

## 2025-02-14 NOTE — PLAN OF CARE
Problem: Wound:  Goal: Will show signs of wound healing; wound closure and no evidence of infection  Description: Will show signs of wound healing; wound closure and no evidence of infection  Outcome: Completed     Problem: Falls - Risk of:  Goal: Will remain free from falls  Description: Will remain free from falls  Outcome: Completed

## 2025-02-14 NOTE — PROGRESS NOTES
[facility]   Progress Note and Procedure Note      Shena Pacheco  MEDICAL RECORD NUMBER:  [id number]  AGE: 44 y.o.   GENDER: female  : 1980+  EPISODE DATE:  2025    Subjective:     Chief Complaint   Patient presents with    Wound Check     Right upper buttocks          HISTORY of PRESENT ILLNESS HPI     Shena Pacheco is a 44 y.o. female who presents today for wound/ulcer evaluation.   History of Wound Context: here to follow up on right lower back wound that remained healed. She is released to return to work 2025 without any restrictions.   Wound/Ulcer Pain Timing/Severity: none  Quality of pain: N/A  Severity:  0 / 10   Modifying Factors: None  Associated Signs/Symptoms: none    Ulcer Identification:  Ulcer Type: non-healing surgical  Contributing Factors: none    Wound: N/A        PAST MEDICAL HISTORY        Diagnosis Date    ADD (attention deficit disorder)     Anxiety     Cancer (HCC)     COVID-19 vaccine series completed     no boosters    Dental anomaly     permanent retainer in place    Depression     Episode of syncope 2024    History of cervical cancer 1998    History of colon polyps     x 1 precancerous polyp    History of gastroesophageal reflux (GERD)     \"mild\"  no medications    Hx of blood clots     right leg superficial blood clot , did not require treatment , tested d/t leg swelling    Lumbosacral disc disease     with chronic pain    Neuroendocrine neoplasm of duodenum     OAB (overactive bladder) 2017    Obesity (BMI 30-39.9)     prior    JUICE (obstructive sleep apnea)     tried cpap but did not tolerate    Osteoarthritis     Polyarthralgia     Primary ovarian insufficiency     PUD (peptic ulcer disease)     Under care of team     Urology , Dr. Haas , last seen 2024    Under care of team     Psychiatry , Dr Ibarra every 3 months , also see's counselor    Under care of team     Neurology , Dr. Polanco , last

## 2025-02-24 ENCOUNTER — HOSPITAL ENCOUNTER (OUTPATIENT)
Age: 45
Setting detail: SPECIMEN
Discharge: HOME | End: 2025-02-24

## 2025-02-24 LAB
BACTERIA URNS QL MICRO: ABNORMAL
BILIRUB UR QL STRIP: NEGATIVE
CASTS #/AREA URNS LPF: ABNORMAL /LPF (ref 0–2)
CASTS #/AREA URNS LPF: ABNORMAL /LPF (ref 0–2)
CLARITY UR: ABNORMAL
COLOR UR: ABNORMAL
EPI CELLS #/AREA URNS HPF: ABNORMAL /HPF (ref 0–5)
GLUCOSE UR STRIP-MCNC: NEGATIVE MG/DL
HGB UR QL STRIP.AUTO: ABNORMAL
KETONES UR STRIP-MCNC: NEGATIVE MG/DL
LEUKOCYTE ESTERASE UR QL STRIP: ABNORMAL
NITRITE UR QL STRIP: POSITIVE
PH UR STRIP: 6.5 [PH] (ref 5–8)
PROT UR STRIP-MCNC: ABNORMAL MG/DL
RBC #/AREA URNS HPF: ABNORMAL /HPF (ref 0–2)
SP GR UR STRIP: 1.02 (ref 1–1.03)
UROBILINOGEN UR STRIP-ACNC: NORMAL EU/DL (ref 0–1)
WBC #/AREA URNS HPF: ABNORMAL /HPF (ref 0–5)

## 2025-02-27 LAB
MICROORGANISM SPEC CULT: ABNORMAL
SPECIMEN DESCRIPTION: ABNORMAL

## 2025-02-27 RX ORDER — IBUPROFEN 800 MG/1
TABLET, FILM COATED ORAL
Qty: 30 TABLET | Refills: 0 | Status: SHIPPED | OUTPATIENT
Start: 2025-02-27

## 2025-02-28 RX ORDER — IBUPROFEN 800 MG/1
TABLET, FILM COATED ORAL
Qty: 30 TABLET | Refills: 0 | OUTPATIENT
Start: 2025-02-28

## 2025-04-02 ENCOUNTER — HOSPITAL ENCOUNTER (OUTPATIENT)
Age: 45
Setting detail: SPECIMEN
Discharge: HOME | End: 2025-04-02

## 2025-04-02 LAB
BILIRUB UR QL STRIP: NEGATIVE
CLARITY UR: CLEAR
COLOR UR: ABNORMAL
COMMENT: ABNORMAL
GLUCOSE UR STRIP-MCNC: NEGATIVE MG/DL
HGB UR QL STRIP.AUTO: NEGATIVE
KETONES UR STRIP-MCNC: NEGATIVE MG/DL
LEUKOCYTE ESTERASE UR QL STRIP: NEGATIVE
NITRITE UR QL STRIP: NEGATIVE
PH UR STRIP: 6 [PH] (ref 5–8)
PROT UR STRIP-MCNC: NEGATIVE MG/DL
SP GR UR STRIP: 1.02 (ref 1–1.03)
UROBILINOGEN UR STRIP-ACNC: NORMAL EU/DL (ref 0–1)

## 2025-04-03 LAB
MICROORGANISM SPEC CULT: NO GROWTH
SPECIMEN DESCRIPTION: NORMAL

## 2025-04-11 RX ORDER — ETONOGESTREL AND ETHINYL ESTRADIOL VAGINAL RING .015; .12 MG/D; MG/D
1 RING VAGINAL
Qty: 3 EACH | Refills: 3 | Status: SHIPPED | OUTPATIENT
Start: 2025-04-11

## 2025-05-22 RX ORDER — SEMAGLUTIDE 1 MG/.5ML
INJECTION, SOLUTION SUBCUTANEOUS
Qty: 6 ML | Refills: 0 | Status: SHIPPED | OUTPATIENT
Start: 2025-05-22

## 2025-05-27 RX ORDER — SEMAGLUTIDE 1 MG/.5ML
INJECTION, SOLUTION SUBCUTANEOUS
Qty: 6 ML | Refills: 0 | OUTPATIENT
Start: 2025-05-27

## 2025-07-27 SDOH — ECONOMIC STABILITY: FOOD INSECURITY: WITHIN THE PAST 12 MONTHS, YOU WORRIED THAT YOUR FOOD WOULD RUN OUT BEFORE YOU GOT MONEY TO BUY MORE.: NEVER TRUE

## 2025-07-27 SDOH — ECONOMIC STABILITY: FOOD INSECURITY: WITHIN THE PAST 12 MONTHS, THE FOOD YOU BOUGHT JUST DIDN'T LAST AND YOU DIDN'T HAVE MONEY TO GET MORE.: NEVER TRUE

## 2025-07-27 SDOH — ECONOMIC STABILITY: INCOME INSECURITY: IN THE LAST 12 MONTHS, WAS THERE A TIME WHEN YOU WERE NOT ABLE TO PAY THE MORTGAGE OR RENT ON TIME?: NO

## 2025-07-27 SDOH — ECONOMIC STABILITY: TRANSPORTATION INSECURITY
IN THE PAST 12 MONTHS, HAS THE LACK OF TRANSPORTATION KEPT YOU FROM MEDICAL APPOINTMENTS OR FROM GETTING MEDICATIONS?: NO

## 2025-07-27 SDOH — ECONOMIC STABILITY: TRANSPORTATION INSECURITY
IN THE PAST 12 MONTHS, HAS LACK OF TRANSPORTATION KEPT YOU FROM MEETINGS, WORK, OR FROM GETTING THINGS NEEDED FOR DAILY LIVING?: NO

## 2025-07-30 ENCOUNTER — OFFICE VISIT (OUTPATIENT)
Dept: FAMILY MEDICINE CLINIC | Age: 45
End: 2025-07-30
Payer: COMMERCIAL

## 2025-07-30 VITALS
DIASTOLIC BLOOD PRESSURE: 60 MMHG | SYSTOLIC BLOOD PRESSURE: 102 MMHG | HEIGHT: 67 IN | WEIGHT: 163 LBS | HEART RATE: 73 BPM | OXYGEN SATURATION: 98 % | BODY MASS INDEX: 25.58 KG/M2 | TEMPERATURE: 97.3 F

## 2025-07-30 DIAGNOSIS — R53.83 FATIGUE, UNSPECIFIED TYPE: ICD-10-CM

## 2025-07-30 DIAGNOSIS — G47.33 OSA (OBSTRUCTIVE SLEEP APNEA): ICD-10-CM

## 2025-07-30 DIAGNOSIS — Z11.59 NEED FOR HEPATITIS C SCREENING TEST: ICD-10-CM

## 2025-07-30 DIAGNOSIS — N91.2 AMENORRHEA: Primary | ICD-10-CM

## 2025-07-30 PROCEDURE — 99214 OFFICE O/P EST MOD 30 MIN: CPT | Performed by: PHYSICIAN ASSISTANT

## 2025-07-30 PROCEDURE — G8427 DOCREV CUR MEDS BY ELIG CLIN: HCPCS | Performed by: PHYSICIAN ASSISTANT

## 2025-07-30 PROCEDURE — G8419 CALC BMI OUT NRM PARAM NOF/U: HCPCS | Performed by: PHYSICIAN ASSISTANT

## 2025-07-30 PROCEDURE — 1036F TOBACCO NON-USER: CPT | Performed by: PHYSICIAN ASSISTANT

## 2025-07-30 RX ORDER — SERTRALINE HYDROCHLORIDE 25 MG/1
25 TABLET, FILM COATED ORAL DAILY
COMMUNITY
Start: 2025-07-23

## 2025-07-30 RX ORDER — DIAZEPAM 2 MG/1
2 TABLET ORAL DAILY PRN
COMMUNITY
Start: 2025-05-12

## 2025-07-30 RX ORDER — NITROFURANTOIN 25; 75 MG/1; MG/1
100 CAPSULE ORAL 2 TIMES DAILY
COMMUNITY
Start: 2025-07-28

## 2025-07-30 RX ORDER — MIRABEGRON 25 MG/1
25 TABLET, FILM COATED, EXTENDED RELEASE ORAL DAILY
COMMUNITY
Start: 2025-07-28

## 2025-07-30 ASSESSMENT — ENCOUNTER SYMPTOMS
COUGH: 0
COLOR CHANGE: 0
ABDOMINAL PAIN: 0
SHORTNESS OF BREATH: 0

## 2025-07-30 NOTE — PROGRESS NOTES
[facility]  [facility]  [address], [place] [address]  Dept: [phone]  Dept Fax: [phone]    Shena Pacheco is a 45 y.o. female who presents today for her medical conditions/complaintsas noted below.  Shena Pacheco is c/o of   Chief Complaint   Patient presents with    Fatigue     Labs done through  rheumatology everything came back good   B12 , thyroid   X months - it does not matter how much sleep she gets          HPI:     Fatigue  Associated symptoms include fatigue. Pertinent negatives include no abdominal pain, chest pain, coughing, fever, numbness or weakness.   Patient is here today to discuss on going fatigue over the last year. Patient reports she has seen her rheumatologist for this and had labs done recently. She reports labs returned in good range including vitamin levels. Patient was advised to follow up with her psychiatrist. Patient did try changing the time she dosed her zoloft and abilify to night time. She has tried changing things around and is now taking zoloft in the morning and abilify at night. She is sleeping through the night now. She states her energy is still not quite normal but is sleeping much better. She does feel her mood is better.    Patient states she tries to get around 7-8 hours of sleep nightly. Sleep is sometimes broken due to increased urination. Patient states she just saw her urologist who adjusted her bladder medication. She is hoping this will help with consistent sleep. Patient has been diagnosed with JUICE. She tried CPAP at home but did not tolerate it.     Patient states she did stop her birth control in June after it not staying in place. She is interested in lab testing. Planning to follow with her GYN, they have been discussing possible HRT soon.     Lab Results   Component Value Date    WBC 4.8 01/13/2025    HGB 13.5 01/13/2025    HCT 41.7 01/13/2025    MCV 90.3 01/13/2025     01/13/2025       Lab Results

## 2025-08-05 ENCOUNTER — HOSPITAL ENCOUNTER (OUTPATIENT)
Age: 45
Setting detail: SPECIMEN
Discharge: HOME | End: 2025-08-05

## 2025-08-05 DIAGNOSIS — R53.83 FATIGUE, UNSPECIFIED TYPE: ICD-10-CM

## 2025-08-05 DIAGNOSIS — Z11.59 NEED FOR HEPATITIS C SCREENING TEST: ICD-10-CM

## 2025-08-05 DIAGNOSIS — N91.2 AMENORRHEA: ICD-10-CM

## 2025-08-05 LAB
FSH SERPL-ACNC: 93.4 MIU/ML
HCV AB SERPL QL IA: NONREACTIVE

## 2025-08-10 LAB
ESTRADIOL LEVEL: 5.4 PG/ML
ESTROGEN TOTAL: 23.3 PG/ML
ESTRONE SERPL-MCNC: 17.9 PG/ML

## (undated) DEVICE — SOLUTION IRRIG 3000ML STRL H2O USP UROMATIC PLAS CONT

## (undated) DEVICE — SUTURE MONOCRYL + SZ 4-0 L27IN ABSRB UD L19MM PS-2 3/8 CIR MCP426H

## (undated) DEVICE — APPLICATOR MEDICATED 26 CC SOLUTION HI LT ORNG CHLORAPREP

## (undated) DEVICE — SYRINGE MED 50ML LUERLOCK TIP

## (undated) DEVICE — GLOVE ORANGE PI 7 1/2   MSG9075

## (undated) DEVICE — FIAPC® PROBE W/ FILTER 2200 A OD 2.3MM/6.9FR; L 2.2M/7.2FT: Brand: ERBE

## (undated) DEVICE — SUTURE VICRYL + SZ 3-0 L27IN ABSRB UD L26MM SH 1/2 CIR VCP416H

## (undated) DEVICE — 1010 S-DRAPE TOWEL DRAPE 10/BX: Brand: STERI-DRAPE™

## (undated) DEVICE — FORCEPS BX L240CM WRK CHN 2.8MM STD CAP W/ NDL MIC MESH

## (undated) DEVICE — GOWN,AURORA,NONRNF,XL,30/CS: Brand: MEDLINE

## (undated) DEVICE — SWABSTICK MEDICATED 10% POVIDONE IOD PVP SGL ANTISEP SAT

## (undated) DEVICE — GAUZE,PACKING STRIP,IODOFORM,1/2"X5YD,ST: Brand: CURAD

## (undated) DEVICE — MHPB CYSTO PACK-LF: Brand: MEDLINE INDUSTRIES, INC.

## (undated) DEVICE — STANDARD HYPODERMIC NEEDLE,ALUMINUM HUB: Brand: MONOJECT

## (undated) DEVICE — GLOVE ORANGE PI 8   MSG9080

## (undated) DEVICE — STANDARD HYPODERMIC NEEDLE,POLYPROPYLENE HUB: Brand: MONOJECT

## (undated) DEVICE — GLOVE SURG SZ 8 L12IN FNGR THK87MIL WHT LTX FREE

## (undated) DEVICE — SUTURE ETHLN SZ 3-0 L18IN NONABSORBABLE BLK L24MM PS-1 3/8 1663G

## (undated) DEVICE — SOLUTION SCRB 4OZ 4% CHG H2O AIDED FOR PREOPERATIVE SKIN

## (undated) DEVICE — CUSHION PRONEVIEW L HD NK FOAM

## (undated) DEVICE — CONMED ACCESSORY ELECTRODE, NEEDLE ELECTRODE: Brand: CONMED

## (undated) DEVICE — 6.0MM PRECISION ROUND

## (undated) DEVICE — ABSORBENT, WATERPROOF, BACTERIA PROOF FILM DRESSING: Brand: OPSITE POST OP 15.5X8.5CM CTN 20

## (undated) DEVICE — FORCEPS SURG 25GA FN TIP ECKARDT INT LIMITING MEMBRN PINN

## (undated) DEVICE — SUPER VIEW® STERILE LENS PACK FOR USE WITH THE SUPER VIEW® SYSTEM AND THE BIOM®: Brand: SUPER VIEW® DISPOSABLE LENS SET

## (undated) DEVICE — SYRINGE, LUER LOCK, 10ML: Brand: MEDLINE

## (undated) DEVICE — DRAPE,REIN 53X77,STERILE: Brand: MEDLINE

## (undated) DEVICE — HANDPIECE SET WITH COAXIAL HIGH FLOW TIP AND SUCTION TUBE: Brand: INTERPULSE

## (undated) DEVICE — GAUZE,SPONGE,FLUFF,6"X6.75",STRL,5/TRAY: Brand: MEDLINE

## (undated) DEVICE — SUTURE MONOCRYL SZ 4-0 L18IN ABSRB UD L16MM PC-3 3/8 CIR PRIM Y845G

## (undated) DEVICE — ELECTRODE PT RET AD L9FT HI MOIST COND ADH HYDRGEL CORDED

## (undated) DEVICE — 1 EACH 40411 STERILE DISPOSABLE SUPER VIEW® LENS SET & 1 EACH 40100 STERILE MICROSCOPE DRAPE: Brand: SUPER VIEW® PACK

## (undated) DEVICE — Device

## (undated) DEVICE — LIQUIBAND RAPID ADHESIVE 36/CS 0.8ML: Brand: MEDLINE

## (undated) DEVICE — SUTURE PROL SZ 3-0 L30IN NONABSORBABLE BLU L26MM SH 1/2 CIR 8832H

## (undated) DEVICE — GARMENT,MEDLINE,DVT,INT,CALF,MED, GEN2: Brand: MEDLINE

## (undated) DEVICE — COVER,TABLE,HEAVY DUTY,50"X90",STRL: Brand: MEDLINE

## (undated) DEVICE — 3M™ WARMING BLANKET, UPPER BODY, 10 PER CASE, 42268: Brand: BAIR HUGGER™

## (undated) DEVICE — DRAPE C ARM UNIV W41XL74IN CLR PLAS XR VELC CLSR POLY STRP

## (undated) DEVICE — PROGRAMMER SMART COMM W/HANDSET F/SACRAL NRVE STIMULATORS

## (undated) DEVICE — 25+* ULTRAVIT* VITRECTOMY PROBE BEVELED 25+* /10,000 CPM: Brand: CONSTELLATION, ULTRAVIT, ENGAUGE, 25+

## (undated) DEVICE — GLOVE SURG SZ 75 L12IN FNGR THK87MIL WHT LTX FREE

## (undated) DEVICE — STIMULATOR NERVE 4.32 MM PERC EXTN KT INTERSTIM QUAD

## (undated) DEVICE — SOLUTION IRRIGATION BAL SALT SOLUTION 500 ML BTL 6/CA BSS +

## (undated) DEVICE — SPONGE DRN W4XL4IN RAYON/POLYESTER 6 PLY NONWOVEN PRECUT

## (undated) DEVICE — TAPE ADH W1INXL10YD PLAS TRNSPAR H2O RESIST HYPOALRG CURAD

## (undated) DEVICE — CONTAINER,SPECIMEN,4OZ,OR STRL: Brand: MEDLINE

## (undated) DEVICE — 1000 S-DRAPE TOWEL DRAPE 10/BX: Brand: STERI-DRAPE™

## (undated) DEVICE — NEUROSTIMULATOR EXT SM LTWT SGL BTTN H2O RESIST WIRELESS

## (undated) DEVICE — BLUNTFILL: Brand: MONOJECT

## (undated) DEVICE — NEEDLE HYPO 25GA L1.5IN BLU POLYPR HUB S STL REG BVL STR

## (undated) DEVICE — SYRINGE MED 10ML LUERLOCK TIP W/O SFTY DISP

## (undated) DEVICE — MASTISOL ADHESIVE LIQ 2/3ML

## (undated) DEVICE — SYRINGE ST FILTERS W/MCE MEMBRAN

## (undated) DEVICE — DRAIN SURG W10XL20CM SIL SMOOTH FLAT 3/4 PERF DBL WRP

## (undated) DEVICE — STRAP ARMBRD W1.5XL32IN FOAM STR YET SFT W/ HK AND LOOP

## (undated) DEVICE — SOLUTION IRRIG 1000ML STRL H2O USP PLAS POUR BTL

## (undated) DEVICE — DRESSING TRNSPAR W4XL4.8IN W/ N ADH PD SURESITE-123 PLUSPD

## (undated) DEVICE — DRAPE MICSCP W117XL305CM DIA65MM LENS W VARI LENS2 FOR LEICA

## (undated) DEVICE — DRESSING TRNSPAR W8XL12IN FLM SURESITE 123

## (undated) DEVICE — GAUZE,SPONGE,4"X4",16PLY,STRL,LF,10/TRAY: Brand: MEDLINE

## (undated) DEVICE — RESERVOIR,SUCTION,100CC,SILICONE: Brand: MEDLINE

## (undated) DEVICE — MHPB GEN MINOR PACK: Brand: MEDLINE INDUSTRIES, INC.

## (undated) DEVICE — STRAP,POSITIONING,KNEE/BODY,FOAM,4X60": Brand: MEDLINE

## (undated) DEVICE — SOLUTION IV IRRIG POUR BRL 0.9% SODIUM CHL 2F7124

## (undated) DEVICE — SWAB SPEC COLLCTN LIQ AMIES 2 PK BACTISWAB

## (undated) DEVICE — GAUZE,SPONGE,4"X4",16PLY,XRAY,STRL,LF: Brand: MEDLINE

## (undated) DEVICE — SUTURE PERMAHAND SZ 3-0 L30IN NONABSORBABLE BLK SH L26MM K832H

## (undated) DEVICE — TOWEL,OR,DSP,ST,NATURAL,DLX,4/PK,20PK/CS: Brand: MEDLINE

## (undated) DEVICE — RETINA PK

## (undated) DEVICE — OCCUCOAT SYRINGE 1ML 6PK: Brand: OCCUCOAT

## (undated) DEVICE — CORD,CAUTERY,BIPOLAR,STERILE: Brand: MEDLINE

## (undated) DEVICE — STRIP,CLOSURE,WOUND,MEDI-STRIP,1/2X4: Brand: MEDLINE

## (undated) DEVICE — SVMMC PEDS/UROLOGY MINOR PACK: Brand: MEDLINE INDUSTRIES, INC.

## (undated) DEVICE — SUTURE STRATAFIX SYMMETRIC PDS + SZ 0 L18IN ABSRB VLT CT SXPP1A406

## (undated) DEVICE — C-ARMOR C-ARM EQUIPMENT COVERS CLEAR STERILE UNIVERSAL FIT 12 PER CASE: Brand: C-ARMOR

## (undated) DEVICE — PAD,ABDOMINAL,5"X9",ST,LF,25/BX: Brand: MEDLINE INDUSTRIES, INC.

## (undated) DEVICE — DRAPE SURG W41XL74IN CLR FULL SZ C ARM 3 ADH POLY STRP E

## (undated) DEVICE — PACK PROCEDURE SURG CYSTO SVMMC LF

## (undated) DEVICE — KIT POS TBL W O HDRST JACK

## (undated) DEVICE — BLANKET WRM W29.9XL79.1IN UP BODY FORC AIR MISTRAL-AIR

## (undated) DEVICE — TUBING, SUCTION, 1/4" X 12', STRAIGHT: Brand: MEDLINE

## (undated) DEVICE — YANKAUER,FLEXIBLE HANDLE,REGLR CAPACITY: Brand: MEDLINE INDUSTRIES, INC.

## (undated) DEVICE — GOWN,SIRUS,NONRNF,SETINSLV,XL,20/CS: Brand: MEDLINE

## (undated) DEVICE — 25GA EZPASS SOFT TIP CANNULA BOX OF 5: Brand: VORTEX SURGICAL INC

## (undated) DEVICE — HYPODERMIC SAFETY NEEDLE: Brand: MAGELLAN

## (undated) DEVICE — GLOVE SURG SZ 7 L12IN FNGR THK87MIL WHT LTX FREE

## (undated) DEVICE — GLOVE SURG SZ 65 L12IN FNGR THK87MIL WHT LTX FREE

## (undated) DEVICE — PROTECTOR ULN NRV PUR FOAM HK LOOP STRP ANATOMICALLY

## (undated) DEVICE — INTRODUCER NEUROSTIMULATOR LD FOR URIN CTRL INTERSTIM

## (undated) DEVICE — PAD,NON-ADHERENT,3X8,STERILE,LF,1/PK: Brand: MEDLINE

## (undated) DEVICE — TOTAL TRAY, DB, 100% SILI FOLEY, 16FR 10: Brand: MEDLINE